# Patient Record
Sex: MALE | Race: WHITE | NOT HISPANIC OR LATINO | Employment: OTHER | URBAN - METROPOLITAN AREA
[De-identification: names, ages, dates, MRNs, and addresses within clinical notes are randomized per-mention and may not be internally consistent; named-entity substitution may affect disease eponyms.]

---

## 2017-04-12 ENCOUNTER — HOSPITAL ENCOUNTER (OUTPATIENT)
Dept: OTHER | Facility: HOSPITAL | Age: 77
Discharge: HOME | End: 2017-04-12
Attending: UROLOGY | Admitting: UROLOGY

## 2018-05-09 ENCOUNTER — ON CAMPUS - OUTPATIENT (AMBULATORY)
Dept: RURAL HOSPITAL 3 | Facility: HOSPITAL | Age: 78
End: 2018-05-09

## 2018-05-09 DIAGNOSIS — K22.4 DYSKINESIA OF ESOPHAGUS: ICD-10-CM

## 2018-05-09 DIAGNOSIS — R13.10 DYSPHAGIA, UNSPECIFIED: ICD-10-CM

## 2018-05-09 DIAGNOSIS — K22.5 DIVERTICULUM OF ESOPHAGUS, ACQUIRED: ICD-10-CM

## 2018-05-09 PROCEDURE — 43249 ESOPH EGD DILATION <30 MM: CPT | Performed by: INTERNAL MEDICINE

## 2020-01-01 ENCOUNTER — OFFICE (AMBULATORY)
Dept: URBAN - METROPOLITAN AREA CLINIC 64 | Facility: CLINIC | Age: 80
End: 2020-01-01

## 2020-01-01 VITALS — HEART RATE: 87 BPM | DIASTOLIC BLOOD PRESSURE: 54 MMHG | SYSTOLIC BLOOD PRESSURE: 96 MMHG

## 2020-01-01 DIAGNOSIS — R13.10 DYSPHAGIA, UNSPECIFIED: ICD-10-CM

## 2020-01-01 PROCEDURE — 99213 OFFICE O/P EST LOW 20 MIN: CPT | Performed by: NURSE PRACTITIONER

## 2021-01-01 ENCOUNTER — INPATIENT HOSPITAL (AMBULATORY)
Dept: URBAN - METROPOLITAN AREA HOSPITAL 84 | Facility: HOSPITAL | Age: 81
End: 2021-01-01

## 2021-01-01 DIAGNOSIS — K22.5 DIVERTICULUM OF ESOPHAGUS, ACQUIRED: ICD-10-CM

## 2021-01-01 DIAGNOSIS — R13.10 DYSPHAGIA, UNSPECIFIED: ICD-10-CM

## 2021-01-01 PROCEDURE — 43249 ESOPH EGD DILATION <30 MM: CPT | Performed by: INTERNAL MEDICINE

## 2021-03-16 ENCOUNTER — APPOINTMENT (OUTPATIENT)
Dept: WOUND CARE | Facility: HOSPITAL | Age: 81
End: 2021-03-16

## 2021-03-18 ENCOUNTER — OFFICE VISIT (OUTPATIENT)
Dept: WOUND CARE | Facility: HOSPITAL | Age: 81
End: 2021-03-18

## 2021-03-18 PROCEDURE — G0463 HOSPITAL OUTPT CLINIC VISIT: HCPCS

## 2021-03-25 ENCOUNTER — APPOINTMENT (OUTPATIENT)
Dept: WOUND CARE | Facility: HOSPITAL | Age: 81
End: 2021-03-25

## 2021-04-01 ENCOUNTER — HOSPITAL ENCOUNTER (INPATIENT)
Facility: HOSPITAL | Age: 81
LOS: 4 days | Discharge: HOME HEALTH | End: 2021-04-05
Attending: FAMILY MEDICINE | Admitting: FAMILY MEDICINE

## 2021-04-01 ENCOUNTER — ANESTHESIA EVENT (OUTPATIENT)
Dept: PERIOP | Facility: HOSPITAL | Age: 81
End: 2021-04-01

## 2021-04-01 ENCOUNTER — LAB REQUISITION (OUTPATIENT)
Dept: LAB | Facility: HOSPITAL | Age: 81
End: 2021-04-01

## 2021-04-01 ENCOUNTER — OFFICE VISIT (OUTPATIENT)
Dept: WOUND CARE | Facility: HOSPITAL | Age: 81
End: 2021-04-01

## 2021-04-01 ENCOUNTER — APPOINTMENT (OUTPATIENT)
Dept: GENERAL RADIOLOGY | Facility: HOSPITAL | Age: 81
End: 2021-04-01

## 2021-04-01 DIAGNOSIS — R13.10 DYSPHAGIA, UNSPECIFIED TYPE: ICD-10-CM

## 2021-04-01 DIAGNOSIS — L89.150 PRESSURE ULCER OF SACRAL REGION, UNSTAGEABLE (HCC): ICD-10-CM

## 2021-04-01 DIAGNOSIS — L89.150 PRESSURE INJURY OF SACRAL REGION, UNSTAGEABLE (HCC): Primary | ICD-10-CM

## 2021-04-01 PROBLEM — I10 BENIGN ESSENTIAL HYPERTENSION: Chronic | Status: ACTIVE | Noted: 2021-04-01

## 2021-04-01 PROBLEM — L89.159 SACRAL PRESSURE ULCER: Status: ACTIVE | Noted: 2021-04-01

## 2021-04-01 PROBLEM — K21.9 GERD WITHOUT ESOPHAGITIS: Chronic | Status: ACTIVE | Noted: 2021-04-01

## 2021-04-01 PROBLEM — R15.9 BOWEL INCONTINENCE: Chronic | Status: ACTIVE | Noted: 2021-04-01

## 2021-04-01 PROBLEM — G35 MULTIPLE SCLEROSIS (HCC): Chronic | Status: ACTIVE | Noted: 2021-04-01

## 2021-04-01 PROBLEM — G35 MULTIPLE SCLEROSIS (HCC): Status: ACTIVE | Noted: 2021-04-01

## 2021-04-01 PROBLEM — E78.5 HYPERLIPIDEMIA: Status: ACTIVE | Noted: 2021-04-01

## 2021-04-01 PROBLEM — I10 BENIGN ESSENTIAL HYPERTENSION: Status: ACTIVE | Noted: 2021-04-01

## 2021-04-01 PROBLEM — I63.9 CEREBROVASCULAR ACCIDENT (CVA) (HCC): Chronic | Status: ACTIVE | Noted: 2021-04-01

## 2021-04-01 PROBLEM — E11.9 TYPE 2 DIABETES MELLITUS (HCC): Status: ACTIVE | Noted: 2021-04-01

## 2021-04-01 PROBLEM — E78.5 HYPERLIPIDEMIA: Chronic | Status: ACTIVE | Noted: 2021-04-01

## 2021-04-01 PROBLEM — E44.1 MILD MALNUTRITION (HCC): Chronic | Status: ACTIVE | Noted: 2021-04-01

## 2021-04-01 PROBLEM — N31.9 NEUROGENIC BLADDER: Chronic | Status: ACTIVE | Noted: 2021-04-01

## 2021-04-01 PROBLEM — Z86.73 HISTORY OF CVA (CEREBROVASCULAR ACCIDENT): Chronic | Status: ACTIVE | Noted: 2021-04-01

## 2021-04-01 PROBLEM — E11.9 TYPE 2 DIABETES MELLITUS (HCC): Chronic | Status: ACTIVE | Noted: 2021-04-01

## 2021-04-01 PROBLEM — I63.9 CEREBROVASCULAR ACCIDENT (CVA) (HCC): Status: ACTIVE | Noted: 2021-04-01

## 2021-04-01 LAB
ALBUMIN SERPL-MCNC: 3.6 G/DL (ref 3.5–5.2)
ALBUMIN/GLOB SERPL: 0.7 G/DL
ALP SERPL-CCNC: 69 U/L (ref 39–117)
ALT SERPL W P-5'-P-CCNC: 30 U/L (ref 1–41)
ANION GAP SERPL CALCULATED.3IONS-SCNC: 12 MMOL/L (ref 5–15)
AST SERPL-CCNC: 21 U/L (ref 1–40)
BASOPHILS # BLD AUTO: 0.1 10*3/MM3 (ref 0–0.2)
BASOPHILS NFR BLD AUTO: 0.8 % (ref 0–1.5)
BILIRUB SERPL-MCNC: 0.3 MG/DL (ref 0–1.2)
BUN SERPL-MCNC: 26 MG/DL (ref 8–23)
BUN/CREAT SERPL: 27.1 (ref 7–25)
CALCIUM SPEC-SCNC: 9 MG/DL (ref 8.6–10.5)
CHLORIDE SERPL-SCNC: 104 MMOL/L (ref 98–107)
CO2 SERPL-SCNC: 26 MMOL/L (ref 22–29)
CREAT SERPL-MCNC: 0.96 MG/DL (ref 0.76–1.27)
CRP SERPL-MCNC: 19.39 MG/DL (ref 0–0.5)
D-LACTATE SERPL-SCNC: 1.6 MMOL/L (ref 0.5–2)
DEPRECATED RDW RBC AUTO: 43.3 FL (ref 37–54)
EOSINOPHIL # BLD AUTO: 0.1 10*3/MM3 (ref 0–0.4)
EOSINOPHIL NFR BLD AUTO: 0.4 % (ref 0.3–6.2)
ERYTHROCYTE [DISTWIDTH] IN BLOOD BY AUTOMATED COUNT: 16.2 % (ref 12.3–15.4)
ERYTHROCYTE [SEDIMENTATION RATE] IN BLOOD: >130 MM/HR (ref 0–20)
GFR SERPL CREATININE-BSD FRML MDRD: 76 ML/MIN/1.73
GLOBULIN UR ELPH-MCNC: 5.1 GM/DL
GLUCOSE SERPL-MCNC: 145 MG/DL (ref 65–99)
HCT VFR BLD AUTO: 43.9 % (ref 37.5–51)
HGB BLD-MCNC: 13.9 G/DL (ref 13–17.7)
LYMPHOCYTES # BLD AUTO: 1 10*3/MM3 (ref 0.7–3.1)
LYMPHOCYTES NFR BLD AUTO: 8.1 % (ref 19.6–45.3)
MCH RBC QN AUTO: 24.6 PG (ref 26.6–33)
MCHC RBC AUTO-ENTMCNC: 31.7 G/DL (ref 31.5–35.7)
MCV RBC AUTO: 77.7 FL (ref 79–97)
MONOCYTES # BLD AUTO: 1.1 10*3/MM3 (ref 0.1–0.9)
MONOCYTES NFR BLD AUTO: 9.1 % (ref 5–12)
NEUTROPHILS NFR BLD AUTO: 10.3 10*3/MM3 (ref 1.7–7)
NEUTROPHILS NFR BLD AUTO: 81.6 % (ref 42.7–76)
NRBC BLD AUTO-RTO: 0.1 /100 WBC (ref 0–0.2)
PLATELET # BLD AUTO: 508 10*3/MM3 (ref 140–450)
PMV BLD AUTO: 7.3 FL (ref 6–12)
POTASSIUM SERPL-SCNC: 4.4 MMOL/L (ref 3.5–5.2)
PROT SERPL-MCNC: 8.7 G/DL (ref 6–8.5)
RBC # BLD AUTO: 5.65 10*6/MM3 (ref 4.14–5.8)
SARS-COV-2 RNA PNL SPEC NAA+PROBE: NOT DETECTED
SODIUM SERPL-SCNC: 142 MMOL/L (ref 136–145)
WBC # BLD AUTO: 12.6 10*3/MM3 (ref 3.4–10.8)

## 2021-04-01 PROCEDURE — 71045 X-RAY EXAM CHEST 1 VIEW: CPT

## 2021-04-01 PROCEDURE — 87070 CULTURE OTHR SPECIMN AEROBIC: CPT | Performed by: SURGERY

## 2021-04-01 PROCEDURE — 25010000002 CEFEPIME PER 500 MG: Performed by: NURSE PRACTITIONER

## 2021-04-01 PROCEDURE — 87150 DNA/RNA AMPLIFIED PROBE: CPT | Performed by: NURSE PRACTITIONER

## 2021-04-01 PROCEDURE — 85652 RBC SED RATE AUTOMATED: CPT | Performed by: NURSE PRACTITIONER

## 2021-04-01 PROCEDURE — G0463 HOSPITAL OUTPT CLINIC VISIT: HCPCS

## 2021-04-01 PROCEDURE — U0003 INFECTIOUS AGENT DETECTION BY NUCLEIC ACID (DNA OR RNA); SEVERE ACUTE RESPIRATORY SYNDROME CORONAVIRUS 2 (SARS-COV-2) (CORONAVIRUS DISEASE [COVID-19]), AMPLIFIED PROBE TECHNIQUE, MAKING USE OF HIGH THROUGHPUT TECHNOLOGIES AS DESCRIBED BY CMS-2020-01-R: HCPCS | Performed by: SURGERY

## 2021-04-01 PROCEDURE — 80053 COMPREHEN METABOLIC PANEL: CPT | Performed by: NURSE PRACTITIONER

## 2021-04-01 PROCEDURE — 87147 CULTURE TYPE IMMUNOLOGIC: CPT | Performed by: NURSE PRACTITIONER

## 2021-04-01 PROCEDURE — 87147 CULTURE TYPE IMMUNOLOGIC: CPT | Performed by: SURGERY

## 2021-04-01 PROCEDURE — 83605 ASSAY OF LACTIC ACID: CPT | Performed by: NURSE PRACTITIONER

## 2021-04-01 PROCEDURE — 87641 MR-STAPH DNA AMP PROBE: CPT | Performed by: SURGERY

## 2021-04-01 PROCEDURE — 25010000002 VANCOMYCIN 10 G RECONSTITUTED SOLUTION: Performed by: NURSE PRACTITIONER

## 2021-04-01 PROCEDURE — 87040 BLOOD CULTURE FOR BACTERIA: CPT | Performed by: NURSE PRACTITIONER

## 2021-04-01 PROCEDURE — 86140 C-REACTIVE PROTEIN: CPT | Performed by: NURSE PRACTITIONER

## 2021-04-01 PROCEDURE — 87186 SC STD MICRODIL/AGAR DIL: CPT | Performed by: SURGERY

## 2021-04-01 PROCEDURE — 87077 CULTURE AEROBIC IDENTIFY: CPT | Performed by: SURGERY

## 2021-04-01 PROCEDURE — 25010000002 ENOXAPARIN PER 10 MG: Performed by: NURSE PRACTITIONER

## 2021-04-01 PROCEDURE — U0005 INFEC AGEN DETEC AMPLI PROBE: HCPCS | Performed by: SURGERY

## 2021-04-01 PROCEDURE — 85025 COMPLETE CBC W/AUTO DIFF WBC: CPT | Performed by: NURSE PRACTITIONER

## 2021-04-01 PROCEDURE — 99222 1ST HOSP IP/OBS MODERATE 55: CPT | Performed by: SURGERY

## 2021-04-01 PROCEDURE — 87205 SMEAR GRAM STAIN: CPT | Performed by: SURGERY

## 2021-04-01 RX ORDER — ONDANSETRON 2 MG/ML
4 INJECTION INTRAMUSCULAR; INTRAVENOUS EVERY 6 HOURS PRN
Status: DISCONTINUED | OUTPATIENT
Start: 2021-04-01 | End: 2021-04-05 | Stop reason: HOSPADM

## 2021-04-01 RX ORDER — ACETAMINOPHEN 650 MG/1
650 SUPPOSITORY RECTAL EVERY 4 HOURS PRN
Status: DISCONTINUED | OUTPATIENT
Start: 2021-04-01 | End: 2021-04-05 | Stop reason: HOSPADM

## 2021-04-01 RX ORDER — AMLODIPINE BESYLATE 10 MG/1
1 TABLET ORAL EVERY EVENING
COMMUNITY
Start: 2021-02-27

## 2021-04-01 RX ORDER — ONDANSETRON 4 MG/1
4 TABLET, FILM COATED ORAL EVERY 6 HOURS PRN
Status: DISCONTINUED | OUTPATIENT
Start: 2021-04-01 | End: 2021-04-05 | Stop reason: HOSPADM

## 2021-04-01 RX ORDER — AMLODIPINE BESYLATE 5 MG/1
10 TABLET ORAL DAILY
Status: DISCONTINUED | OUTPATIENT
Start: 2021-04-01 | End: 2021-04-05 | Stop reason: HOSPADM

## 2021-04-01 RX ORDER — HYDRALAZINE HYDROCHLORIDE 20 MG/ML
10 INJECTION INTRAMUSCULAR; INTRAVENOUS EVERY 6 HOURS PRN
Status: DISCONTINUED | OUTPATIENT
Start: 2021-04-01 | End: 2021-04-05 | Stop reason: HOSPADM

## 2021-04-01 RX ORDER — SODIUM CHLORIDE 0.9 % (FLUSH) 0.9 %
10 SYRINGE (ML) INJECTION EVERY 12 HOURS SCHEDULED
Status: DISCONTINUED | OUTPATIENT
Start: 2021-04-01 | End: 2021-04-05 | Stop reason: HOSPADM

## 2021-04-01 RX ORDER — ACETAMINOPHEN 325 MG/1
650 TABLET ORAL EVERY 4 HOURS PRN
Status: DISCONTINUED | OUTPATIENT
Start: 2021-04-01 | End: 2021-04-05 | Stop reason: HOSPADM

## 2021-04-01 RX ORDER — MAGNESIUM SULFATE HEPTAHYDRATE 40 MG/ML
2 INJECTION, SOLUTION INTRAVENOUS AS NEEDED
Status: DISCONTINUED | OUTPATIENT
Start: 2021-04-01 | End: 2021-04-05 | Stop reason: HOSPADM

## 2021-04-01 RX ORDER — PANTOPRAZOLE SODIUM 40 MG/1
40 TABLET, DELAYED RELEASE ORAL
Status: DISCONTINUED | OUTPATIENT
Start: 2021-04-02 | End: 2021-04-05 | Stop reason: HOSPADM

## 2021-04-01 RX ORDER — POTASSIUM CHLORIDE 20 MEQ/1
40 TABLET, EXTENDED RELEASE ORAL AS NEEDED
Status: DISCONTINUED | OUTPATIENT
Start: 2021-04-01 | End: 2021-04-05 | Stop reason: HOSPADM

## 2021-04-01 RX ORDER — OMEPRAZOLE 20 MG/1
1 CAPSULE, DELAYED RELEASE ORAL DAILY
COMMUNITY
Start: 2021-01-19

## 2021-04-01 RX ORDER — ACETAMINOPHEN 160 MG/5ML
650 SOLUTION ORAL EVERY 4 HOURS PRN
Status: DISCONTINUED | OUTPATIENT
Start: 2021-04-01 | End: 2021-04-05 | Stop reason: HOSPADM

## 2021-04-01 RX ORDER — ASPIRIN AND DIPYRIDAMOLE 25; 200 MG/1; MG/1
1 CAPSULE, EXTENDED RELEASE ORAL EVERY 12 HOURS
COMMUNITY
Start: 2021-01-20

## 2021-04-01 RX ORDER — GABAPENTIN 300 MG/1
600 CAPSULE ORAL EVERY 12 HOURS SCHEDULED
Status: DISCONTINUED | OUTPATIENT
Start: 2021-04-01 | End: 2021-04-05 | Stop reason: HOSPADM

## 2021-04-01 RX ORDER — SODIUM CHLORIDE 0.9 % (FLUSH) 0.9 %
10 SYRINGE (ML) INJECTION AS NEEDED
Status: DISCONTINUED | OUTPATIENT
Start: 2021-04-01 | End: 2021-04-05 | Stop reason: HOSPADM

## 2021-04-01 RX ORDER — MAGNESIUM SULFATE 1 G/100ML
1 INJECTION INTRAVENOUS AS NEEDED
Status: DISCONTINUED | OUTPATIENT
Start: 2021-04-01 | End: 2021-04-05 | Stop reason: HOSPADM

## 2021-04-01 RX ORDER — GABAPENTIN 600 MG/1
1 TABLET ORAL 2 TIMES DAILY
COMMUNITY
Start: 2021-01-23

## 2021-04-01 RX ADMIN — VANCOMYCIN HYDROCHLORIDE 1500 MG: 10 INJECTION, POWDER, LYOPHILIZED, FOR SOLUTION INTRAVENOUS at 20:21

## 2021-04-01 RX ADMIN — CEFEPIME HYDROCHLORIDE 2 G: 2 INJECTION, POWDER, FOR SOLUTION INTRAVENOUS at 21:21

## 2021-04-01 RX ADMIN — ENOXAPARIN SODIUM 40 MG: 40 INJECTION SUBCUTANEOUS at 21:21

## 2021-04-01 RX ADMIN — AMLODIPINE BESYLATE 10 MG: 5 TABLET ORAL at 19:13

## 2021-04-01 RX ADMIN — Medication 10 ML: at 21:22

## 2021-04-01 RX ADMIN — SERTRALINE 50 MG: 50 TABLET, FILM COATED ORAL at 21:21

## 2021-04-01 RX ADMIN — GABAPENTIN 600 MG: 300 CAPSULE ORAL at 21:22

## 2021-04-01 NOTE — PROGRESS NOTES
"Pharmacy Antimicrobial Dosing Service    Subjective:  Vicente Singh is a 80 y.o.male admitted with sacral pressure ulcer. Pharmacy has been consulted to dose Vancomycin for possible SSTI.    PMH: MS, DM      Assessment/Plan    1. Day #1 Vancomycin: Goal -600 mcg*h/mL.   Will give load dose of 1500 mg x1 (~25 mg/kg) followed by maintenance dosing of 1250 mg Q24h (~21 mg/kg). Obtain trough 4/4 18:00, at steady state prior to 4th total dose (or sooner if clinically warranted). Obtain random level 4/3 23:00, for pharmacist to calculate patient-specific pharmacokinetic parameters and AUC.    2. Day #1 Cefepime: 2gm IV q8h for borderline estCrCl ~60 ml/min.    Will continue to monitor drug levels, renal function, culture and sensitivities, and patient clinical status.       Objective:  Relevant clinical data and objective history reviewed:  180.3 cm (71\")   60.8 kg (134 lb)   Ideal body weight: 75.3 kg (166 lb 0.1 oz)  Body mass index is 18.69 kg/m².        Results from last 7 days   Lab Units 04/01/21  1725   CREATININE mg/dL 0.96     Estimated Creatinine Clearance: 54.5 mL/min (by C-G formula based on SCr of 0.96 mg/dL).  No intake/output data recorded.    Results from last 7 days   Lab Units 04/01/21  1725   WBC 10*3/mm3 12.60*     Temperature    04/01/21 1645   Temp: 97.3 °F (36.3 °C)     Baseline culture/source/susceptibility:  Microbiology Results (last 10 days)       ** No results found for the last 240 hours. **              Anti-Infectives (From admission, onward)      Ordered     Dose/Rate Route Frequency Start Stop    04/01/21 1847  vancomycin 1250 mg/250 mL 0.9% NS IVPB ([facility])     Ordering Provider: Nelly Tovar APRN    1,250 mg Intravenous Every 24 Hours 04/02/21 1900 04/15/21 1859 04/01/21 1811  cefepime 2 gm IVPB in 100 ml NS ([facility])     Ordering Provider: Nelly Tovar APRN    2 g  over 30 Minutes Intravenous Every 8 Hours 04/01/21 2100 04/15/21 2059    04/01/21 1824  vancomycin " 1500 mg/500 mL 0.9% NS IVPB ([facility])     Ordering Provider: Nelly Tovar APRN    1,500 mg Intravenous Once 04/01/21 1900      04/01/21 1811  Pharmacy to dose vancomycin     Ordering Provider: Nelly Tovar APRN     Does not apply Continuous PRN 04/01/21 1810 04/15/21 1809            Ashely Hazel PharmD, BCPS  04/01/21 18:48 EDT

## 2021-04-01 NOTE — H&P
[facility] Medicine Services      Patient Name: Vicente Singh  : 1940  MRN: [id number]  Primary Care Physician: Leland Santo MD  Date of admission: 2021    Patient Care Team:  Leland Santo MD as PCP - Jessenia Duque RN as Registered Nurse          Subjective   History Present Illness     Chief Complaint: necrotic sacral wound    Mr. Singh is a 80 y.o. male with a past medical history of hypertension, CVA, hyperlipidemia, MS, and type 2 diabetes mellitus who was directly admitted to [facility] ED by Dr. Graham from [facility] wound care center on 2021 due to an necrotic sacral wound.  Hospital accepted the patient for further care management.  Per patient's wife this all started in 2020 when she noticed a pressure ulcer on his sacrum.  She had home health coming out and eventually they decided he would need to go to the wound care center because it started to drain yellow discharge and have a foul odor.  Patient started to go to the wound care center 1 month ago and was supposed to follow-up last Thursday, however had a fever so he did not follow-up at that time.  Patient's wife was instructed to bring patient to the ED if his fever got above 101.0.  She ended up bringing him to the wound care center today for follow-up.  She reports he has had multiple pressure ulcers in the past that were just red spots, but would eventually heal.  She explains he has a neurogenic bladder and has a chronic Matos catheter.  She also explains that he has bowel incontinence and she feeds him his meals due to current contracted arms.  Patient was able to deny he is in pain.  Patient is currently alert and oriented x3. ID and general surgery were consulted upon admission. Patient was started on empiric antibiotics and wound culture was collected at the Wound care center.          Review of Systems   Constitutional: Negative.   HENT:  Negative.    Cardiovascular: Negative.    Respiratory: Negative.    Skin:        Wound on sacrum and left hip    Musculoskeletal:        Arm contracted    Gastrointestinal: Positive for bowel incontinence.   Genitourinary:        Neurogenic bladder, lau catheter    Neurological:        Neurogenic bladder   Psychiatric/Behavioral: Negative.            Personal History     Past Medical History:   Past Medical History:   Diagnosis Date   • Benign essential hypertension 4/1/2021   • Cerebrovascular accident (CVA) (CMS/[facility]) 4/1/2021   • Hyperlipidemia 4/1/2021   • Multiple sclerosis (CMS/[facility]) 4/1/2021   • Type 2 diabetes mellitus (CMS/[facility]) 4/1/2021       Surgical History:    History reviewed. No pertinent surgical history.        Family History: family history includes No Known Problems in his father and mother. Otherwise pertinent FHx was reviewed and unremarkable.     Social History:  reports that he has never smoked. He has never used smokeless tobacco. He reports previous alcohol use. He reports that he does not use drugs.      Medications:  Prior to Admission medications    Not on File       Allergies:  No Known Allergies    Objective   Objective     Vital Signs  Temp:  [97.3 °F (36.3 °C)] 97.3 °F (36.3 °C)  Heart Rate:  [106] 106  Resp:  [20] 20  BP: (177)/(102) 177/102  SpO2:  [96 %] 96 %  on   ;   Device (Oxygen Therapy): room air  Body mass index is 18.69 kg/m².    Physical Exam  Vitals reviewed.   Constitutional:       Comments: Frail    HENT:      Head: Normocephalic and atraumatic.      Nose: Nose normal.      Mouth/Throat:      Mouth: Mucous membranes are moist.      Pharynx: Oropharynx is clear.   Eyes:      Extraocular Movements: Extraocular movements intact.      Conjunctiva/sclera: Conjunctivae normal.      Pupils: Pupils are equal, round, and reactive to light.   Cardiovascular:      Rate and Rhythm: Normal rate and regular rhythm.      Pulses: Normal pulses.      Heart sounds: Normal heart sounds.       Comments: S1, S2 audible  Pulmonary:      Effort: Pulmonary effort is normal.      Breath sounds: Normal breath sounds.      Comments: On room air   Abdominal:      General: Abdomen is flat. Bowel sounds are normal.      Palpations: Abdomen is soft.   Musculoskeletal:      Comments: Contracted arms, patient immobile, wheelchair bound   Skin:     Comments: Wound on sacrum, foul smelling, yellow drainage noted, open wound    Wound on left hip, not open bruised and erythematous    Neurological:      Mental Status: He is alert and oriented to person, place, and time.   Psychiatric:         Mood and Affect: Mood normal.           Results Review:  I have personally reviewed most recent cardiac tracings, lab results and radiology images and interpretations and agree with findings.    Results from last 7 days   Lab Units 04/01/21  1725   WBC 10*3/mm3 12.60*   HEMOGLOBIN g/dL 13.9   HEMATOCRIT % 43.9   PLATELETS 10*3/mm3 508*           Invalid input(s):  ALKPHOS  CrCl cannot be calculated (No successful lab value found.).  Brief Urine Lab Results     None          Microbiology Results (last 10 days)     ** No results found for the last 240 hours. **          ECG/EMG Results (most recent)     None                  No radiology results for the last 7 days      CrCl cannot be calculated (No successful lab value found.).    Assessment/Plan   Assessment/Plan       Active Hospital Problems    Diagnosis  POA   • **Sacral pressure ulcer [L89.159]  Yes     Priority: High   • Benign essential hypertension [I10]  Yes   • Hyperlipidemia [E78.5]  Yes   • Multiple sclerosis (CMS/HCC) [G35]  Yes   • Type 2 diabetes mellitus (CMS/HCC) [E11.9]  Yes   • GERD without esophagitis [K21.9]  Yes   • History of CVA (cerebrovascular accident) [Z86.73]  Not Applicable   • Neurogenic bladder [N31.9]  Yes   • Mild malnutrition (CMS/HCC) [E44.1]  Yes   • Bowel incontinence [R15.9]  Yes      Resolved Hospital Problems   No resolved problems to display.      Acute sacral pressure ulcer  - Noted to be necrotic on exam with foul smelling odor and yellow drainage- likely infection   - Turn patient every 2 hours  - Specialty bed ordered   - Check CBC, CMP, ESR, CRP, lactic acid, Wound culture, and blood cultures  - Zosyn ordered  -General surgery and ID consulted     Essential HTN  - Uncontrolled  - Monitor blood pressure  - Continue amlodipine   - IV hydralazine ordered PRN     HLD  - No reported home medication, in medical record  - Check lipid panel     Type II DM  - Start sliding scale, Accuchecks  - Check hbg A1C  - No reported home medication    GERD  - Continue PPI    Depression  - Stable  - Continue zoloft     History of CVA  - On aggrenox at home     MS   - Patient is wheelchair bound and arms are contracted   - Continue gabapentin     Neurogenic bladder, bowel incontinence   - Secondary to MS  - Chronic lau catheter     Malnutrition  - BMI 18.6  - Nutrition consulted     VTE Prophylaxis - Subcutaneous lovenox     CODE STATUS:    Code Status and Medical Interventions:   Ordered at: 04/01/21 5029     Limited Support to NOT Include:    Intubation     Level Of Support Discussed With:    Health Care Surrogate     Code Status:    No CPR     Medical Interventions (Level of Support Prior to Arrest):    Limited       This patient has been examined wearing appropriate Personal Protective Equipment. 04/01/21      I discussed the patient's findings and my recommendations with patient, family and nursing staff.      Signature: Electronically signed by JACINDA Curtis, 04/01/21, 5:38 PM EDT.    [facility] Hospitalist Team    Attending attestation:    I performed a history and physical examination of the patient. I reviewed the nurse practitioner's note and agree with the documented findings and plan of care.    Attending attestation:     I performed a history and physical examination of the patient. I reviewed the nurse practitioner's note and agree with the  documented findings and plan of care.     S:     Patient is a 80-year-old male with history of multiple sclerosis presenting as direct admission from wound care center for concerns over worsening sacral necrotic wound.  Patient's wife reports patient has had a week of fevers with T-max of 102.  Patient admitted for further work-up of infected wound.     O:     Vital signs reviewed     General: Frail elderly male sitting up in wheelchair breathing comfortably on room air no acute distress  HEENT: NC/AT, EOMI, mucosa moist  Heart: Regular, rate controlled  Chest: Normal work of breathing, moving air well no wheezing  Abdominal: Soft. NT/ND.   Musculoskeletal: Diminished range of motion, arm contractures noted bilaterally.  No edema. No calf tenderness.  Neurological: Awake and alert, spasticity noted  Skin: Skin is warm and dry.  Wound covered currently  Psychiatric: Normal mood and affect.     A/P     Infected pressure ulcer-patient appearing debilitated with significant sequelae from multiple sclerosis likely to have poor healing  -Wound care  -IV antibiotics with Zosyn  -ID consult  -Likely MRI  -General surgery consulted  -Inflammatory markers  -Check A1c  -Blood cultures  -Culture wound  -Daily CBC     Multiple sclerosis-with significant sequelae including multiple contractures chronic debility and signs of wasting  -Continue home medications  -PT/OT    Electronically signed by Fei Bruner MD, 04/02/21, 12:08 PM EDT.

## 2021-04-01 NOTE — PROGRESS NOTES
Attending attestation:    I performed a history and physical examination of the patient. I reviewed the nurse practitioner's note and agree with the documented findings and plan of care.    S:    Patient is a 80-year-old male with history of multiple sclerosis presenting as direct admission from wound care center for concerns over worsening sacral necrotic wound.  Patient's wife reports patient has had a week of fevers with T-max of 102.  Patient admitted for further work-up of infected wound.    O:    Vital signs reviewed    General: Frail elderly male sitting up in wheelchair breathing comfortably on room air no acute distress  HEENT: NC/AT, EOMI, mucosa moist  Heart: Regular, rate controlled  Chest: Normal work of breathing, moving air well no wheezing  Abdominal: Soft. NT/ND.   Musculoskeletal: Diminished range of motion, arm contractures noted bilaterally.  No edema. No calf tenderness.  Neurological: Awake and alert, spasticity noted  Skin: Skin is warm and dry.  Wound covered currently  Psychiatric: Normal mood and affect.    A/P    Infected pressure ulcer-patient appearing debilitated with significant sequelae from multiple sclerosis likely to have poor healing  -Wound care  -IV antibiotics with Zosyn  -ID consult  -Likely MRI  -Check A1c  -Blood cultures  -Culture wound  -Daily CBC    Multiple sclerosis-with significant sequelae including multiple contractures chronic debility and signs of wasting  -Continue home medications  -PT/OT    Electronically signed by Fei Bruner MD, 04/02/21, 12:08 PM EDT.

## 2021-04-02 ENCOUNTER — APPOINTMENT (OUTPATIENT)
Dept: MRI IMAGING | Facility: HOSPITAL | Age: 81
End: 2021-04-02

## 2021-04-02 ENCOUNTER — ANESTHESIA (OUTPATIENT)
Dept: GASTROENTEROLOGY | Facility: HOSPITAL | Age: 81
End: 2021-04-02

## 2021-04-02 ENCOUNTER — ANESTHESIA (OUTPATIENT)
Dept: PERIOP | Facility: HOSPITAL | Age: 81
End: 2021-04-02

## 2021-04-02 ENCOUNTER — ANESTHESIA EVENT (OUTPATIENT)
Dept: GASTROENTEROLOGY | Facility: HOSPITAL | Age: 81
End: 2021-04-02

## 2021-04-02 PROBLEM — R13.10 DYSPHAGIA: Status: ACTIVE | Noted: 2021-04-01

## 2021-04-02 LAB
ALBUMIN SERPL-MCNC: 2.9 G/DL (ref 3.5–5.2)
ALBUMIN/GLOB SERPL: 0.6 G/DL
ALP SERPL-CCNC: 59 U/L (ref 39–117)
ALT SERPL W P-5'-P-CCNC: 24 U/L (ref 1–41)
ANION GAP SERPL CALCULATED.3IONS-SCNC: 12 MMOL/L (ref 5–15)
AST SERPL-CCNC: 20 U/L (ref 1–40)
BACTERIA BLD CULT: ABNORMAL
BASOPHILS # BLD AUTO: 0.1 10*3/MM3 (ref 0–0.2)
BASOPHILS NFR BLD AUTO: 0.9 % (ref 0–1.5)
BILIRUB SERPL-MCNC: 0.2 MG/DL (ref 0–1.2)
BOTTLE TYPE: ABNORMAL
BUN SERPL-MCNC: 23 MG/DL (ref 8–23)
BUN/CREAT SERPL: 29.1 (ref 7–25)
CALCIUM SPEC-SCNC: 8.4 MG/DL (ref 8.6–10.5)
CHLORIDE SERPL-SCNC: 102 MMOL/L (ref 98–107)
CO2 SERPL-SCNC: 24 MMOL/L (ref 22–29)
CREAT SERPL-MCNC: 0.79 MG/DL (ref 0.76–1.27)
DEPRECATED RDW RBC AUTO: 43.8 FL (ref 37–54)
EOSINOPHIL # BLD AUTO: 0.1 10*3/MM3 (ref 0–0.4)
EOSINOPHIL NFR BLD AUTO: 0.8 % (ref 0.3–6.2)
ERYTHROCYTE [DISTWIDTH] IN BLOOD BY AUTOMATED COUNT: 16.2 % (ref 12.3–15.4)
GFR SERPL CREATININE-BSD FRML MDRD: 95 ML/MIN/1.73
GLOBULIN UR ELPH-MCNC: 4.5 GM/DL
GLUCOSE BLDC GLUCOMTR-MCNC: 121 MG/DL (ref 70–105)
GLUCOSE BLDC GLUCOMTR-MCNC: 124 MG/DL (ref 70–105)
GLUCOSE SERPL-MCNC: 99 MG/DL (ref 65–99)
HCT VFR BLD AUTO: 36.3 % (ref 37.5–51)
HGB BLD-MCNC: 11.6 G/DL (ref 13–17.7)
LYMPHOCYTES # BLD AUTO: 1.6 10*3/MM3 (ref 0.7–3.1)
LYMPHOCYTES NFR BLD AUTO: 13.3 % (ref 19.6–45.3)
MAGNESIUM SERPL-MCNC: 1.9 MG/DL (ref 1.6–2.4)
MCH RBC QN AUTO: 24.7 PG (ref 26.6–33)
MCHC RBC AUTO-ENTMCNC: 32.1 G/DL (ref 31.5–35.7)
MCV RBC AUTO: 76.9 FL (ref 79–97)
MONOCYTES # BLD AUTO: 1.3 10*3/MM3 (ref 0.1–0.9)
MONOCYTES NFR BLD AUTO: 10.9 % (ref 5–12)
MRSA DNA SPEC QL NAA+PROBE: NORMAL
NEUTROPHILS NFR BLD AUTO: 74.1 % (ref 42.7–76)
NEUTROPHILS NFR BLD AUTO: 8.9 10*3/MM3 (ref 1.7–7)
NRBC BLD AUTO-RTO: 0.1 /100 WBC (ref 0–0.2)
PLATELET # BLD AUTO: 497 10*3/MM3 (ref 140–450)
PMV BLD AUTO: 7.6 FL (ref 6–12)
POTASSIUM SERPL-SCNC: 4 MMOL/L (ref 3.5–5.2)
PROT SERPL-MCNC: 7.4 G/DL (ref 6–8.5)
RBC # BLD AUTO: 4.72 10*6/MM3 (ref 4.14–5.8)
SODIUM SERPL-SCNC: 138 MMOL/L (ref 136–145)
WBC # BLD AUTO: 12 10*3/MM3 (ref 3.4–10.8)

## 2021-04-02 PROCEDURE — 99233 SBSQ HOSP IP/OBS HIGH 50: CPT | Performed by: FAMILY MEDICINE

## 2021-04-02 PROCEDURE — 25010000002 VANCOMYCIN 10 G RECONSTITUTED SOLUTION: Performed by: SURGERY

## 2021-04-02 PROCEDURE — 25010000002 PROPOFOL 10 MG/ML EMULSION: Performed by: ANESTHESIOLOGIST ASSISTANT

## 2021-04-02 PROCEDURE — 25010000002 CEFEPIME PER 500 MG: Performed by: SURGERY

## 2021-04-02 PROCEDURE — 72197 MRI PELVIS W/O & W/DYE: CPT

## 2021-04-02 PROCEDURE — 0JB70ZZ EXCISION OF BACK SUBCUTANEOUS TISSUE AND FASCIA, OPEN APPROACH: ICD-10-PCS | Performed by: SURGERY

## 2021-04-02 PROCEDURE — 11042 DBRDMT SUBQ TIS 1ST 20SQCM/<: CPT | Performed by: SURGERY

## 2021-04-02 PROCEDURE — 25010000002 CEFEPIME PER 500 MG: Performed by: NURSE PRACTITIONER

## 2021-04-02 PROCEDURE — A9579 GAD-BASE MR CONTRAST NOS,1ML: HCPCS | Performed by: FAMILY MEDICINE

## 2021-04-02 PROCEDURE — 11045 DBRDMT SUBQ TISS EACH ADDL: CPT | Performed by: SURGERY

## 2021-04-02 PROCEDURE — 25010000002 GADOTERIDOL PER 1 ML: Performed by: FAMILY MEDICINE

## 2021-04-02 PROCEDURE — 80053 COMPREHEN METABOLIC PANEL: CPT | Performed by: NURSE PRACTITIONER

## 2021-04-02 PROCEDURE — 83735 ASSAY OF MAGNESIUM: CPT | Performed by: NURSE PRACTITIONER

## 2021-04-02 PROCEDURE — C1726 CATH, BAL DIL, NON-VASCULAR: HCPCS | Performed by: INTERNAL MEDICINE

## 2021-04-02 PROCEDURE — 25010000002 FENTANYL CITRATE (PF) 100 MCG/2ML SOLUTION: Performed by: ANESTHESIOLOGIST ASSISTANT

## 2021-04-02 PROCEDURE — 25010000002 ONDANSETRON PER 1 MG: Performed by: ANESTHESIOLOGIST ASSISTANT

## 2021-04-02 PROCEDURE — 82962 GLUCOSE BLOOD TEST: CPT

## 2021-04-02 PROCEDURE — 25010000002 DEXAMETHASONE PER 1 MG: Performed by: ANESTHESIOLOGIST ASSISTANT

## 2021-04-02 PROCEDURE — 85025 COMPLETE CBC W/AUTO DIFF WBC: CPT | Performed by: NURSE PRACTITIONER

## 2021-04-02 PROCEDURE — 0D748ZZ DILATION OF ESOPHAGOGASTRIC JUNCTION, VIA NATURAL OR ARTIFICIAL OPENING ENDOSCOPIC: ICD-10-PCS | Performed by: INTERNAL MEDICINE

## 2021-04-02 RX ORDER — PHENYLEPHRINE HCL IN 0.9% NACL 1 MG/10 ML
SYRINGE (ML) INTRAVENOUS AS NEEDED
Status: DISCONTINUED | OUTPATIENT
Start: 2021-04-02 | End: 2021-04-02 | Stop reason: SURG

## 2021-04-02 RX ORDER — EPHEDRINE SULFATE 50 MG/ML
5 INJECTION, SOLUTION INTRAVENOUS ONCE AS NEEDED
Status: DISCONTINUED | OUTPATIENT
Start: 2021-04-02 | End: 2021-04-02 | Stop reason: HOSPADM

## 2021-04-02 RX ORDER — LABETALOL HYDROCHLORIDE 5 MG/ML
5 INJECTION, SOLUTION INTRAVENOUS
Status: DISCONTINUED | OUTPATIENT
Start: 2021-04-02 | End: 2021-04-02 | Stop reason: HOSPADM

## 2021-04-02 RX ORDER — NALOXONE HCL 0.4 MG/ML
0.4 VIAL (ML) INJECTION AS NEEDED
Status: DISCONTINUED | OUTPATIENT
Start: 2021-04-02 | End: 2021-04-02 | Stop reason: HOSPADM

## 2021-04-02 RX ORDER — ACETAMINOPHEN 650 MG
TABLET, EXTENDED RELEASE ORAL AS NEEDED
Status: DISCONTINUED | OUTPATIENT
Start: 2021-04-02 | End: 2021-04-02 | Stop reason: HOSPADM

## 2021-04-02 RX ORDER — LIDOCAINE HYDROCHLORIDE 10 MG/ML
INJECTION, SOLUTION EPIDURAL; INFILTRATION; INTRACAUDAL; PERINEURAL AS NEEDED
Status: DISCONTINUED | OUTPATIENT
Start: 2021-04-02 | End: 2021-04-02 | Stop reason: SURG

## 2021-04-02 RX ORDER — SODIUM CHLORIDE 0.9 % (FLUSH) 0.9 %
3 SYRINGE (ML) INJECTION EVERY 12 HOURS SCHEDULED
Status: DISCONTINUED | OUTPATIENT
Start: 2021-04-02 | End: 2021-04-05 | Stop reason: HOSPADM

## 2021-04-02 RX ORDER — ROCURONIUM BROMIDE 10 MG/ML
INJECTION, SOLUTION INTRAVENOUS AS NEEDED
Status: DISCONTINUED | OUTPATIENT
Start: 2021-04-02 | End: 2021-04-02 | Stop reason: SURG

## 2021-04-02 RX ORDER — SODIUM CHLORIDE 9 MG/ML
INJECTION, SOLUTION INTRAVENOUS CONTINUOUS PRN
Status: DISCONTINUED | OUTPATIENT
Start: 2021-04-02 | End: 2021-04-02 | Stop reason: SURG

## 2021-04-02 RX ORDER — IBUPROFEN 400 MG/1
600 TABLET ORAL ONCE AS NEEDED
Status: DISCONTINUED | OUTPATIENT
Start: 2021-04-02 | End: 2021-04-02 | Stop reason: HOSPADM

## 2021-04-02 RX ORDER — FENTANYL CITRATE 50 UG/ML
50 INJECTION, SOLUTION INTRAMUSCULAR; INTRAVENOUS
Status: DISCONTINUED | OUTPATIENT
Start: 2021-04-02 | End: 2021-04-02 | Stop reason: HOSPADM

## 2021-04-02 RX ORDER — ACETAMINOPHEN 650 MG/1
650 SUPPOSITORY RECTAL ONCE AS NEEDED
Status: DISCONTINUED | OUTPATIENT
Start: 2021-04-02 | End: 2021-04-02 | Stop reason: HOSPADM

## 2021-04-02 RX ORDER — ONDANSETRON 2 MG/ML
4 INJECTION INTRAMUSCULAR; INTRAVENOUS ONCE AS NEEDED
Status: DISCONTINUED | OUTPATIENT
Start: 2021-04-02 | End: 2021-04-02 | Stop reason: HOSPADM

## 2021-04-02 RX ORDER — LIDOCAINE HYDROCHLORIDE 20 MG/ML
INJECTION, SOLUTION EPIDURAL; INFILTRATION; INTRACAUDAL; PERINEURAL AS NEEDED
Status: DISCONTINUED | OUTPATIENT
Start: 2021-04-02 | End: 2021-04-02 | Stop reason: SURG

## 2021-04-02 RX ORDER — PROPOFOL 10 MG/ML
VIAL (ML) INTRAVENOUS AS NEEDED
Status: DISCONTINUED | OUTPATIENT
Start: 2021-04-02 | End: 2021-04-02 | Stop reason: SURG

## 2021-04-02 RX ORDER — ONDANSETRON 2 MG/ML
INJECTION INTRAMUSCULAR; INTRAVENOUS AS NEEDED
Status: DISCONTINUED | OUTPATIENT
Start: 2021-04-02 | End: 2021-04-02 | Stop reason: SURG

## 2021-04-02 RX ORDER — HYDROCODONE BITARTRATE AND ACETAMINOPHEN 5; 325 MG/1; MG/1
1 TABLET ORAL EVERY 4 HOURS PRN
Status: DISCONTINUED | OUTPATIENT
Start: 2021-04-02 | End: 2021-04-05 | Stop reason: HOSPADM

## 2021-04-02 RX ORDER — FENTANYL CITRATE 50 UG/ML
INJECTION, SOLUTION INTRAMUSCULAR; INTRAVENOUS AS NEEDED
Status: DISCONTINUED | OUTPATIENT
Start: 2021-04-02 | End: 2021-04-02 | Stop reason: SURG

## 2021-04-02 RX ORDER — FLUMAZENIL 0.1 MG/ML
0.2 INJECTION INTRAVENOUS AS NEEDED
Status: DISCONTINUED | OUTPATIENT
Start: 2021-04-02 | End: 2021-04-02 | Stop reason: HOSPADM

## 2021-04-02 RX ORDER — SODIUM CHLORIDE, SODIUM LACTATE, POTASSIUM CHLORIDE, CALCIUM CHLORIDE 600; 310; 30; 20 MG/100ML; MG/100ML; MG/100ML; MG/100ML
50 INJECTION, SOLUTION INTRAVENOUS CONTINUOUS
Status: DISCONTINUED | OUTPATIENT
Start: 2021-04-02 | End: 2021-04-04

## 2021-04-02 RX ORDER — GUAIFENESIN 600 MG/1
1200 TABLET, EXTENDED RELEASE ORAL EVERY 12 HOURS SCHEDULED
Status: DISCONTINUED | OUTPATIENT
Start: 2021-04-02 | End: 2021-04-05 | Stop reason: HOSPADM

## 2021-04-02 RX ORDER — HYDROCODONE BITARTRATE AND ACETAMINOPHEN 5; 325 MG/1; MG/1
2 TABLET ORAL EVERY 4 HOURS PRN
Status: DISCONTINUED | OUTPATIENT
Start: 2021-04-02 | End: 2021-04-05 | Stop reason: HOSPADM

## 2021-04-02 RX ORDER — DEXAMETHASONE SODIUM PHOSPHATE 4 MG/ML
INJECTION, SOLUTION INTRA-ARTICULAR; INTRALESIONAL; INTRAMUSCULAR; INTRAVENOUS; SOFT TISSUE AS NEEDED
Status: DISCONTINUED | OUTPATIENT
Start: 2021-04-02 | End: 2021-04-02 | Stop reason: SURG

## 2021-04-02 RX ORDER — SODIUM CHLORIDE 0.9 % (FLUSH) 0.9 %
3-10 SYRINGE (ML) INJECTION AS NEEDED
Status: DISCONTINUED | OUTPATIENT
Start: 2021-04-02 | End: 2021-04-05 | Stop reason: HOSPADM

## 2021-04-02 RX ORDER — ACETAMINOPHEN 325 MG/1
650 TABLET ORAL ONCE AS NEEDED
Status: DISCONTINUED | OUTPATIENT
Start: 2021-04-02 | End: 2021-04-02 | Stop reason: HOSPADM

## 2021-04-02 RX ORDER — SODIUM CHLORIDE, SODIUM LACTATE, POTASSIUM CHLORIDE, CALCIUM CHLORIDE 600; 310; 30; 20 MG/100ML; MG/100ML; MG/100ML; MG/100ML
INJECTION, SOLUTION INTRAVENOUS CONTINUOUS PRN
Status: DISCONTINUED | OUTPATIENT
Start: 2021-04-02 | End: 2021-04-02 | Stop reason: SURG

## 2021-04-02 RX ADMIN — SODIUM CHLORIDE, POTASSIUM CHLORIDE, SODIUM LACTATE AND CALCIUM CHLORIDE 75 ML/HR: 600; 310; 30; 20 INJECTION, SOLUTION INTRAVENOUS at 13:15

## 2021-04-02 RX ADMIN — LIDOCAINE HYDROCHLORIDE 60 MG: 10 INJECTION, SOLUTION EPIDURAL; INFILTRATION; INTRACAUDAL; PERINEURAL at 14:41

## 2021-04-02 RX ADMIN — SODIUM CHLORIDE, SODIUM LACTATE, POTASSIUM CHLORIDE, AND CALCIUM CHLORIDE: .6; .31; .03; .02 INJECTION, SOLUTION INTRAVENOUS at 07:12

## 2021-04-02 RX ADMIN — Medication 10 ML: at 06:55

## 2021-04-02 RX ADMIN — GUAIFENESIN 1200 MG: 600 TABLET, EXTENDED RELEASE ORAL at 20:40

## 2021-04-02 RX ADMIN — SERTRALINE 50 MG: 50 TABLET, FILM COATED ORAL at 20:40

## 2021-04-02 RX ADMIN — FENTANYL CITRATE 50 MCG: 50 INJECTION, SOLUTION INTRAMUSCULAR; INTRAVENOUS at 07:17

## 2021-04-02 RX ADMIN — Medication 10 ML: at 20:40

## 2021-04-02 RX ADMIN — LIDOCAINE HYDROCHLORIDE 100 MG: 20 INJECTION, SOLUTION EPIDURAL; INFILTRATION; INTRACAUDAL; PERINEURAL at 07:18

## 2021-04-02 RX ADMIN — Medication 3 ML: at 20:40

## 2021-04-02 RX ADMIN — FENTANYL CITRATE 25 MCG: 50 INJECTION, SOLUTION INTRAMUSCULAR; INTRAVENOUS at 07:50

## 2021-04-02 RX ADMIN — SUGAMMADEX 200 MG: 100 INJECTION, SOLUTION INTRAVENOUS at 08:03

## 2021-04-02 RX ADMIN — PROPOFOL 50 MG: 10 INJECTION, EMULSION INTRAVENOUS at 14:41

## 2021-04-02 RX ADMIN — VANCOMYCIN HYDROCHLORIDE 1250 MG: 10 INJECTION, POWDER, LYOPHILIZED, FOR SOLUTION INTRAVENOUS at 21:20

## 2021-04-02 RX ADMIN — Medication 200 MCG: at 07:23

## 2021-04-02 RX ADMIN — PROPOFOL 40 MG: 10 INJECTION, EMULSION INTRAVENOUS at 14:44

## 2021-04-02 RX ADMIN — CEFEPIME HYDROCHLORIDE 2 G: 2 INJECTION, POWDER, FOR SOLUTION INTRAVENOUS at 05:15

## 2021-04-02 RX ADMIN — ROCURONIUM BROMIDE 30 MG: 10 INJECTION INTRAVENOUS at 07:19

## 2021-04-02 RX ADMIN — SODIUM CHLORIDE: 9 INJECTION, SOLUTION INTRAVENOUS at 14:36

## 2021-04-02 RX ADMIN — PROPOFOL 120 MG: 10 INJECTION, EMULSION INTRAVENOUS at 07:18

## 2021-04-02 RX ADMIN — CEFEPIME HYDROCHLORIDE 2 G: 2 INJECTION, POWDER, FOR SOLUTION INTRAVENOUS at 20:40

## 2021-04-02 RX ADMIN — GADOTERIDOL 12 ML: 279.3 INJECTION, SOLUTION INTRAVENOUS at 17:03

## 2021-04-02 RX ADMIN — GABAPENTIN 600 MG: 300 CAPSULE ORAL at 20:40

## 2021-04-02 RX ADMIN — ONDANSETRON 4 MG: 2 INJECTION INTRAMUSCULAR; INTRAVENOUS at 07:59

## 2021-04-02 RX ADMIN — Medication 200 MCG: at 07:59

## 2021-04-02 RX ADMIN — Medication 100 MCG: at 07:32

## 2021-04-02 RX ADMIN — DEXAMETHASONE SODIUM PHOSPHATE 4 MG: 4 INJECTION, SOLUTION INTRAMUSCULAR; INTRAVENOUS at 07:28

## 2021-04-02 NOTE — ANESTHESIA PREPROCEDURE EVALUATION
Anesthesia Evaluation     Patient summary reviewed and Nursing notes reviewed   NPO Solid Status: > 8 hours  NPO Liquid Status: > 8 hours           Airway   Mallampati: II  TM distance: >3 FB  Neck ROM: full  No difficulty expected  Dental - normal exam     Pulmonary - negative pulmonary ROS and normal exam   Cardiovascular - normal exam    (+) hypertension, hyperlipidemia,       Neuro/Psych  (+) CVA,     GI/Hepatic/Renal/Endo    (+)   diabetes mellitus,     Musculoskeletal (-) negative ROS    Abdominal  - normal exam    Bowel sounds: normal.   Substance History - negative use     OB/GYN negative ob/gyn ROS         Other   autoimmune disease ,      ROS/Med Hx Other: MS                  Anesthesia Plan    ASA 4     MAC   total IV anesthesia  intravenous induction     Anesthetic plan, all risks, benefits, and alternatives have been provided, discussed and informed consent has been obtained with: patient.    Plan discussed with CRNA.

## 2021-04-02 NOTE — ANESTHESIA POSTPROCEDURE EVALUATION
Patient: Vicente Singh    Procedure Summary     Date: 04/02/21 Room / Location: [facility] OR 09 / [facility] MAIN OR    Anesthesia Start: 0712 Anesthesia Stop: 0823    Procedure: DEBRIDEMENT OF ISCHEAL ULCER/BUTTOCKS WOUND (N/A Buttocks) Diagnosis:       Pressure injury of sacral region, unstageable (CMS/HCC)      (Pressure injury of sacral region, unstageable (CMS/HCC) [L89.150])    Surgeons: Abiodun Bonner MD Provider: Chaparro Patel MD    Anesthesia Type: general ASA Status: 4          Anesthesia Type: general    Vitals  Vitals Value Taken Time   /66 04/02/21 0906   Temp 98.1 °F (36.7 °C) 04/02/21 0904   Pulse 81 04/02/21 0909   Resp 13 04/02/21 0904   SpO2 90 % 04/02/21 0909   Vitals shown include unvalidated device data.        Post Anesthesia Care and Evaluation    Patient location during evaluation: PACU  Patient participation: complete - patient participated  Level of consciousness: awake  Pain scale: See nurse's notes for pain score.  Pain management: adequate  Airway patency: patent  Anesthetic complications: No anesthetic complications  PONV Status: none  Cardiovascular status: acceptable  Respiratory status: acceptable  Hydration status: acceptable    Comments: Patient seen and examined postoperatively; vital signs stable; SpO2 greater than or equal to 90%; cardiopulmonary status stable; nausea/vomiting adequately controlled; pain adequately controlled; no apparent anesthesia complications; patient discharged from anesthesia care when discharge criteria were met

## 2021-04-02 NOTE — ANESTHESIA PREPROCEDURE EVALUATION
Anesthesia Evaluation     Patient summary reviewed and Nursing notes reviewed   NPO Solid Status: > 8 hours  NPO Liquid Status: > 8 hours           Airway   Mallampati: II  TM distance: >3 FB  Neck ROM: full  No difficulty expected  Dental    (+) poor dentition    Pulmonary    Cardiovascular     (+) hypertension, hyperlipidemia,       Neuro/Psych  (+) CVA,     GI/Hepatic/Renal/Endo    (+)  GERD,  diabetes mellitus,     Musculoskeletal     Abdominal    Substance History      OB/GYN          Other        ROS/Med Hx Other: Hx of MS - bed bound, arms contracted                Anesthesia Plan    ASA 4     general     intravenous induction     Anesthetic plan, all risks, benefits, and alternatives have been provided, discussed and informed consent has been obtained with: patient.    Plan discussed with CRNA and CAA.

## 2021-04-02 NOTE — DISCHARGE PLACEMENT REQUEST
"Floyd Singh (80 y.o. Male)     Date of Birth Social Security Number Address Home Phone MRN    1940  [address] [place] [phone] [id number]    [employer] Marital Status          [employer]        Admission Date Admission Type Admitting Provider Attending Provider Department, Room/Bed    4/1/21 Urgent Fei Bruner MD Farley, Timothy Michael, MD [facility] 3C MEDICAL INPATIENT, 367/1    Discharge Date Discharge Disposition Discharge Destination                       Attending Provider: Fei Bruner MD    Allergies: No Known Allergies    Isolation: None   Infection: None   Code Status: No CPR    Ht: 180.3 cm (71\")   Wt: 62.6 kg (138 lb 0.1 oz)    Admission Cmt: None   Principal Problem: Sacral pressure ulcer [L89.159]                 Active Insurance as of 4/1/2021     Primary Coverage     Payor Plan Insurance Group Employer/Plan Group    MEDICARE MEDICARE A & B      Payor Plan Address Payor Plan Phone Number Payor Plan Fax Number Effective Dates    [address] [phone]  12/1/1996 - None Entered    [place] [facility] [id number]       Subscriber Name Subscriber Birth Date Member ID       FLOYD SINGH 1940 [id number]           Secondary Coverage     Payor Plan Insurance Group Employer/Plan Group    [place] [id number]     Payor Plan Address Payor Plan Phone Number Payor Plan Fax Number Effective Dates    [address]   1/1/2016 - None Entered    [facility] [id number]       Subscriber Name Subscriber Birth Date Member ID       FLOYD SINGH 1940 [id number]                 Emergency Contacts      (Rel.) Home Phone Work Phone Mobile Phone    SINGHSETH (Spouse) [phone] -- --              "

## 2021-04-02 NOTE — PROGRESS NOTES
Continued Stay Note  [facility]     Patient Name: Vicente Singh  MRN: [id number]  Today's Date: 4/2/2021    Admit Date: 4/1/2021    Discharge Plan     Row Name 04/02/21 1315       Plan    Plan  Needs case managed. Out of room. Is current with care tenders. Resume order entered. Referral made per epic   Phone communication or documentation only - no physical contact with patient or family.  Razia Link RN  Complex Case Manager  [facility] Care Coordination  [phone]-cell  [phone]-office  [phone]-fax  [email or web address]    Zoraida Link RN

## 2021-04-02 NOTE — OP NOTE
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  Risks of perforation, hemorrhage, adverse drug reaction and aspiration were discussed.  The patient was brought into the endoscopy suite. Continuous cardiopulmonary monitoring was performed. The patient was placed in the left lateral decubitus position.  The bite block was inserted into the patient's mouth. After adequate sedation was attained, the Olympus gastroscope was inserted into the patient's mouth and advanced to the second portion of the duodenum without difficulty.  Circumferential examination was performed. A retroflex exam was performed in the patient's  stomach.  On completion of the exam, the bowel was decompressed, the scope was removed from the patient, the patient tolerated the procedure well, there were no immediate post-operative complications.        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

## 2021-04-02 NOTE — CONSULTS
GENERAL SURGERY CONSULTATION NOTE    Consult requested by: Dr. Bruner    Patient Care Team:  Leland Santo MD as PCP - General  Jessenia Pederson RN as Registered Nurse    Reason for consult: Infected decubitus ulcer    Subjective     Patient is a 80 y.o. male presents with an infected sacral decubitus ulcer which has been followed at the wound care center by Dr Pierce.  The patient has a history of MS which has been present for almost 40 years, and has difficulty with mobility.  He also has difficulty with dysphagia.  He is chronically contractured and tends to spend much of his time in bed.  A few months ago, the patient was noted to have a sacral decubitus ulcer which prompted him to be seen at the wound care center.  This has been treated with Dakin's solution for the past 2 weeks or so, however he began to have a foul smell and drain yellow fluid at which point, the patient was directly admitted for surgical consideration of debridement.  Laboratory values reviewed.  Slight elevation of white blood cell count.  Patient with malnutrition.    Review of Systems   Constitutional: Negative for appetite change, chills, fatigue and fever.   HENT: Positive for trouble swallowing. Negative for congestion and sore throat.    Respiratory: Negative for cough and shortness of breath.    Cardiovascular: Negative for chest pain and palpitations.   Gastrointestinal: Negative for abdominal pain, constipation, diarrhea, nausea, vomiting and GERD.   Genitourinary: Negative for difficulty urinating, dysuria and frequency.   Musculoskeletal: Negative for arthralgias and back pain.   Skin: Positive for color change, rash, skin lesions and bruise.   Neurological: Negative for dizziness, seizures and memory problem.   Hematological: Negative for adenopathy. Does not bruise/bleed easily.   Psychiatric/Behavioral: Negative for sleep disturbance and depressed mood.        History  Past Medical History:   Diagnosis Date   •  Benign essential hypertension 4/1/2021   • Cerebrovascular accident (CVA) (CMS/HCC) 4/1/2021   • Hyperlipidemia 4/1/2021   • Multiple sclerosis (CMS/HCC) 4/1/2021   • Type 2 diabetes mellitus (CMS/[facility]) 4/1/2021     History reviewed. No pertinent surgical history.  Family History   Problem Relation Age of Onset   • No Known Problems Mother    • No Known Problems Father      Social History     Tobacco Use   • Smoking status: Never Smoker   • Smokeless tobacco: Never Used   Vaping Use   • Vaping Use: Never used   Substance Use Topics   • Alcohol use: Not Currently   • Drug use: Never     Medications Prior to Admission   Medication Sig Dispense Refill Last Dose   • amLODIPine (NORVASC) 10 MG tablet Take 1 tablet by mouth Daily.   4/1/2021 at 0900   • aspirin-dipyridamole (AGGRENOX)  MG per 12 hr capsule Take 1 capsule by mouth Every 12 (Twelve) Hours.   4/1/2021 at 2100   • gabapentin (NEURONTIN) 600 MG tablet Take 1 tablet by mouth 2 (two) times a day.   4/1/2021 at 0900   • omeprazole (priLOSEC) 20 MG capsule Take 1 capsule by mouth Daily.   4/1/2021 at 0900   • sertraline (ZOLOFT) 50 MG tablet Take 50 mg by mouth every night at bedtime.   3/31/2021 at 2100     Allergies:  Patient has no known allergies.    Objective     Vital Signs  Temp:  [97.3 °F (36.3 °C)-98.1 °F (36.7 °C)] 98.1 °F (36.7 °C)  Heart Rate:  [106-115] 115  Resp:  [16-20] 16  BP: (118-177)/() 118/69    Physical Exam  Constitutional:       General: He is not in acute distress.     Appearance: He is not ill-appearing.   Cardiovascular:      Rate and Rhythm: Normal rate and regular rhythm.   Pulmonary:      Effort: Pulmonary effort is normal. No respiratory distress.   Abdominal:      General: There is no distension.      Palpations: Abdomen is soft.      Tenderness: There is no abdominal tenderness.   Genitourinary:     Comments: Matos catheter in place  Musculoskeletal:         General: Deformity present. No swelling.      Comments: Chronic  contractures   Skin:     General: Skin is warm and dry.      Comments: The patient has an approximately 4 cm circular area of black/gray soupy eschar overlying his sacrum which is mushy and is consistent with infected, necrotic sacral decubitus ulcer.   Neurological:      Mental Status: He is alert and oriented to person, place, and time. Mental status is at baseline.   Psychiatric:         Mood and Affect: Mood normal.         Behavior: Behavior normal.         Results Review:   Lab Results (last 24 hours)     Procedure Component Value Units Date/Time    COVID PRE-OP / PRE-PROCEDURE SCREENING ORDER (NO ISOLATION) - Swab, Nasopharynx [[id number]] Collected: 04/01/21 2022    Specimen: Swab from Nasopharynx Updated: 04/01/21 2033    Narrative:      The following orders were created for panel order COVID PRE-OP / PRE-PROCEDURE SCREENING ORDER (NO ISOLATION) - Swab, Nasopharynx.  Procedure                               Abnormality         Status                     ---------                               -----------         ------                     COVID-19,CEPHEID,COR/ALFIE...[[id number]]                      In process                   Please view results for these tests on the individual orders.    COVID-19,CEPHEID,COR/ALFIE/PAD IN-HOUSE(OR EMERGENT/ADD-ON),NP SWAB IN TRANSPORT MEDIA 3-4 HR TAT, RT-PCR - Swab, Nasopharynx [[id number]] Collected: 04/01/21 2022    Specimen: Swab from Nasopharynx Updated: 04/01/21 2033    Comprehensive Metabolic Panel [[id number]]  (Abnormal) Collected: 04/01/21 1725    Specimen: Blood Updated: 04/01/21 1756     Glucose 145 mg/dL      BUN 26 mg/dL      Creatinine 0.96 mg/dL      Sodium 142 mmol/L      Potassium 4.4 mmol/L      Chloride 104 mmol/L      CO2 26.0 mmol/L      Calcium 9.0 mg/dL      Total Protein 8.7 g/dL      Albumin 3.60 g/dL      ALT (SGPT) 30 U/L      AST (SGOT) 21 U/L      Alkaline Phosphatase 69 U/L      Total Bilirubin 0.3 mg/dL      eGFR Non African Amer 76 mL/min/1.73       Globulin 5.1 gm/dL      A/G Ratio 0.7 g/dL      BUN/Creatinine Ratio 27.1     Anion Gap 12.0 mmol/L     Narrative:      GFR Normal >60  Chronic Kidney Disease <60  Kidney Failure <15      Lactic Acid, Plasma [[id number]]  (Normal) Collected: 04/01/21 1725    Specimen: Blood Updated: 04/01/21 1751     Lactate 1.6 mmol/L     Sedimentation Rate [[id number]]  (Abnormal) Collected: 04/01/21 1725    Specimen: Blood Updated: 04/01/21 1749     Sed Rate >130 mm/hr     CBC & Differential [[id number]]  (Abnormal) Collected: 04/01/21 1725    Specimen: Blood Updated: 04/01/21 1737    Narrative:      The following orders were created for panel order CBC & Differential.  Procedure                               Abnormality         Status                     ---------                               -----------         ------                     CBC Auto Differential[[id number]]        Abnormal            Final result                 Please view results for these tests on the individual orders.    CBC Auto Differential [[id number]]  (Abnormal) Collected: 04/01/21 1725    Specimen: Blood Updated: 04/01/21 1737     WBC 12.60 10*3/mm3      RBC 5.65 10*6/mm3      Hemoglobin 13.9 g/dL      Hematocrit 43.9 %      MCV 77.7 fL      MCH 24.6 pg      MCHC 31.7 g/dL      RDW 16.2 %      RDW-SD 43.3 fl      MPV 7.3 fL      Platelets 508 10*3/mm3      Neutrophil % 81.6 %      Lymphocyte % 8.1 %      Monocyte % 9.1 %      Eosinophil % 0.4 %      Basophil % 0.8 %      Neutrophils, Absolute 10.30 10*3/mm3      Lymphocytes, Absolute 1.00 10*3/mm3      Monocytes, Absolute 1.10 10*3/mm3      Eosinophils, Absolute 0.10 10*3/mm3      Basophils, Absolute 0.10 10*3/mm3      nRBC 0.1 /100 WBC     Blood Culture - Blood, Arm, Right [[id number]] Collected: 04/01/21 1715    Specimen: Blood from Arm, Right Updated: 04/01/21 1734    Blood Culture - Blood, Arm, Left [[id number]] Collected: 04/01/21 1725    Specimen: Blood from Arm, Left Updated: 04/01/21 1733         XR Chest 1 View    Result Date: 4/1/2021  1. There is a poorly defined increased density in the left lower chest, which may be due to a large area of airspace consolidation from pneumonia or atelectasis. A lung mass is not excluded on this exam. There are no priors available. Recommend follow-up with PA and lateral views of the chest or CT chest.  Electronically Signed By-Jane Dorado MD On:4/1/2021 5:59 PM This report was finalized on [id number] by  Jane Dorado MD.        I reviewed the patient's new imaging results and agree with the interpretation.  I reviewed the patient's other test results and agree with the interpretation    Assessment/Plan     Principal Problem:    Sacral pressure ulcer  Active Problems:    Benign essential hypertension    Hyperlipidemia    Multiple sclerosis (CMS/HCC)    Type 2 diabetes mellitus (CMS/HCC)    GERD without esophagitis    History of CVA (cerebrovascular accident)    Neurogenic bladder    Mild malnutrition (CMS/HCC)    Bowel incontinence    Pressure injury of sacral region, unstageable (CMS/HCC)    Patient with an infected, necrotic sacral decubitus ulcer.  Recommend debridement in the operating room tomorrow.  Discussed with the patient and wife at bedside.  Both of whom agree.  Risk, benefits, and alternatives discussed with the patient and his wife.  These include bleeding, pain, need for further debridements.  Continue empiric antibiotics.  Will likely change wound care from Dakin's solution tomorrow to Betadine soaked gauze packing.    I discussed the patients findings and my recommendations with the patient and his wife.     Abiodun Bonner MD  04/01/21  20:34 EDT

## 2021-04-02 NOTE — PROGRESS NOTES
[facility] Medicine Services Daily Progress Note      Hospitalist Team  LOS 1 days      Patient Care Team:  Leland Santo MD as PCP - General  Jessenia Pederson RN as Registered Nurse    Patient Location: 367/1      Subjective   Subjective     Chief Complaint / Subjective  No chief complaint on file.    Patient seen postoperatively this morning after wound debridement.  Patient somnolent but arousable.  Patient's wife at bedside, patient with no obvious concerns at this time.  Patient with significant weight loss and debility, wife is reporting that he has had long history of dysphagia associated with his MS and has multiple dilations reported more issues with food as of late wanted to see gastroenterology.  GI to be consulted.    Brief Synopsis of Hospital Course/HPI  Mr. Singh is a 80 y.o. male with a past medical history of hypertension, CVA, hyperlipidemia, MS, and type 2 diabetes mellitus who was directly admitted to Psychiatric ED by Dr. Graham from [facility] wound care center on 4/1/2021 due to an necrotic sacral wound.  Hospital accepted the patient for further care management.  Per patient's wife this all started in December 2020 when she noticed a pressure ulcer on his sacrum.  She had home health coming out and eventually they decided he would need to go to the wound care center because it started to drain yellow discharge and have a foul odor.  Patient started to go to the wound care center 1 month ago and was supposed to follow-up last Thursday, however had a fever so he did not follow-up at that time.  Patient's wife was instructed to bring patient to the ED if his fever got above 101.0.  She ended up bringing him to the wound care center today for follow-up.  She reports he has had multiple pressure ulcers in the past that were just red spots, but would eventually heal.  She explains he has a neurogenic bladder and has a chronic Matos catheter.  She  "also explains that he has bowel incontinence and she feeds him his meals due to current contracted arms.  Patient was able to deny he is in pain.  Patient is currently alert and oriented x3. ID and general surgery were consulted upon admission. Patient was started on empiric antibiotics and wound culture was collected at the Wound care center.          Date::          Review of Systems   Unable to perform ROS: patient nonverbal         Objective   Objective      Vital Signs  Temp:  [97.3 °F (36.3 °C)-99.6 °F (37.6 °C)] 98.1 °F (36.7 °C)  Heart Rate:  [] 82  Resp:  [13-20] 13  BP: (112-177)/() 112/68  Oxygen Therapy  SpO2: 90 %  Pulse Oximetry Type: Continuous  Device (Oxygen Therapy): room air  Flow (L/min): 2  Flowsheet Rows      First Filed Value   Admission Height  180.3 cm (71\") Documented at 04/01/2021 1645   Admission Weight  60.8 kg (134 lb) Documented at 04/01/2021 1645        Intake & Output (last 3 days)       03/30 0701 - 03/31 0700 03/31 0701 - 04/01 0700 04/01 0701 - 04/02 0700 04/02 0701 - 04/03 0700    P.O.   200     I.V. (mL/kg)    850 (13.6)    Total Intake(mL/kg)   200 (3.2) 850 (13.6)    Urine (mL/kg/hr)   825     Blood    15    Total Output   825 15    Net   -625 +835                Lines, Drains & Airways    Active LDAs     Name:   Placement date:   Placement time:   Site:   Days:    Peripheral IV 04/01/21 2021 Anterior;Right Forearm   04/01/21 2021    Forearm   less than 1    Urethral Catheter   04/01/21    1500 unknown placement date and time     less than 1                  Physical Exam:    Physical Exam    General: Frail elderly male sitting up in wheelchair breathing comfortably on room air no acute distress  HEENT: NC/AT, EOMI, mucosa dry  Heart: Regular, rate controlled  Chest: Normal work of breathing, moving air well no wheezing  Abdominal: Soft. NT/ND.   Musculoskeletal: Diminished range of motion, arm contractures noted bilaterally.  No edema. No calf " tenderness.  Neurological: Somnolent but arousable, spasticity noted  Skin: Skin is warm and dry.  Unstageable ulcer present, wound dressing clean dry and intact  Psychiatric: Normal mood and affect.          Wounds (last 24 hours)      LDA Wound     Row Name 04/02/21 0904 04/02/21 0849 04/02/21 0834       Wound 04/02/21 0643 sacral spine    Wound - Properties Group Placement Date: 04/02/21  -KK Placement Time: 0643 -KK Location: sacral spine  -KK Stage, Pressure Injury : unstageable  -KK    Retired Wound - Properties Group Date first assessed: 04/02/21 -KK Time first assessed: 0643 -KK Location: sacral spine  -KK       Wound 04/01/21 1700 Right medial gluteal Degloving injury    Wound - Properties Group Placement Date: 04/01/21  -BJ Placement Time: 1700 -BJ Present on Hospital Admission: Y  -BJ Side: Right  -BJ Orientation: medial  -BJ Location: gluteal  -BJ Primary Wound Type: Degloving in  -BJ Stage, Pressure Injury : deep tissue injury  -BJ    Retired Wound - Properties Group Date first assessed: 04/01/21 -BJ Time first assessed: 1700  -BJ Present on Hospital Admission: Y  -BJ Side: Right  -BJ Location: gluteal  -BJ Primary Wound Type: Degloving in  -BJ       Wound 04/02/21 0739 sacral spine Incision    Wound - Properties Group Placement Date: 04/02/21  -AM Placement Time: 0739  -AM Location: sacral spine  -AM Primary Wound Type: Incision  -AM    Dressing Appearance  dry;intact;no drainage  -NS  intact;dry;no drainage  -NS  dry;intact;no drainage  -NS    Closure  KERRIE Betadine soaked kerlix, fluffs, abd, paper tape  -NS  KERRIE Betadine soaked kerlix, fluffs, abd, paper tape  -NS  KERRIE Betadine soaked kerlix, fluffs, abd, paper tape  -NS    Base  dressing in place, unable to visualize  -NS  dressing in place, unable to visualize  -NS  dressing in place, unable to visualize  -NS    Retired Wound - Properties Group Date first assessed: 04/02/21  -AM Time first assessed: 0739  -AM Location: sacral spine  -AM  Primary Wound Type: Incision  -AM    Row Name 04/02/21 0819 04/02/21 0748 04/02/21 0643       Wound 04/02/21 0643 sacral spine    Wound - Properties Group Placement Date: 04/02/21  -KK Placement Time: 0643 -KK Location: sacral spine  -KK Stage, Pressure Injury : unstageable  -KK    Wound Image  --  --  Images linked: 2  -KK    Dressing Appearance  --  --  open to air  -KK    Closure  --  --  Open to air  -KK    Base  --  --  moist;necrotic  -KK    Periwound  --  --  non-blanchable;redness  -KK    Periwound Skin Turgor  --  --  soft  -KK    Drainage Amount  --  --  scant  -KK    Care, Wound  --  --  dressing removed  -KK    Dressing Care  --  --  dressing removed  -KK    Retired Wound - Properties Group Date first assessed: 04/02/21  -KK Time first assessed: 0643  -KK Location: sacral spine  -KK       Wound 04/01/21 1700 Right medial gluteal Degloving injury    Wound - Properties Group Placement Date: 04/01/21  -BJ Placement Time: 1700  -BJ Present on Hospital Admission: Y  -BJ Side: Right  -BJ Orientation: medial  -BJ Location: gluteal  -BJ Primary Wound Type: Degloving in  -BJ Stage, Pressure Injury : deep tissue injury  -BJ    Retired Wound - Properties Group Date first assessed: 04/01/21 -BJ Time first assessed: 1700  -BJ Present on Hospital Admission: Y  -BJ Side: Right  -BJ Location: gluteal  -BJ Primary Wound Type: Degloving in  -BJ       Wound 04/02/21 0739 sacral spine Incision    Wound - Properties Group Placement Date: 04/02/21  -AM Placement Time: 0739  -AM Location: sacral spine  -AM Primary Wound Type: Incision  -AM    Dressing Appearance  dry;intact;no drainage  -NS  --  --    Closure  KERRIE Betadine soaked kerlix, fluffs, abd, paper tape  -NS  --  --    Base  dressing in place, unable to visualize  -NS  --  --    Dressing Care  --  dressing applied betadine soaked kerlix, fluffs, abd, paper tape  -AM  --    Retired Wound - Properties Group Date first assessed: 04/02/21  -AM Time first assessed: 0739   -AM Location: sacral spine  -AM Primary Wound Type: Incision  -AM      User Key  (r) = Recorded By, (t) = Taken By, (c) = Cosigned By    Initials Name Provider Type    AM Frances Sanchez, RN Registered Nurse    Haily Grace RN Registered Nurse    NS Sprigler, Naomi, RN Registered Nurse    Jessenia Mathias RN Registered Nurse          Procedures:    Procedure(s):  DEBRIDEMENT OF ISCHEAL ULCER/BUTTOCKS WOUND          Results Review:     I reviewed the patient's new clinical results.      Lab Results (last 24 hours)     Procedure Component Value Units Date/Time    POC Glucose Once [[id number]]  (Abnormal) Collected: 04/02/21 0822    Specimen: Blood Updated: 04/02/21 0824     Glucose 121 mg/dL      Comment: Serial Number: [id number]:  [id number]       POC Glucose Once [[id number]]  (Abnormal) Collected: 04/02/21 0708    Specimen: Blood Updated: 04/02/21 0713     Glucose 124 mg/dL      Comment: Serial Number: [id number]:  [id number]       Comprehensive Metabolic Panel [[id number]]  (Abnormal) Collected: 04/02/21 0338    Specimen: Blood Updated: 04/02/21 0542     Glucose 99 mg/dL      BUN 23 mg/dL      Creatinine 0.79 mg/dL      Sodium 138 mmol/L      Potassium 4.0 mmol/L      Comment: Slight hemolysis detected by analyzer. Results may be affected.        Chloride 102 mmol/L      CO2 24.0 mmol/L      Calcium 8.4 mg/dL      Total Protein 7.4 g/dL      Albumin 2.90 g/dL      ALT (SGPT) 24 U/L      AST (SGOT) 20 U/L      Alkaline Phosphatase 59 U/L      Total Bilirubin 0.2 mg/dL      eGFR Non African Amer 95 mL/min/1.73      Globulin 4.5 gm/dL      A/G Ratio 0.6 g/dL      BUN/Creatinine Ratio 29.1     Anion Gap 12.0 mmol/L     Narrative:      GFR Normal >60  Chronic Kidney Disease <60  Kidney Failure <15      Magnesium [[id number]]  (Normal) Collected: 04/02/21 0338    Specimen: Blood Updated: 04/02/21 0542     Magnesium 1.9 mg/dL     CBC & Differential [[id number]]  (Abnormal) Collected: 04/02/21 0338     Specimen: Blood Updated: 04/02/21 0515    Narrative:      The following orders were created for panel order CBC & Differential.  Procedure                               Abnormality         Status                     ---------                               -----------         ------                     CBC Auto Differential[[id number]]        Abnormal            Final result                 Please view results for these tests on the individual orders.    CBC Auto Differential [[id number]]  (Abnormal) Collected: 04/02/21 0338    Specimen: Blood Updated: 04/02/21 0515     WBC 12.00 10*3/mm3      RBC 4.72 10*6/mm3      Hemoglobin 11.6 g/dL      Comment: Result checked         Hematocrit 36.3 %      MCV 76.9 fL      MCH 24.7 pg      MCHC 32.1 g/dL      RDW 16.2 %      RDW-SD 43.8 fl      MPV 7.6 fL      Platelets 497 10*3/mm3      Neutrophil % 74.1 %      Lymphocyte % 13.3 %      Monocyte % 10.9 %      Eosinophil % 0.8 %      Basophil % 0.9 %      Neutrophils, Absolute 8.90 10*3/mm3      Lymphocytes, Absolute 1.60 10*3/mm3      Monocytes, Absolute 1.30 10*3/mm3      Eosinophils, Absolute 0.10 10*3/mm3      Basophils, Absolute 0.10 10*3/mm3      nRBC 0.1 /100 WBC     MRSA Screen, PCR (Inpatient) - Swab, Nares [[id number]]  (Normal) Collected: 04/01/21 2200    Specimen: Swab from Nares Updated: 04/02/21 0056     MRSA PCR No MRSA Detected    COVID PRE-OP / PRE-PROCEDURE SCREENING ORDER (NO ISOLATION) - Swab, Nasopharynx [[id number]]  (Normal) Collected: 04/01/21 2022    Specimen: Swab from Nasopharynx Updated: 04/01/21 2217    Narrative:      The following orders were created for panel order COVID PRE-OP / PRE-PROCEDURE SCREENING ORDER (NO ISOLATION) - Swab, Nasopharynx.  Procedure                               Abnormality         Status                     ---------                               -----------         ------                     COVID-19,CEPHEID,COR/ALFIE...[[id number]]  Normal              Final result                  Please view results for these tests on the individual orders.    COVID-19,CEPHEID,COR/ALFIE/PAD IN-HOUSE(OR EMERGENT/ADD-ON),NP SWAB IN TRANSPORT MEDIA 3-4 HR TAT, RT-PCR - Swab, Nasopharynx [[id number]]  (Normal) Collected: 04/01/21 2022    Specimen: Swab from Nasopharynx Updated: 04/01/21 2217     COVID19 Not Detected    Narrative:      Fact sheet for providers: https://www.fda.gov/media/645997/download     Fact sheet for patients: https://www.fda.gov/media/658127/download    C-reactive Protein [[id number]]  (Abnormal) Collected: 04/01/21 1725    Specimen: Blood Updated: 04/01/21 2134     C-Reactive Protein 19.39 mg/dL     Comprehensive Metabolic Panel [[id number]]  (Abnormal) Collected: 04/01/21 1725    Specimen: Blood Updated: 04/01/21 1756     Glucose 145 mg/dL      BUN 26 mg/dL      Creatinine 0.96 mg/dL      Sodium 142 mmol/L      Potassium 4.4 mmol/L      Chloride 104 mmol/L      CO2 26.0 mmol/L      Calcium 9.0 mg/dL      Total Protein 8.7 g/dL      Albumin 3.60 g/dL      ALT (SGPT) 30 U/L      AST (SGOT) 21 U/L      Alkaline Phosphatase 69 U/L      Total Bilirubin 0.3 mg/dL      eGFR Non African Amer 76 mL/min/1.73      Globulin 5.1 gm/dL      A/G Ratio 0.7 g/dL      BUN/Creatinine Ratio 27.1     Anion Gap 12.0 mmol/L     Narrative:      GFR Normal >60  Chronic Kidney Disease <60  Kidney Failure <15      Lactic Acid, Plasma [[id number]]  (Normal) Collected: 04/01/21 1725    Specimen: Blood Updated: 04/01/21 1751     Lactate 1.6 mmol/L     Sedimentation Rate [[id number]]  (Abnormal) Collected: 04/01/21 1725    Specimen: Blood Updated: 04/01/21 1749     Sed Rate >130 mm/hr     CBC & Differential [[id number]]  (Abnormal) Collected: 04/01/21 1725    Specimen: Blood Updated: 04/01/21 1737    Narrative:      The following orders were created for panel order CBC & Differential.  Procedure                               Abnormality         Status                     ---------                               -----------          ------                     CBC Auto Differential[[id number]]        Abnormal            Final result                 Please view results for these tests on the individual orders.    CBC Auto Differential [[id number]]  (Abnormal) Collected: 04/01/21 1725    Specimen: Blood Updated: 04/01/21 1737     WBC 12.60 10*3/mm3      RBC 5.65 10*6/mm3      Hemoglobin 13.9 g/dL      Hematocrit 43.9 %      MCV 77.7 fL      MCH 24.6 pg      MCHC 31.7 g/dL      RDW 16.2 %      RDW-SD 43.3 fl      MPV 7.3 fL      Platelets 508 10*3/mm3      Neutrophil % 81.6 %      Lymphocyte % 8.1 %      Monocyte % 9.1 %      Eosinophil % 0.4 %      Basophil % 0.8 %      Neutrophils, Absolute 10.30 10*3/mm3      Lymphocytes, Absolute 1.00 10*3/mm3      Monocytes, Absolute 1.10 10*3/mm3      Eosinophils, Absolute 0.10 10*3/mm3      Basophils, Absolute 0.10 10*3/mm3      nRBC 0.1 /100 WBC     Blood Culture - Blood, Arm, Right [[id number]] Collected: 04/01/21 1715    Specimen: Blood from Arm, Right Updated: 04/01/21 1734    Blood Culture - Blood, Arm, Left [[id number]] Collected: 04/01/21 1725    Specimen: Blood from Arm, Left Updated: 04/01/21 1733        No results found for: HGBA1C            No results found for: LIPASE  No results found for: CHOL, CHLPL, TRIG, HDL, LDL, LDLDIRECT    No results found for: INTRAOP, PREDX, FINALDX, COMDX    Microbiology Results (last 10 days)     Procedure Component Value - Date/Time    MRSA Screen, PCR (Inpatient) - Swab, Nares [[id number]]  (Normal) Collected: 04/01/21 2200    Lab Status: Final result Specimen: Swab from Nares Updated: 04/02/21 0056     MRSA PCR No MRSA Detected    COVID PRE-OP / PRE-PROCEDURE SCREENING ORDER (NO ISOLATION) - Swab, Nasopharynx [[id number]]  (Normal) Collected: 04/01/21 2022    Lab Status: Final result Specimen: Swab from Nasopharynx Updated: 04/01/21 2217    Narrative:      The following orders were created for panel order COVID PRE-OP / PRE-PROCEDURE SCREENING ORDER (NO  ISOLATION) - Swab, Nasopharynx.  Procedure                               Abnormality         Status                     ---------                               -----------         ------                     COVID-19,CEPHEID,COR/ALFIE...[[id number]]  Normal              Final result                 Please view results for these tests on the individual orders.    COVID-19,CEPHEID,COR/ALFIE/PAD IN-HOUSE(OR EMERGENT/ADD-ON),NP SWAB IN TRANSPORT MEDIA 3-4 HR TAT, RT-PCR - Swab, Nasopharynx [[id number]]  (Normal) Collected: 04/01/21 2022    Lab Status: Final result Specimen: Swab from Nasopharynx Updated: 04/01/21 2217     COVID19 Not Detected    Narrative:      Fact sheet for providers: https://www.fda.gov/media/519695/download     Fact sheet for patients: https://www.fda.gov/media/455104/download    Wound Culture - Wound, Coccyx [[id number]]  (Abnormal) Collected: 04/01/21 1420    Lab Status: Preliminary result Specimen: Wound from Coccyx Updated: 04/02/21 1015     Wound Culture Light growth (2+) Enterococcus species      Scant growth (1+) Staphylococcus aureus     Gram Stain No organisms seen          ECG/EMG Results (most recent)     None                  XR Chest 1 View    Result Date: 4/1/2021  1. There is a poorly defined increased density in the left lower chest, which may be due to a large area of airspace consolidation from pneumonia or atelectasis. A lung mass is not excluded on this exam. There are no priors available. Recommend follow-up with PA and lateral views of the chest or CT chest.  Electronically Signed By-Jane Dorado MD On:4/1/2021 5:59 PM This report was finalized on [id number] by  Jane Dorado MD.          Xrays, labs reviewed personally by physician.    Medication Review:   I have reviewed the patient's current medication list      Scheduled Meds  amLODIPine, 10 mg, Oral, Daily  cefepime, 2 g, Intravenous, Q8H  [MAR Hold] enoxaparin, 40 mg, Subcutaneous, Q24H  gabapentin, 600 mg, Oral,  Q12H  guaiFENesin, 1,200 mg, Oral, Q12H  [MAR Hold] pantoprazole, 40 mg, Oral, QAM AC  [MAR Hold] sertraline, 50 mg, Oral, Nightly  [MAR Hold] sodium chloride, 10 mL, Intravenous, Q12H  vancomycin, 1,250 mg, Intravenous, Q24H        Meds Infusions  Pharmacy to Dose enoxaparin (LOVENOX),   Pharmacy to dose vancomycin,         Meds PRN  •  [MAR Hold] acetaminophen **OR** [MAR Hold] acetaminophen **OR** [MAR Hold] acetaminophen  •  hydrALAZINE  •  [MAR Hold] magnesium sulfate **OR** [MAR Hold] magnesium sulfate in D5W 1g/100mL (PREMIX)  •  [MAR Hold] ondansetron **OR** [MAR Hold] ondansetron  •  Pharmacy to Dose enoxaparin (LOVENOX)  •  Pharmacy to dose vancomycin  •  [MAR Hold] potassium chloride  •  [MAR Hold] sodium chloride        Assessment/Plan   Assessment/Plan     Active Hospital Problems    Diagnosis  POA   • **Sacral pressure ulcer [L89.159]  Yes   • Benign essential hypertension [I10]  Yes   • Hyperlipidemia [E78.5]  Yes   • Multiple sclerosis (CMS/HCC) [G35]  Yes   • Type 2 diabetes mellitus (CMS/HCC) [E11.9]  Yes   • GERD without esophagitis [K21.9]  Yes   • History of CVA (cerebrovascular accident) [Z86.73]  Not Applicable   • Neurogenic bladder [N31.9]  Yes   • Mild malnutrition (CMS/HCC) [E44.1]  Yes   • Bowel incontinence [R15.9]  Yes   • Pressure injury of sacral region, unstageable (CMS/HCC) [L89.150]  Unknown      Resolved Hospital Problems   No resolved problems to display.       MEDICAL DECISION MAKING COMPLEXITY BY PROBLEM:     Infected pressure ulcer-patient appearing debilitated with significant sequelae from multiple sclerosis likely to have poor healing, also with poor nutrition.  Patient with surrounding cellulitic changes  -Wound care  -Nutrition consult  -IV antibiotics with Zosyn initially but wound growing staph aureus switch to vancomycin cefepime  -ID consult  -Likely MRI  -General surgery consulted  -Inflammatory markers significantly elevated  -Check A1c  -Blood cultures  -Culture  wound, growing Enterococcus and staph aureus unspecified if MRSA at this time ID switched antibiotics to vancomycin and cefepime  -Daily CBC     Leukocytosis-due to underlying infected ulcer  -Continue antibiotics  -Check daily    Thrombocytosis-likely reactive due to underlying infection  -Monitor daily    Multiple sclerosis-with significant sequelae including multiple contractures chronic debility and signs of wasting  -Continue home medications  -PT/OT  -Patient's wife reports patient has issues with his meals, has copious thick secretions with eating and inhibits his ability to tolerate p.o.  -Added Mucinex  -GI consult    Hypertension/GERD/depression-chronic in nature  -Resume home medication as clinically appropriate    CVA-patient previously reported, on antiplatelets  -Continue    Neurogenic bladder-due to MS  -Continue Matos cath    VTE Prophylaxis -   Mechanical Order History:     None      Pharmalogical Order History:      Ordered     Dose Route Frequency Stop    04/01/21 1739  [MAR Hold]  enoxaparin (LOVENOX) syringe 40 mg     (MAR Hold since Fri 4/2/2021 at 0632.Hold Reason: Unreviewed Transfer Orders.)    40 mg SC Every 24 Hours Scheduled --    04/01/21 1734  Pharmacy to Dose enoxaparin (LOVENOX)      -- XX Continuous PRN --                  Code Status -   Code Status and Medical Interventions:   Ordered at: 04/01/21 1709     Limited Support to NOT Include:    Intubation     Level Of Support Discussed With:    Health Care Surrogate     Code Status:    No CPR     Medical Interventions (Level of Support Prior to Arrest):    Limited       This patient has been examined wearing appropriate Personal Protective Equipment and discussed with hospital infection control department. 04/02/21        Discharge Planning  pending        Electronically signed by Fei Bruner MD, 04/02/21, 12:09 EDT.  [facility] Hospitalist Team

## 2021-04-02 NOTE — ANESTHESIA PROCEDURE NOTES
Airway  Urgency: elective    Date/Time: 4/2/2021 7:21 AM  Airway not difficult    General Information and Staff    Patient location during procedure: OR  Anesthesiologist: Andrew Lawson MD  CRNA: Isabel Rodriguez AA    Indications and Patient Condition  Indications for airway management: airway protection    Preoxygenated: yes  MILS maintained throughout  Mask difficulty assessment: 2 - vent by mask + OA or adjuvant +/- NMBA    Final Airway Details  Final airway type: endotracheal airway      Successful airway: ETT  Cuffed: yes   Successful intubation technique: direct laryngoscopy  Facilitating devices/methods: intubating stylet and cricoid pressure  Endotracheal tube insertion site: oral  Blade: Leonel  Blade size: 4  ETT size (mm): 7.5  Cormack-Lehane Classification: grade I - full view of glottis  Placement verified by: chest auscultation and capnometry   Cuff volume (mL): 6  Measured from: gums  ETT/EBT to gums (cm): 22  Number of attempts at approach: 1  Assessment: lips, teeth, and gum same as pre-op and atraumatic intubation

## 2021-04-02 NOTE — CONSULTS
Infectious Diseases Consult Note    Referring Provider: Fei Bruner,*    Reason for Consultation: Sacral wound  Patient Care Team:  Leland Santo MD as PCP - General  Jessenia Pederson RN as Registered Nurse    Chief complaint the patient is nonverbal    Subjective     History of present illness:      This is 80-year-old white male who was hospitalized [facility] on April 1, 2021.  He was direct admit from the wound care clinic.  The patient had a wound on his sacrum for the last month and he is apparently being followed by the wound care clinic.  The patient wound had worsened and required to be admitted to the hospital.  The patient underwent debridement by general surgery service earlier today.  The patient is nonverbal and his wife was present at the bedside and she was very good historian and apparently she was taking care of him at home.  He was diagnosed with multiple sclerosis and he has been bedridden for the last 10 years.  He is currently on no treatment for multiple sclerosis.  Patient has a chronic indwelling Matos catheter and the wife been changing it weekly    Review of Systems   Review of Systems   Unable to perform ROS: Mental status change   Skin: Positive for wound.       Medications  Medications Prior to Admission   Medication Sig Dispense Refill Last Dose   • amLODIPine (NORVASC) 10 MG tablet Take 1 tablet by mouth Daily.   4/1/2021 at 1900   • aspirin-dipyridamole (AGGRENOX)  MG per 12 hr capsule Take 1 capsule by mouth Every 12 (Twelve) Hours.   4/1/2021 at 0900   • gabapentin (NEURONTIN) 600 MG tablet Take 1 tablet by mouth 2 (two) times a day.   4/1/2021 at 2120   • omeprazole (priLOSEC) 20 MG capsule Take 1 capsule by mouth Daily.   4/1/2021 at 0900   • sertraline (ZOLOFT) 50 MG tablet Take 50 mg by mouth every night at bedtime.   3/31/2021 at 2120       History  Past Medical History:   Diagnosis Date   • Benign essential hypertension 4/1/2021   •  Cerebrovascular accident (CVA) (CMS/[facility]) 2008   • Chronic indwelling Matos catheter    • Hyperlipidemia 4/1/2021   • Multiple sclerosis (CMS/[facility]) 4/1/2021   • Neurogenic bladder    • Sacral pressure sore    • Sleep apnea    • Stool incontinence    • Type 2 diabetes mellitus (CMS/[facility]) 4/1/2021     History reviewed. No pertinent surgical history.    Family History  Family History   Problem Relation Age of Onset   • No Known Problems Mother    • No Known Problems Father        Social History   reports that he has never smoked. He has never used smokeless tobacco. He reports previous alcohol use. He reports that he does not use drugs.    Allergies  Patient has no known allergies.    Objective     Vital Signs   Vital Signs (last 24 hours)       04/01 0700  -  04/02 0659 04/02 0700  -  04/02 1805   Most Recent    Temp (°F) 97.3 -  99.6    98.1 -  98.5     98.1 (36.7)    Heart Rate 95 -  115    77 -  87     82    Resp 16 -  20    13 -  17     15    /69 -  177/102    105/62 -  119/68     107/66    SpO2 (%) 91 -  96    90 -  94     91          Physical Exam:  Physical Exam  Vitals and nursing note reviewed.   Constitutional:       Appearance: He is well-developed.      Comments: Awake with no obvious acute distress   HENT:      Head: Normocephalic and atraumatic.   Eyes:      Pupils: Pupils are equal, round, and reactive to light.   Cardiovascular:      Rate and Rhythm: Normal rate and regular rhythm.      Heart sounds: Normal heart sounds.   Pulmonary:      Effort: Pulmonary effort is normal. No respiratory distress.      Breath sounds: Normal breath sounds. No wheezing or rales.   Abdominal:      General: Bowel sounds are normal. There is no distension.      Palpations: Abdomen is soft. There is no mass.      Tenderness: There is no abdominal tenderness. There is no guarding or rebound.   Musculoskeletal:      Cervical back: Normal range of motion and neck supple.      Comments: Contractions of the upper extremities    Skin:     General: Skin is warm.      Findings: No erythema or rash.      Comments: Recently debrided sacral wound.  Images were reviewed.  The wound is currently surgically packed         Microbiology  Microbiology Results (last 10 days)     Procedure Component Value - Date/Time    MRSA Screen, PCR (Inpatient) - Swab, Nares [[id number]]  (Normal) Collected: 04/01/21 2200    Lab Status: Final result Specimen: Swab from Nares Updated: 04/02/21 0056     MRSA PCR No MRSA Detected    COVID PRE-OP / PRE-PROCEDURE SCREENING ORDER (NO ISOLATION) - Swab, Nasopharynx [[id number]]  (Normal) Collected: 04/01/21 2022    Lab Status: Final result Specimen: Swab from Nasopharynx Updated: 04/01/21 2217    Narrative:      The following orders were created for panel order COVID PRE-OP / PRE-PROCEDURE SCREENING ORDER (NO ISOLATION) - Swab, Nasopharynx.  Procedure                               Abnormality         Status                     ---------                               -----------         ------                     COVID-19,CEPHEID,COR/ALFIE...[[id number]]  Normal              Final result                 Please view results for these tests on the individual orders.    COVID-19,CEPHEID,COR/ALFIE/PAD IN-HOUSE(OR EMERGENT/ADD-ON),NP SWAB IN TRANSPORT MEDIA 3-4 HR TAT, RT-PCR - Swab, Nasopharynx [[id number]]  (Normal) Collected: 04/01/21 2022    Lab Status: Final result Specimen: Swab from Nasopharynx Updated: 04/01/21 2217     COVID19 Not Detected    Narrative:      Fact sheet for providers: https://www.fda.gov/media/182088/download     Fact sheet for patients: https://www.fda.gov/media/298288/download    Blood Culture - Blood, Arm, Left [[id number]]  (Abnormal) Collected: 04/01/21 1725    Lab Status: Preliminary result Specimen: Blood from Arm, Left Updated: 04/02/21 1302     Blood Culture Abnormal Stain     Gram Stain Anaerobic Bottle Gram positive cocci in clusters      Aerobic Bottle Gram positive cocci in clusters    Blood  Culture ID, PCR - Blood, Arm, Left [[id number]]  (Abnormal) Collected: 04/01/21 1725    Lab Status: Final result Specimen: Blood from Arm, Left Updated: 04/02/21 1303     BCID, PCR Staphylococcus spp, not aureus. Identification by BCID PCR.     BOTTLE TYPE Anaerobic Bottle    Blood Culture - Blood, Arm, Right [[id number]] Collected: 04/01/21 1715    Lab Status: Preliminary result Specimen: Blood from Arm, Right Updated: 04/02/21 1745     Blood Culture No growth at 24 hours    Wound Culture - Wound, Coccyx [[id number]]  (Abnormal) Collected: 04/01/21 1420    Lab Status: Preliminary result Specimen: Wound from Coccyx Updated: 04/02/21 1015     Wound Culture Light growth (2+) Enterococcus species      Scant growth (1+) Staphylococcus aureus     Gram Stain No organisms seen          Laboratory  Results from last 7 days   Lab Units 04/02/21  0338   WBC 10*3/mm3 12.00*   HEMOGLOBIN g/dL 11.6*   HEMATOCRIT % 36.3*   PLATELETS 10*3/mm3 497*     Results from last 7 days   Lab Units 04/02/21  0338   SODIUM mmol/L 138   POTASSIUM mmol/L 4.0   CHLORIDE mmol/L 102   CO2 mmol/L 24.0   BUN mg/dL 23   CREATININE mg/dL 0.79   GLUCOSE mg/dL 99   CALCIUM mg/dL 8.4*     Results from last 7 days   Lab Units 04/02/21  0338   SODIUM mmol/L 138   POTASSIUM mmol/L 4.0   CHLORIDE mmol/L 102   CO2 mmol/L 24.0   BUN mg/dL 23   CREATININE mg/dL 0.79   GLUCOSE mg/dL 99   CALCIUM mg/dL 8.4*         Results from last 7 days   Lab Units 04/01/21  1725   SED RATE mm/hr >130*           Radiology  Imaging Results (Last 72 Hours)     Procedure Component Value Units Date/Time    MRI Pelvis With & Without Contrast [[id number]] Resulted: 04/02/21 1704     Updated: 04/02/21 1722    XR Chest 1 View [[id number]] Collected: 04/01/21 1756     Updated: 04/01/21 1801    Narrative:      Examination: XR CHEST 1 VW-     Date of Exam: 4/1/2021 5:45 PM     Indication: new admit.       Comparison: None available.     Technique: 1 view of the chest      FINDINGS:  The heart  overlaps the left lower chest but the density of the left  lower chest suggests that there is underlying airspace opacification.  The right lung is clear. No pleural effusion or pneumothorax. No  significant bone abnormality.       Impression:      1. There is a poorly defined increased density in the left lower chest,  which may be due to a large area of airspace consolidation from  pneumonia or atelectasis. A lung mass is not excluded on this exam.  There are no priors available. Recommend follow-up with PA and lateral  views of the chest or CT chest.     Electronically Signed By-Jane Dorado MD On:4/1/2021 5:59 PM  This report was finalized on [id number] by  Jane Dorado MD.          Cardiology      Results Review:  I have reviewed all clinical data, test, lab, and imaging results.       Schedule Meds  amLODIPine, 10 mg, Oral, Daily  cefepime, 2 g, Intravenous, Q8H  enoxaparin, 40 mg, Subcutaneous, Q24H  gabapentin, 600 mg, Oral, Q12H  guaiFENesin, 1,200 mg, Oral, Q12H  pantoprazole, 40 mg, Oral, QAM AC  sertraline, 50 mg, Oral, Nightly  sodium chloride, 10 mL, Intravenous, Q12H  sodium chloride, 3 mL, Intravenous, Q12H  vancomycin, 1,250 mg, Intravenous, Q24H        Infusion Meds  lactated ringers, 75 mL/hr  Pharmacy to Dose enoxaparin (LOVENOX),   Pharmacy to dose vancomycin,         PRN Meds  •  acetaminophen **OR** acetaminophen **OR** acetaminophen  •  hydrALAZINE  •  HYDROcodone-acetaminophen **OR** HYDROcodone-acetaminophen  •  magnesium sulfate **OR** magnesium sulfate in D5W 1g/100mL (PREMIX)  •  ondansetron **OR** ondansetron  •  Pharmacy to Dose enoxaparin (LOVENOX)  •  Pharmacy to dose vancomycin  •  potassium chloride  •  sodium chloride  •  sodium chloride      Assessment/Plan       Assessment    Sacral decubitus ulceration.  S/p surgical debridement.  Based on not images the wound appears to be at least stage III may be stage IV.  Current cultures are pending  The patient is s/p surgical  debridement on April 4, 2021  MRI of the pelvis just done and no report available    The patient is bedridden for 10 years secondary to multiple sclerosis    Chronic indwelling Matos catheter.  Catheter had been replaced a week ago    Plan    Continue IV vancomycin for now  Continue IV cefepime 2 g every 8 hours and monitor patient for any signs of encephalopathy  Waiting on culture results  If there is signs of bone involvement/osteomyelitis then consider 6 weeks of IV antibiotic  A.m. labs including sed rate  Case was discussed with the patient's wife at the bedside    Marck Hurley MD  04/02/21  18:05 EDT      Note is dictated utilizing voice recognition software/Dragon

## 2021-04-02 NOTE — PLAN OF CARE
Goal Outcome Evaluation:  Plan of Care Reviewed With: patient, spouse  Progress: no change   Pt has been resting comfortably with no complaints. Wife at bedside. NPO for I&D of sacral wound later today. Will continue to monitor...

## 2021-04-02 NOTE — NURSING NOTE
80-year-old male presents to the hospital with complaints of a sacral pressure injury.  Chart reviewed and photos on chart are noted.  Patient is current with outpatient wound care.  Patient undergoing surgery and is in surgery this morning for debridement of the sacral wound.  Will implement pressure injury prevention strategies and defer to surgery at this time and follow for any needs post op

## 2021-04-02 NOTE — OP NOTE
DEBRIDEMENT OF ISCHEAL ULCER/BUTTOCKS WOUND  Operative Note    Patient Name:  Vicente Singh  YOB: 1940    Date of Surgery:  4/2/2021     Indications: Patient is a 80-year-old gentleman with a unstageable sacral decubitus ulcer.  It appeared to have infection, and we discussed surgical debridement to aid with wound healing.  The risk, benefits, and alternatives of surgery were discussed with the patient prior to proceeding the operating room.    Pre-op Diagnosis:   Pressure injury of sacral region, unstageable (CMS/[facility]) [L89.150]    Post-op Diagnosis:  Post-Op Diagnosis Codes:     * Pressure injury of sacral region, unstageable (CMS/[facility]) [L89.150]    Procedure/CPT® Codes:      Procedure(s):  DEBRIDEMENT OF ISCHEAL ULCER/BUTTOCKS WOUND    Staff:  Surgeon(s):  Abiodun Bonnre MD    Anesthesia: General    Estimated Blood Loss: 15 mL    Implants:    Nothing was implanted during the procedure    Specimen:          None    Findings: Soupy black/gray eschar overlying the sacrum    Complications: None, immediately    Description of Procedure: After obtaining informed consent in the preop holding area the patient was brought the operating room placed in supine position.  SCDs were applied, and preoperative antibiotics were administered.  The patient then underwent uncomplicated induction of general endotracheal anesthesia.  He was then transferred to the left lateral decubitus position.  The patient's sacrum was then prepped and draped in the usual sterile fashion and after a brief timeout the procedure began.  I began by sharply excising the gray/black eschar which was overlying the wound using scissors and removing this.  This exposed some gray soupy appearing tissue which was overlying the sacrum.  Then using a rongeur I was able to sharply debride this gray tissue off of the sacrum.  This went all the way down to the periosteum.  I returned to the wound edges, and debrided the skin and  subcutaneous fat down to the level of the periosteum and muscle fascia of the gluteal muscles circumferentially.  What was left at the conclusion of my debridement using the rongeur was healthy, pink appearing tissue.  The dimensions of the wound at the conclusion of the procedure were 5.5 x 4 x 2.5 cm.  The wound was then packed using Betadine soaked Kerlix, fluffs, ABD, and tape.  The patient tolerated the procedure well, was awoken, and taken to PACU in satisfactory condition.    Abiodun Bonner MD     Date: 4/2/2021  Time: 08:50 EDT

## 2021-04-03 LAB
ALBUMIN SERPL-MCNC: 2.9 G/DL (ref 3.5–5.2)
ALBUMIN/GLOB SERPL: 0.6 G/DL
ALP SERPL-CCNC: 56 U/L (ref 39–117)
ALT SERPL W P-5'-P-CCNC: 23 U/L (ref 1–41)
ANION GAP SERPL CALCULATED.3IONS-SCNC: 9 MMOL/L (ref 5–15)
ANISOCYTOSIS BLD QL: NORMAL
AST SERPL-CCNC: 24 U/L (ref 1–40)
BACTERIA SPEC AEROBE CULT: ABNORMAL
BASOPHILS # BLD AUTO: 0.1 10*3/MM3 (ref 0–0.2)
BASOPHILS NFR BLD AUTO: 0.5 % (ref 0–1.5)
BILIRUB SERPL-MCNC: 0.2 MG/DL (ref 0–1.2)
BUN SERPL-MCNC: 25 MG/DL (ref 8–23)
BUN/CREAT SERPL: 31.6 (ref 7–25)
CALCIUM SPEC-SCNC: 8.3 MG/DL (ref 8.6–10.5)
CHLORIDE SERPL-SCNC: 104 MMOL/L (ref 98–107)
CO2 SERPL-SCNC: 24 MMOL/L (ref 22–29)
CREAT SERPL-MCNC: 0.79 MG/DL (ref 0.76–1.27)
DEPRECATED RDW RBC AUTO: 43.8 FL (ref 37–54)
EOSINOPHIL # BLD AUTO: 0 10*3/MM3 (ref 0–0.4)
EOSINOPHIL NFR BLD AUTO: 0.1 % (ref 0.3–6.2)
ERYTHROCYTE [DISTWIDTH] IN BLOOD BY AUTOMATED COUNT: 16.2 % (ref 12.3–15.4)
ERYTHROCYTE [SEDIMENTATION RATE] IN BLOOD: 120 MM/HR (ref 0–20)
GFR SERPL CREATININE-BSD FRML MDRD: 95 ML/MIN/1.73
GLOBULIN UR ELPH-MCNC: 4.6 GM/DL
GLUCOSE SERPL-MCNC: 123 MG/DL (ref 65–99)
GRAM STN SPEC: ABNORMAL
GRAM STN SPEC: ABNORMAL
HCT VFR BLD AUTO: 37.7 % (ref 37.5–51)
HGB BLD-MCNC: 12.1 G/DL (ref 13–17.7)
LARGE PLATELETS: NORMAL
LYMPHOCYTES # BLD AUTO: 1.1 10*3/MM3 (ref 0.7–3.1)
LYMPHOCYTES NFR BLD AUTO: 9.7 % (ref 19.6–45.3)
MAGNESIUM SERPL-MCNC: 2.1 MG/DL (ref 1.6–2.4)
MCH RBC QN AUTO: 24.7 PG (ref 26.6–33)
MCHC RBC AUTO-ENTMCNC: 32.1 G/DL (ref 31.5–35.7)
MCV RBC AUTO: 76.9 FL (ref 79–97)
MONOCYTES # BLD AUTO: 0.9 10*3/MM3 (ref 0.1–0.9)
MONOCYTES NFR BLD AUTO: 8.2 % (ref 5–12)
NEUTROPHILS NFR BLD AUTO: 81.5 % (ref 42.7–76)
NEUTROPHILS NFR BLD AUTO: 9.3 10*3/MM3 (ref 1.7–7)
NRBC BLD AUTO-RTO: 0.1 /100 WBC (ref 0–0.2)
PLATELET # BLD AUTO: 497 10*3/MM3 (ref 140–450)
PMV BLD AUTO: 7.6 FL (ref 6–12)
POTASSIUM SERPL-SCNC: 4.8 MMOL/L (ref 3.5–5.2)
PROT SERPL-MCNC: 7.5 G/DL (ref 6–8.5)
RBC # BLD AUTO: 4.9 10*6/MM3 (ref 4.14–5.8)
SODIUM SERPL-SCNC: 137 MMOL/L (ref 136–145)
VANCOMYCIN PEAK SERPL-MCNC: 46.1 MCG/ML (ref 20–40)
WBC # BLD AUTO: 11.5 10*3/MM3 (ref 3.4–10.8)
WBC MORPH BLD: NORMAL

## 2021-04-03 PROCEDURE — 99231 SBSQ HOSP IP/OBS SF/LOW 25: CPT | Performed by: SURGERY

## 2021-04-03 PROCEDURE — 85025 COMPLETE CBC W/AUTO DIFF WBC: CPT | Performed by: INTERNAL MEDICINE

## 2021-04-03 PROCEDURE — 85007 BL SMEAR W/DIFF WBC COUNT: CPT | Performed by: INTERNAL MEDICINE

## 2021-04-03 PROCEDURE — 99232 SBSQ HOSP IP/OBS MODERATE 35: CPT | Performed by: FAMILY MEDICINE

## 2021-04-03 PROCEDURE — 80053 COMPREHEN METABOLIC PANEL: CPT | Performed by: INTERNAL MEDICINE

## 2021-04-03 PROCEDURE — 25010000002 ENOXAPARIN PER 10 MG: Performed by: SURGERY

## 2021-04-03 PROCEDURE — 80202 ASSAY OF VANCOMYCIN: CPT | Performed by: SURGERY

## 2021-04-03 PROCEDURE — 83735 ASSAY OF MAGNESIUM: CPT | Performed by: SURGERY

## 2021-04-03 PROCEDURE — 85652 RBC SED RATE AUTOMATED: CPT | Performed by: INTERNAL MEDICINE

## 2021-04-03 PROCEDURE — 25010000002 VANCOMYCIN 10 G RECONSTITUTED SOLUTION: Performed by: SURGERY

## 2021-04-03 PROCEDURE — 25010000002 CEFEPIME PER 500 MG: Performed by: SURGERY

## 2021-04-03 RX ORDER — SODIUM HYPOCHLORITE 1.25 MG/ML
SOLUTION TOPICAL 2 TIMES DAILY
Status: DISCONTINUED | OUTPATIENT
Start: 2021-04-03 | End: 2021-04-05 | Stop reason: HOSPADM

## 2021-04-03 RX ORDER — ARGININE/GLUTAMINE/CALCIUM BMB 7G-7G-1.5G
1 POWDER IN PACKET (EA) ORAL 2 TIMES DAILY
Status: DISCONTINUED | OUTPATIENT
Start: 2021-04-03 | End: 2021-04-05 | Stop reason: HOSPADM

## 2021-04-03 RX ADMIN — SERTRALINE 50 MG: 50 TABLET, FILM COATED ORAL at 21:00

## 2021-04-03 RX ADMIN — VANCOMYCIN HYDROCHLORIDE 1250 MG: 10 INJECTION, POWDER, LYOPHILIZED, FOR SOLUTION INTRAVENOUS at 21:00

## 2021-04-03 RX ADMIN — CEFEPIME HYDROCHLORIDE 2 G: 2 INJECTION, POWDER, FOR SOLUTION INTRAVENOUS at 13:46

## 2021-04-03 RX ADMIN — CEFEPIME HYDROCHLORIDE 2 G: 2 INJECTION, POWDER, FOR SOLUTION INTRAVENOUS at 05:59

## 2021-04-03 RX ADMIN — GABAPENTIN 600 MG: 300 CAPSULE ORAL at 08:31

## 2021-04-03 RX ADMIN — ENOXAPARIN SODIUM 40 MG: 40 INJECTION SUBCUTANEOUS at 15:38

## 2021-04-03 RX ADMIN — CEFEPIME HYDROCHLORIDE 2 G: 2 INJECTION, POWDER, FOR SOLUTION INTRAVENOUS at 21:00

## 2021-04-03 RX ADMIN — GUAIFENESIN 1200 MG: 600 TABLET, EXTENDED RELEASE ORAL at 08:32

## 2021-04-03 RX ADMIN — Medication 3 ML: at 08:33

## 2021-04-03 RX ADMIN — Medication 10 ML: at 21:01

## 2021-04-03 RX ADMIN — Medication 10 ML: at 08:33

## 2021-04-03 RX ADMIN — GABAPENTIN 600 MG: 300 CAPSULE ORAL at 21:00

## 2021-04-03 RX ADMIN — GUAIFENESIN 1200 MG: 600 TABLET, EXTENDED RELEASE ORAL at 21:00

## 2021-04-03 RX ADMIN — AMLODIPINE BESYLATE 10 MG: 5 TABLET ORAL at 08:32

## 2021-04-03 RX ADMIN — Medication 1 PACKET: at 13:46

## 2021-04-03 RX ADMIN — DAKIN'S SOLUTION 0.125% (QUARTER STRENGTH): 0.12 SOLUTION at 20:30

## 2021-04-03 RX ADMIN — SODIUM CHLORIDE, POTASSIUM CHLORIDE, SODIUM LACTATE AND CALCIUM CHLORIDE 50 ML/HR: 600; 310; 30; 20 INJECTION, SOLUTION INTRAVENOUS at 15:42

## 2021-04-03 RX ADMIN — Medication 1 PACKET: at 21:01

## 2021-04-03 RX ADMIN — PANTOPRAZOLE SODIUM 40 MG: 40 TABLET, DELAYED RELEASE ORAL at 08:32

## 2021-04-03 NOTE — PROGRESS NOTES
General Surgery Progress Note    Name: Vicente Singh ADMIT: 2021   : 1940  PCP: Leland Santo MD    MRN: [id number] LOS: 2 days   AGE/SEX: 80 y.o. male  ROOM: [address] [place] [address]    No chief complaint on file.    Subjective     80 y.o. male status post debridement of decubitus ulcer on     No major events kind of sore at his debridement site tolerating some diet having bowel movements    Objective        Scheduled Medications:   amLODIPine, 10 mg, Oral, Daily  cefepime, 2 g, Intravenous, Q8H  enoxaparin, 40 mg, Subcutaneous, Q24H  gabapentin, 600 mg, Oral, Q12H  guaiFENesin, 1,200 mg, Oral, Q12H  Tunde, 1 packet, Oral, BID  pantoprazole, 40 mg, Oral, QAM AC  sertraline, 50 mg, Oral, Nightly  sodium chloride, 10 mL, Intravenous, Q12H  sodium chloride, 3 mL, Intravenous, Q12H  sodium hypochlorite, , Topical, BID  vancomycin, 1,250 mg, Intravenous, Q24H        Active Infusions:  lactated ringers, 50 mL/hr, Last Rate: 50 mL/hr (21 1542)  Pharmacy to Dose enoxaparin (LOVENOX),   Pharmacy to dose vancomycin,         As Needed Medications:  •  acetaminophen **OR** acetaminophen **OR** acetaminophen  •  hydrALAZINE  •  HYDROcodone-acetaminophen **OR** HYDROcodone-acetaminophen  •  magnesium sulfate **OR** magnesium sulfate in D5W 1g/100mL (PREMIX)  •  ondansetron **OR** ondansetron  •  Pharmacy to Dose enoxaparin (LOVENOX)  •  Pharmacy to dose vancomycin  •  potassium chloride  •  sodium chloride  •  sodium chloride    Vital Signs  Vital Signs (range)  Temp:  [97.4 °F (36.3 °C)-98.1 °F (36.7 °C)] 98 °F (36.7 °C)  Heart Rate:  [69-80] 73  Resp:  [16-20] 20  BP: (104-165)/() 115/65    Physical Exam:  Physical Exam  Constitutional:       General: He is not in acute distress.  Pulmonary:      Effort: Pulmonary effort is normal. No respiratory distress.   Genitourinary:     Comments: The sacral decubitus ulcers excision site seems appropriate given its postop day 1 status.  Some fibrinous  exudate remains at the superior aspect of the deep aspects of the debridement.  Minimal bleeding from the left lateral skin edge  Musculoskeletal:      Comments: Contractures noted   Skin:     General: Skin is warm and dry.   Neurological:      Mental Status: He is alert.         Results Review:     CBC    Results from last 7 days   Lab Units 04/03/21  0346 04/02/21  0338 04/01/21  1725   WBC 10*3/mm3 11.50* 12.00* 12.60*   HEMOGLOBIN g/dL 12.1* 11.6* 13.9   PLATELETS 10*3/mm3 497* 497* 508*     BMP   Results from last 7 days   Lab Units 04/03/21  0346 04/02/21  0338 04/01/21  1725   SODIUM mmol/L 137 138 142   POTASSIUM mmol/L 4.8 4.0 4.4   CHLORIDE mmol/L 104 102 104   CO2 mmol/L 24.0 24.0 26.0   BUN mg/dL 25* 23 26*   CREATININE mg/dL 0.79 0.79 0.96   GLUCOSE mg/dL 123* 99 145*   MAGNESIUM mg/dL 2.1 1.9  --        I reviewed the patient's new clinical results.    Assessment/Plan       Sacral pressure ulcer    Benign essential hypertension    Hyperlipidemia    Multiple sclerosis (CMS/HCC)    Type 2 diabetes mellitus (CMS/HCC)    GERD without esophagitis    History of CVA (cerebrovascular accident)    Neurogenic bladder    Mild malnutrition (CMS/HCC)    Bowel incontinence    Pressure injury of sacral region, unstageable (CMS/HCC)    Dysphagia      80 y.o. male status post debridement of sacral decubitus ulcer on April 2    We will start quarter strength Dakin's dressing changes twice daily      This note was created using Dragon Voice Recognition software.    Wicho Santo MD  04/03/21  17:55 EDT

## 2021-04-03 NOTE — PLAN OF CARE
Goal Outcome Evaluation:        Outcome Summary: Patient did well during the night.  Family still at bedside.  Will continue to monitor.

## 2021-04-03 NOTE — PLAN OF CARE
Goal Outcome Evaluation:        Outcome Summary: Patient has IV antibiotics ordered. Patient has chronic lau catheter. Will continue to observe.

## 2021-04-03 NOTE — PROGRESS NOTES
Infectious Diseases Progress Note      LOS: 2 days   Patient Care Team:  Leland Santo MD as PCP - General  Jessenia Pederson RN as Registered Nurse    Chief Complaint: Wound    Subjective       The patient has been afebrile for the last 24 hours.  The patient is on room air, hemodynamically stable, and is tolerating antimicrobial therapy.      Review of Systems:   Review of Systems   Unable to perform ROS: Acuity of condition   Skin: Positive for wound.        Objective     Vital Signs  Temp:  [97.4 °F (36.3 °C)-98.1 °F (36.7 °C)] 98 °F (36.7 °C)  Heart Rate:  [69-80] 73  Resp:  [16-20] 20  BP: (104-165)/() 115/65    Physical Exam:  Physical Exam  Vitals and nursing note reviewed.   Constitutional:       General: He is not in acute distress.     Appearance: He is well-developed and normal weight. He is not diaphoretic.   HENT:      Head: Normocephalic and atraumatic.   Eyes:      Extraocular Movements: Extraocular movements intact.      Conjunctiva/sclera: Conjunctivae normal.      Pupils: Pupils are equal, round, and reactive to light.   Cardiovascular:      Rate and Rhythm: Normal rate and regular rhythm.      Heart sounds: Normal heart sounds, S1 normal and S2 normal.   Pulmonary:      Effort: Pulmonary effort is normal. No respiratory distress.      Breath sounds: Normal breath sounds. No stridor. No wheezing or rales.   Abdominal:      General: Bowel sounds are normal. There is no distension.      Palpations: Abdomen is soft. There is no mass.      Tenderness: There is no abdominal tenderness. There is no guarding.   Genitourinary:     Comments: Matos catheter  Musculoskeletal:         General: No deformity.      Cervical back: Neck supple.      Comments: Contractures of upper extremities   Skin:     General: Skin is warm and dry.      Coloration: Skin is not pale.      Findings: No erythema or rash.      Comments: Sacral wound is currently packed   Neurological:      Mental Status: He is alert.       Cranial Nerves: No cranial nerve deficit.      Comments: Alert and awake          Results Review:    I have reviewed all clinical data, test, lab, and imaging results.     Radiology  MRI Pelvis With & Without Contrast    Result Date: 4/2/2021  DATE OF EXAM: 4/2/2021 16:27 EDT PROCEDURE: MRI PELVIS W WO CONTRAST INDICATIONS: Concern for osteomyelitis of the sacrum/coccyx COMPARISON: No Comparisons Available TECHNIQUE: Routine magnetic resonance imaging was obtained of the pelvis after the administration of 12 ml of ProHance contrast intravenously. FINDINGS: There is a large amount of formed stool in the distal colon and rectum with prominent distention of the rectum. The catheter with decompression of the bladder. There is a left inguinal hernia containing a loop of sigmoid colon. No dilated bowel loops are  visualized. There appears to be a large decubitus ulcer overlying the lower sacrum. Also appears to extend to the posterior S3 and S4 vertebral bodies. There appears to be mild marrow edema of the posterior S4 and S5 vertebral bodies with mild corresponding T1 hypointense signal. There is also mild postcontrast enhancement. Findings appear consistent with osteomyelitis. There also appears to be a soft tissue defect at the posterior left lateral pelvis overlying the posterior left greater trochanter. There is suspicion for a small tract with peripheral enhancement extending from the wound to the posterior cortex of the greater trochanter as seen on axial postcontrast images 32-35. There is minimal subcortical edema and enhancement at the posterior greater trochanter without significant T1 signal abnormality. Osteitis or early osteomyelitis. No other significant marrow signal abnormality is identified. No definite hip effusion. There is edema and enhancement in the soft tissues surrounding the ulcerations including in the bilateral gluteus tabatha muscles which may represent skin and soft tissue infection and  myositis. No significant focal collection is visualized at this time.     1.Decubitus ulcer with extension to the posterior cortex of the lower sacrum where there appears to be abnormal marrow signal consistent with osteomyelitis. 2.Soft tissue wound posterior to the left greater trochanter.  Sinus tract extends to the left greater trochanter where there is a small focus of subcortical edema which could represent osteitis or early osteomyelitis. 3.Soft tissue edema and enhancement surrounding the wounds including in the gluteus tabatha muscles which may indicate skin and soft tissue infection and myositis. No sizable abscess is identified at this time. 4.Large amount of formed stool in the distal colon and rectum. Disimpaction may be necessary. 5.Left inguinal hernia containing a loop of colon. Electronically Signed: Uziel Britton MD 4/2/2021 18:51 EDT      Cardiology    Laboratory    Results from last 7 days   Lab Units 04/03/21  0346 04/02/21  0338 04/01/21  1725   WBC 10*3/mm3 11.50* 12.00* 12.60*   HEMOGLOBIN g/dL 12.1* 11.6* 13.9   HEMATOCRIT % 37.7 36.3* 43.9   PLATELETS 10*3/mm3 497* 497* 508*     Results from last 7 days   Lab Units 04/03/21  0346 04/02/21  0338 04/01/21  1725   SODIUM mmol/L 137 138 142   POTASSIUM mmol/L 4.8 4.0 4.4   CHLORIDE mmol/L 104 102 104   CO2 mmol/L 24.0 24.0 26.0   BUN mg/dL 25* 23 26*   CREATININE mg/dL 0.79 0.79 0.96   GLUCOSE mg/dL 123* 99 145*   ALBUMIN g/dL 2.90* 2.90* 3.60   BILIRUBIN mg/dL 0.2 0.2 0.3   ALK PHOS U/L 56 59 69   AST (SGOT) U/L 24 20 21   ALT (SGPT) U/L 23 24 30   CALCIUM mg/dL 8.3* 8.4* 9.0         Results from last 7 days   Lab Units 04/03/21  0346   SED RATE mm/hr 120*         Microbiology   Microbiology Results (last 10 days)     Procedure Component Value - Date/Time    MRSA Screen, PCR (Inpatient) - Swab, Nares [[id number]]  (Normal) Collected: 04/01/21 2200    Lab Status: Final result Specimen: Swab from Nares Updated: 04/02/21 0056     MRSA PCR No MRSA  Detected    COVID PRE-OP / PRE-PROCEDURE SCREENING ORDER (NO ISOLATION) - Swab, Nasopharynx [[id number]]  (Normal) Collected: 04/01/21 2022    Lab Status: Final result Specimen: Swab from Nasopharynx Updated: 04/01/21 2217    Narrative:      The following orders were created for panel order COVID PRE-OP / PRE-PROCEDURE SCREENING ORDER (NO ISOLATION) - Swab, Nasopharynx.  Procedure                               Abnormality         Status                     ---------                               -----------         ------                     COVID-19,CEPHEID,COR/ALFIE...[[id number]]  Normal              Final result                 Please view results for these tests on the individual orders.    COVID-19,CEPHEID,COR/ALFIE/PAD IN-HOUSE(OR EMERGENT/ADD-ON),NP SWAB IN TRANSPORT MEDIA 3-4 HR TAT, RT-PCR - Swab, Nasopharynx [[id number]]  (Normal) Collected: 04/01/21 2022    Lab Status: Final result Specimen: Swab from Nasopharynx Updated: 04/01/21 2217     COVID19 Not Detected    Narrative:      Fact sheet for providers: https://www.fda.gov/media/235074/download     Fact sheet for patients: https://www.fda.gov/media/681799/download    Blood Culture - Blood, Arm, Left [[id number]]  (Abnormal) Collected: 04/01/21 1725    Lab Status: Final result Specimen: Blood from Arm, Left Updated: 04/03/21 0811     Blood Culture Staphylococcus, coagulase negative     Comment: Probable contaminant requires clinical correlation, susceptibility not performed unless requested by physician.          Gram Stain Anaerobic Bottle Gram positive cocci in clusters      Aerobic Bottle Gram positive cocci in clusters    Blood Culture ID, PCR - Blood, Arm, Left [[id number]]  (Abnormal) Collected: 04/01/21 1725    Lab Status: Final result Specimen: Blood from Arm, Left Updated: 04/02/21 1303     BCID, PCR Staphylococcus spp, not aureus. Identification by BCID PCR.     BOTTLE TYPE Anaerobic Bottle    Blood Culture - Blood, Arm, Right [[id number]] Collected:  04/01/21 1715    Lab Status: Preliminary result Specimen: Blood from Arm, Right Updated: 04/02/21 1745     Blood Culture No growth at 24 hours    Wound Culture - Wound, Coccyx [[id number]]  (Abnormal) Collected: 04/01/21 1420    Lab Status: Preliminary result Specimen: Wound from Coccyx Updated: 04/03/21 0955     Wound Culture Light growth (2+) Enterococcus species      Moderate growth (3+) Staphylococcus aureus      Moderate growth (3+) Normal Skin Leatha     Gram Stain No organisms seen          Medication Review:       Schedule Meds  amLODIPine, 10 mg, Oral, Daily  cefepime, 2 g, Intravenous, Q8H  enoxaparin, 40 mg, Subcutaneous, Q24H  gabapentin, 600 mg, Oral, Q12H  guaiFENesin, 1,200 mg, Oral, Q12H  Tunde, 1 packet, Oral, BID  pantoprazole, 40 mg, Oral, QAM AC  sertraline, 50 mg, Oral, Nightly  sodium chloride, 10 mL, Intravenous, Q12H  sodium chloride, 3 mL, Intravenous, Q12H  vancomycin, 1,250 mg, Intravenous, Q24H        Infusion Meds  lactated ringers, 50 mL/hr, Last Rate: 50 mL/hr (04/03/21 1542)  Pharmacy to Dose enoxaparin (LOVENOX),   Pharmacy to dose vancomycin,         PRN Meds  •  acetaminophen **OR** acetaminophen **OR** acetaminophen  •  hydrALAZINE  •  HYDROcodone-acetaminophen **OR** HYDROcodone-acetaminophen  •  magnesium sulfate **OR** magnesium sulfate in D5W 1g/100mL (PREMIX)  •  ondansetron **OR** ondansetron  •  Pharmacy to Dose enoxaparin (LOVENOX)  •  Pharmacy to dose vancomycin  •  potassium chloride  •  sodium chloride  •  sodium chloride        Assessment/Plan       Antimicrobial Therapy   1.  Vancomycin      day  2.  Cefepime      day  3.      Day  4.      Day  5.      Day      Assessment     Sacral decubitus ulceration.  S/p surgical debridement.  Based on not images the wound appears to be at least stage III may be stage IV.  Current cultures are pending  The patient is s/p surgical debridement on April 4, 2021  MRI of the pelvis show osteomyelitis  -Cultures are growing Enterococcus  and Staph aureus so far     The patient is bedridden for 10 years secondary to multiple sclerosis     Chronic indwelling Matos catheter.  Catheter had been replaced a week ago    1 out of 2 blood cultures is growing coagulase-negative Staphylococcus-this is likely contamination     Plan     Continue IV vancomycin for now  Continue IV cefepime 2 g every 8 hours and monitor patient for any signs of encephalopathy  Waiting on culture results  Patient will need 6 weeks of IV antibiotics  Continue supportive care  A.m. labs including sed rate  Case was discussed with the patient's wife at the bedside       JACINDA Bass  04/03/21  16:49 EDT

## 2021-04-03 NOTE — PROGRESS NOTES
[facility] Medicine Services Daily Progress Note      Hospitalist Team  LOS 2 days      Patient Care Team:  Leland Santo MD as PCP - General  Jessenia Pederson RN as Registered Nurse    Patient Location: 367/1      Subjective   Subjective     Chief Complaint / Subjective  No chief complaint on file.    Patient seen by gastroenterology yesterday underwent EGD with dilation.  Possible discussion of Botox in the future by GI.  Patient tolerated procedure well.  Patient also seen by infectious disease, continuing broad-spectrum antibiotics until cultures return.  Today patient much more awake and alert denies any pain or shortness of breath.  Patient meeting with nutrition in order to address malnutrition secondary to progressive MS.    Brief Synopsis of Hospital Course/HPI  Mr. Singh is a 80 y.o. male with a past medical history of hypertension, CVA, hyperlipidemia, MS, and type 2 diabetes mellitus who was directly admitted to [facility] ED by Dr. Graham from [facility] wound care center on 4/1/2021 due to an necrotic sacral wound.  Hospital accepted the patient for further care management.  Per patient's wife this all started in December 2020 when she noticed a pressure ulcer on his sacrum.  She had home health coming out and eventually they decided he would need to go to the wound care center because it started to drain yellow discharge and have a foul odor.  Patient started to go to the wound care center 1 month ago and was supposed to follow-up last Thursday, however had a fever so he did not follow-up at that time.  Patient's wife was instructed to bring patient to the ED if his fever got above 101.0.  She ended up bringing him to the wound care center today for follow-up.  She reports he has had multiple pressure ulcers in the past that were just red spots, but would eventually heal.  She explains he has a neurogenic bladder and has a chronic Matos catheter.  She  "also explains that he has bowel incontinence and she feeds him his meals due to current contracted arms.  Patient was able to deny he is in pain.  Patient is currently alert and oriented x3. ID and general surgery were consulted upon admission. Patient was started on empiric antibiotics and wound culture was collected at the Wound care center.      4/2/2021: Patient seen postoperatively this morning after wound debridement.  Patient somnolent but arousable.  Patient's wife at bedside, patient with no obvious concerns at this time.  Patient with significant weight loss and debility, wife is reporting that he has had long history of dysphagia associated with his MS and has multiple dilations reported more issues with food as of late wanted to see gastroenterology.  GI to be consulted.    Date::          Review of Systems   Unable to perform ROS: patient nonverbal         Objective   Objective      Vital Signs  Temp:  [97.4 °F (36.3 °C)-98.1 °F (36.7 °C)] 98 °F (36.7 °C)  Heart Rate:  [69-82] 75  Resp:  [15-20] 20  BP: (104-165)/() 165/107  Oxygen Therapy  SpO2: 92 %  Pulse Oximetry Type: Continuous  Device (Oxygen Therapy): room air  Flow (L/min): 3  ETCO2 (mmHg): 30 mmHg  Flowsheet Rows      First Filed Value   Admission Height  180.3 cm (71\") Documented at 04/01/2021 1645   Admission Weight  60.8 kg (134 lb) Documented at 04/01/2021 1645        Intake & Output (last 3 days)       03/31 0701 - 04/01 0700 04/01 0701 - 04/02 0700 04/02 0701 - 04/03 0700 04/03 0701 - 04/04 0700    P.O.  200 240 240    I.V. (mL/kg)   900 (14.2)     Total Intake(mL/kg)  200 (3.2) 1140 (18) 240 (3.8)    Urine (mL/kg/hr)  825 1100 (0.7)     Stool   0     Blood   15     Total Output  825 1115     Net  -625 +25 +240            Stool Unmeasured Occurrence   2 x         Lines, Drains & Airways    Active LDAs     Name:   Placement date:   Placement time:   Site:   Days:    Peripheral IV 04/01/21 2021 Anterior;Right Forearm   04/01/21    " 2021    Forearm   less than 1    Urethral Catheter   04/01/21    1500 unknown placement date and time     less than 1                  Physical Exam:    Physical Exam    General: Frail elderly male lying in bed breathing comfortably on room air no acute distress  HEENT: NC/AT, EOMI, mucosa moist  Heart: Regular, rate controlled  Chest: Normal work of breathing, moving air well no wheezing  Abdominal: Soft. NT/ND.   Musculoskeletal: Diminished range of motion, arm contractures noted bilaterally.  No edema. No calf tenderness.  Neurological: Awake and alert, spasticity noted  Skin: Skin is warm and dry.  Unstageable ulcer present, wound dressing clean dry and intact  Psychiatric: Normal mood and affect.          Wounds (last 24 hours)      LDA Wound     Row Name 04/03/21 0701 04/02/21 1938          Wound 04/02/21 0643 sacral spine    Wound - Properties Group Placement Date: 04/02/21  -KK Placement Time: 0643 -KK Location: sacral spine  -KK Stage, Pressure Injury : unstageable  -KK    Dressing Appearance  --  dry;intact  -DD     Closure  KERRIE  -BS  KERRIE  -DD     Base  --  other (see comments) surgical dressing  -DD     Care, Wound  --  other (see comments) surgical dressing  -DD     Dressing Care  --  other (see comments) surgial dressing  -DD     Retired Wound - Properties Group Date first assessed: 04/02/21 -KK Time first assessed: 0643 -KK Location: sacral spine  -KK       Wound 04/01/21 1700 Right medial gluteal Degloving injury    Wound - Properties Group Placement Date: 04/01/21  -BJ Placement Time: 1700  -BJ Present on Hospital Admission: Y  -BJ Side: Right  -BJ Orientation: medial  -BJ Location: gluteal  -BJ Primary Wound Type: Degloving in  -BJ Stage, Pressure Injury : deep tissue injury  -BJ    Retired Wound - Properties Group Date first assessed: 04/01/21  -BJ Time first assessed: 1700  -BJ Present on Hospital Admission: Y  -BJ Side: Right  -BJ Location: gluteal  -BJ Primary Wound Type: Degloving in  -BJ        Wound 04/02/21 0739 sacral spine Incision    Wound - Properties Group Placement Date: 04/02/21  -AM Placement Time: 0739  -AM Location: sacral spine  -AM Primary Wound Type: Incision  -AM    Closure  KERRIE  -BS  --     Retired Wound - Properties Group Date first assessed: 04/02/21  -AM Time first assessed: 0739  -AM Location: sacral spine  -AM Primary Wound Type: Incision  -AM      User Key  (r) = Recorded By, (t) = Taken By, (c) = Cosigned By    Initials Name Provider Type    AM Frances Sanchez, RN Registered Nurse    Veronica Cee, RN Registered Nurse    Haily Grace RN Registered Nurse    Belia Hdz LPN Licensed Nurse    Jessenia Mathias RN Registered Nurse          Procedures:    Procedure(s):  DEBRIDEMENT OF ISCHEAL ULCER/BUTTOCKS WOUND          Results Review:     I reviewed the patient's new clinical results.      Lab Results (last 24 hours)     Procedure Component Value Units Date/Time    Blood Culture - Blood, Arm, Left [[id number]]  (Abnormal) Collected: 04/01/21 1725    Specimen: Blood from Arm, Left Updated: 04/03/21 0811     Blood Culture Staphylococcus, coagulase negative     Comment: Probable contaminant requires clinical correlation, susceptibility not performed unless requested by physician.          Gram Stain Anaerobic Bottle Gram positive cocci in clusters      Aerobic Bottle Gram positive cocci in clusters    CBC & Differential [[id number]]  (Abnormal) Collected: 04/03/21 0346    Specimen: Blood Updated: 04/03/21 0501    Narrative:      The following orders were created for panel order CBC & Differential.  Procedure                               Abnormality         Status                     ---------                               -----------         ------                     Scan Slide[[id number]]                                       Final result               CBC Auto Differential[[id number]]        Abnormal            Final result                 Please view  results for these tests on the individual orders.    Scan Slide [[id number]] Collected: 04/03/21 0346    Specimen: Blood Updated: 04/03/21 0501     Anisocytosis Slight/1+     WBC Morphology Normal     Large Platelets Slight/1+    Narrative:      Slide reviewed.  No Plt clumping seen    CBC Auto Differential [[id number]]  (Abnormal) Collected: 04/03/21 0346    Specimen: Blood Updated: 04/03/21 0501     WBC 11.50 10*3/mm3      RBC 4.90 10*6/mm3      Hemoglobin 12.1 g/dL      Hematocrit 37.7 %      MCV 76.9 fL      MCH 24.7 pg      MCHC 32.1 g/dL      RDW 16.2 %      RDW-SD 43.8 fl      MPV 7.6 fL      Platelets 497 10*3/mm3      Neutrophil % 81.5 %      Lymphocyte % 9.7 %      Monocyte % 8.2 %      Eosinophil % 0.1 %      Basophil % 0.5 %      Neutrophils, Absolute 9.30 10*3/mm3      Lymphocytes, Absolute 1.10 10*3/mm3      Monocytes, Absolute 0.90 10*3/mm3      Eosinophils, Absolute 0.00 10*3/mm3      Basophils, Absolute 0.10 10*3/mm3      nRBC 0.1 /100 WBC     Sedimentation Rate [[id number]]  (Abnormal) Collected: 04/03/21 0346    Specimen: Blood Updated: 04/03/21 0445     Sed Rate 120 mm/hr     Comprehensive Metabolic Panel [[id number]]  (Abnormal) Collected: 04/03/21 0346    Specimen: Blood Updated: 04/03/21 0423     Glucose 123 mg/dL      BUN 25 mg/dL      Creatinine 0.79 mg/dL      Sodium 137 mmol/L      Potassium 4.8 mmol/L      Comment: Slight hemolysis detected by analyzer. Results may be affected.        Chloride 104 mmol/L      CO2 24.0 mmol/L      Calcium 8.3 mg/dL      Total Protein 7.5 g/dL      Albumin 2.90 g/dL      ALT (SGPT) 23 U/L      AST (SGOT) 24 U/L      Comment: Slight hemolysis detected by analyzer. Results may be affected.        Alkaline Phosphatase 56 U/L      Total Bilirubin 0.2 mg/dL      eGFR Non African Amer 95 mL/min/1.73      Globulin 4.6 gm/dL      A/G Ratio 0.6 g/dL      BUN/Creatinine Ratio 31.6     Anion Gap 9.0 mmol/L     Narrative:      GFR Normal >60  Chronic Kidney Disease  <60  Kidney Failure <15      Magnesium [[id number]]  (Normal) Collected: 04/03/21 0346    Specimen: Blood Updated: 04/03/21 0422     Magnesium 2.1 mg/dL     Blood Culture - Blood, Arm, Right [[id number]] Collected: 04/01/21 1715    Specimen: Blood from Arm, Right Updated: 04/02/21 1745     Blood Culture No growth at 24 hours        No results found for: HGBA1C            No results found for: LIPASE  No results found for: CHOL, CHLPL, TRIG, HDL, LDL, LDLDIRECT    No results found for: INTRAOP, PREDX, FINALDX, COMDX    Microbiology Results (last 10 days)     Procedure Component Value - Date/Time    MRSA Screen, PCR (Inpatient) - Swab, Nares [[id number]]  (Normal) Collected: 04/01/21 2200    Lab Status: Final result Specimen: Swab from Nares Updated: 04/02/21 0056     MRSA PCR No MRSA Detected    COVID PRE-OP / PRE-PROCEDURE SCREENING ORDER (NO ISOLATION) - Swab, Nasopharynx [[id number]]  (Normal) Collected: 04/01/21 2022    Lab Status: Final result Specimen: Swab from Nasopharynx Updated: 04/01/21 2217    Narrative:      The following orders were created for panel order COVID PRE-OP / PRE-PROCEDURE SCREENING ORDER (NO ISOLATION) - Swab, Nasopharynx.  Procedure                               Abnormality         Status                     ---------                               -----------         ------                     COVID-19,CEPHEID,COR/ALFIE...[[id number]]  Normal              Final result                 Please view results for these tests on the individual orders.    COVID-19,CEPHEID,COR/ALFIE/PAD IN-HOUSE(OR EMERGENT/ADD-ON),NP SWAB IN TRANSPORT MEDIA 3-4 HR TAT, RT-PCR - Swab, Nasopharynx [[id number]]  (Normal) Collected: 04/01/21 2022    Lab Status: Final result Specimen: Swab from Nasopharynx Updated: 04/01/21 2217     COVID19 Not Detected    Narrative:      Fact sheet for providers: https://www.fda.gov/media/056566/download     Fact sheet for patients: https://www.fda.gov/media/779357/download    Blood Culture -  Blood, Arm, Left [[id number]]  (Abnormal) Collected: 04/01/21 1725    Lab Status: Final result Specimen: Blood from Arm, Left Updated: 04/03/21 0811     Blood Culture Staphylococcus, coagulase negative     Comment: Probable contaminant requires clinical correlation, susceptibility not performed unless requested by physician.          Gram Stain Anaerobic Bottle Gram positive cocci in clusters      Aerobic Bottle Gram positive cocci in clusters    Blood Culture ID, PCR - Blood, Arm, Left [[id number]]  (Abnormal) Collected: 04/01/21 1725    Lab Status: Final result Specimen: Blood from Arm, Left Updated: 04/02/21 1303     BCID, PCR Staphylococcus spp, not aureus. Identification by BCID PCR.     BOTTLE TYPE Anaerobic Bottle    Blood Culture - Blood, Arm, Right [[id number]] Collected: 04/01/21 1715    Lab Status: Preliminary result Specimen: Blood from Arm, Right Updated: 04/02/21 1745     Blood Culture No growth at 24 hours    Wound Culture - Wound, Coccyx [[id number]]  (Abnormal) Collected: 04/01/21 1420    Lab Status: Preliminary result Specimen: Wound from Coccyx Updated: 04/03/21 0955     Wound Culture Light growth (2+) Enterococcus species      Moderate growth (3+) Staphylococcus aureus      Moderate growth (3+) Normal Skin Leatha     Gram Stain No organisms seen          ECG/EMG Results (most recent)     None                  MRI Pelvis With & Without Contrast    Result Date: 4/2/2021  1.Decubitus ulcer with extension to the posterior cortex of the lower sacrum where there appears to be abnormal marrow signal consistent with osteomyelitis. 2.Soft tissue wound posterior to the left greater trochanter.  Sinus tract extends to the left greater trochanter where there is a small focus of subcortical edema which could represent osteitis or early osteomyelitis. 3.Soft tissue edema and enhancement surrounding the wounds including in the gluteus tabatha muscles which may indicate skin and soft tissue infection and myositis. No  sizable abscess is identified at this time. 4.Large amount of formed stool in the distal colon and rectum. Disimpaction may be necessary. 5.Left inguinal hernia containing a loop of colon. Electronically Signed: Uziel Britton MD 4/2/2021 18:51 EDT    XR Chest 1 View    Result Date: 4/1/2021  1. There is a poorly defined increased density in the left lower chest, which may be due to a large area of airspace consolidation from pneumonia or atelectasis. A lung mass is not excluded on this exam. There are no priors available. Recommend follow-up with PA and lateral views of the chest or CT chest.  Electronically Signed By-Jane Dorado MD On:4/1/2021 5:59 PM This report was finalized on [id number] by  Jane Dorado MD.          Xrays, labs reviewed personally by physician.    Medication Review:   I have reviewed the patient's current medication list      Scheduled Meds  amLODIPine, 10 mg, Oral, Daily  cefepime, 2 g, Intravenous, Q8H  enoxaparin, 40 mg, Subcutaneous, Q24H  gabapentin, 600 mg, Oral, Q12H  guaiFENesin, 1,200 mg, Oral, Q12H  Tunde, 1 packet, Oral, BID  pantoprazole, 40 mg, Oral, QAM AC  sertraline, 50 mg, Oral, Nightly  sodium chloride, 10 mL, Intravenous, Q12H  sodium chloride, 3 mL, Intravenous, Q12H  vancomycin, 1,250 mg, Intravenous, Q24H        Meds Infusions  lactated ringers, 75 mL/hr, Last Rate: 75 mL/hr (04/02/21 1315)  Pharmacy to Dose enoxaparin (LOVENOX),   Pharmacy to dose vancomycin,         Meds PRN  •  acetaminophen **OR** acetaminophen **OR** acetaminophen  •  hydrALAZINE  •  HYDROcodone-acetaminophen **OR** HYDROcodone-acetaminophen  •  magnesium sulfate **OR** magnesium sulfate in D5W 1g/100mL (PREMIX)  •  ondansetron **OR** ondansetron  •  Pharmacy to Dose enoxaparin (LOVENOX)  •  Pharmacy to dose vancomycin  •  potassium chloride  •  sodium chloride  •  sodium chloride        Assessment/Plan   Assessment/Plan     Active Hospital Problems    Diagnosis  POA   • **Sacral pressure ulcer  [L89.159]  Yes   • Benign essential hypertension [I10]  Yes   • Hyperlipidemia [E78.5]  Yes   • Multiple sclerosis (CMS/HCC) [G35]  Yes   • Type 2 diabetes mellitus (CMS/HCC) [E11.9]  Yes   • GERD without esophagitis [K21.9]  Yes   • History of CVA (cerebrovascular accident) [Z86.73]  Not Applicable   • Neurogenic bladder [N31.9]  Yes   • Mild malnutrition (CMS/HCC) [E44.1]  Yes   • Bowel incontinence [R15.9]  Yes   • Pressure injury of sacral region, unstageable (CMS/HCC) [L89.150]  Unknown   • Dysphagia [R13.10]  Unknown      Resolved Hospital Problems   No resolved problems to display.       MEDICAL DECISION MAKING COMPLEXITY BY PROBLEM:     Infected pressure ulcer-patient appearing debilitated with significant sequelae from multiple sclerosis likely to have poor healing, also with poor nutrition.  Patient with surrounding cellulitic changes  -Wound care  -Nutrition consult  -IV antibiotics with Zosyn initially but wound growing staph aureus switch to vancomycin cefepime  -ID consult, appreciate recommendation  -MRI of pelvis showing possible osteomyelitis near left greater trochanter  -General surgery consulted  -Inflammatory markers significantly elevated  -Check A1c  -Blood cultures  -Culture wound, growing Enterococcus and staph aureus unspecified if MRSA at this time ID switched antibiotics to vancomycin and cefepime  -Daily CBC     Leukocytosis-due to underlying infected ulcer  -Continue antibiotics  -Check daily    Thrombocytosis-likely reactive due to underlying infection, normalizing  -Monitor daily    Multiple sclerosis-with significant sequelae including multiple contractures chronic debility and signs of wasting  -Continue home medications  -PT/OT  -Patient's wife reports patient has issues with his meals, has copious thick secretions with eating and inhibits his ability to tolerate p.o.  -Added Mucinex  -GI consult for esophageal dysmotility issues, status post EGD with dilation on 4/2/2021 doing  well post procedure, follow-up outpatient to discuss Botox in the future    Hypertension/GERD/depression-chronic in nature  -Resume home medication as clinically appropriate    CVA-patient previously reported, on antiplatelets  -Continue    Neurogenic bladder-due to MS  -Continue Matos cath    VTE Prophylaxis -   Mechanical Order History:     None      Pharmalogical Order History:      Ordered     Dose Route Frequency Stop    04/01/21 1739  [MAR Hold]  enoxaparin (LOVENOX) syringe 40 mg     (MAR Hold since Fri 4/2/2021 at 0632.Hold Reason: Unreviewed Transfer Orders.)    40 mg SC Every 24 Hours Scheduled --    04/01/21 1734  Pharmacy to Dose enoxaparin (LOVENOX)      -- XX Continuous PRN --                  Code Status -   Code Status and Medical Interventions:   Ordered at: 04/01/21 1709     Limited Support to NOT Include:    Intubation     Level Of Support Discussed With:    Health Care Surrogate     Code Status:    No CPR     Medical Interventions (Level of Support Prior to Arrest):    Limited       This patient has been examined wearing appropriate Personal Protective Equipment and discussed with hospital infection control department. 04/03/21        Discharge Planning  pending        Electronically signed by Fei Bruner MD, 04/03/21, 12:30 EDT.  [facility] Hospitalist Team

## 2021-04-03 NOTE — CONSULTS
Nutrition Services    Patient Name: Vicente Singh  YOB: 1940  MRN: [id number]  Admission date: 4/1/2021    Comments:     Continue soft to chew diet and recommend ST consult/eval if issues with dysphagia continue    Ordering Boost Plus BID (720 kcal & 28 g of protein if consumed) and magic cups at lunch dinner meals (580 kcal & 18 g of protein if consumed)    Ordering Tunde BID to promote wound healing      PPE Documentation        PPE Worn By Provider Surgical mask, eye protection   PPE Worn By Patient  Patient and spouse not wearing PPE     CLINICAL NUTRITION ASSESSMENT      Reason for Assessment 4/3/21: MST=3, PI, CORRY     H&P:      Past Medical History:   Diagnosis Date   • Benign essential hypertension 4/1/2021   • Cerebrovascular accident (CVA) (CMS/[facility]) 2008   • Chronic indwelling Matos catheter    • Hyperlipidemia 4/1/2021   • Multiple sclerosis (CMS/[facility]) 4/1/2021   • Neurogenic bladder    • Sacral pressure sore    • Sleep apnea    • Stool incontinence    • Type 2 diabetes mellitus (CMS/[facility]) 4/1/2021       History reviewed. No pertinent surgical history.     Current Problems:   Admitted from [facility] on 4/1/2021 due to an necrotic sacral wound.      Infected pressure ulcer-patient appearing debilitated with significant sequelae from multiple sclerosis likely to have poor healing, also with poor nutrition.  Patient with surrounding cellulitic changes  -Wound care  -General surgery consulted, s/p debridement on 4/3     Leukocytosis  -due to underlying infected ulcer  -antibx     Thrombocytosis  -likely reactive due to underlying infection       Multiple sclerosis-with significant sequelae including multiple contractures chronic debility and signs of wasting  -Patient's wife reports patient has issues with his meals, has copious thick secretions with eating and inhibits his ability to tolerate p.o.  -Mucinex  -GI consulted, s/p EGD with GEJ dilation  "4/2     Hypertension    GERD    Depression     CVA     Neurogenic bladder  -due to MS        Nutrition/Diet History         Narrative     RD visited patient at bedside, pt is alert and able to communicate some with 1 word answers, however wife is present and able to provide nutrition related history. She reports that they were told at the wound care center his \"nutrition labs were low\". She says his appetite has been good and he typically eats most of what she feeds him. They eat 3 meals daily and will share a large plate together, protein and starch, occasional vegetable. She reported they just started adding ensure high protein daily. RD discussed nutrition needs for wound healing and encouraged adequate protein, kcal, and variety from all food groups. RD asked about swallowing issues, wife also reports that dilation helped significantly & that he did not have any issues with swallowing with 2 meals he has had after this procedure. She reports they were supposed to work with a ST in the past after he had a stroke many years ago but declined. She typically chooses soft foods and cuts his items into bite size pieces, declined need for mechanical soft. If issues with dysphagia continue, recommend ST consult and eval. Patient agreeable to boost plus in between meals as well as magic cup at lunch/dinner. Also discussed Tunde which wife reports they had tried samples of from wound care center, will add BID. Discussed it is only needed for 2-4 weeks to promote wound healing, gave info on where to buy if would like to continue at home.   Functional Status Bedbound x10 years     Food Allergies NKFA     Factors Affecting   Nutritional Intake MS  Unstageable wound     Anthropometrics        Current Height, Weight Height: 180.3 cm (71\")  Weight: 63.4 kg (139 lb 12.4 oz) (04/03/21 0304)        Admit Height, Weight     Flowsheet Rows      First Filed Value   Admission Height  180.3 cm (71\") Documented at 04/01/2021 1645 " "  Admission Weight  60.8 kg (134 lb) Documented at 04/01/2021 1645             Ideal Body Weight (IBW) 172 lb - question applicability with advanced MS   % Ideal Body Weight 81%       Usual Body Weight UTD   % Usual Body Weight UTD   Wt Change Observation No wt hx PTA   Weight Hx    Wt Readings from Last 30 Encounters:   04/03/21 0304 63.4 kg (139 lb 12.4 oz)   04/02/21 0536 62.6 kg (138 lb 0.1 oz)   04/01/21 1645 60.8 kg (134 lb)        BMI kg/m2 Body mass index is 19.49 kg/m².       Labs/Medications         Pertinent Labs -elevated BUN   Results from last 7 days   Lab Units 04/03/21  0346 04/02/21  0338 04/01/21  1725   SODIUM mmol/L 137 138 142   POTASSIUM mmol/L 4.8 4.0 4.4   CHLORIDE mmol/L 104 102 104   CO2 mmol/L 24.0 24.0 26.0   BUN mg/dL 25* 23 26*   CREATININE mg/dL 0.79 0.79 0.96   CALCIUM mg/dL 8.3* 8.4* 9.0   BILIRUBIN mg/dL 0.2 0.2 0.3   ALK PHOS U/L 56 59 69   ALT (SGPT) U/L 23 24 30   AST (SGOT) U/L 24 20 21   GLUCOSE mg/dL 123* 99 145*     Results from last 7 days   Lab Units 04/03/21  0346 04/02/21  0338   MAGNESIUM mg/dL 2.1 1.9   HEMOGLOBIN g/dL 12.1* 11.6*   HEMATOCRIT % 37.7 36.3*     COVID19   Date Value Ref Range Status   04/01/2021 Not Detected Not Detected - Ref. Range Final     No results found for: HGBA1C      Pertinent Medications Cefepime, gabapentin, mucinex, protonix, zoloft, vanc     Physical Findings        Overall Physical   Appearance, MSA Close visually inspection completed d/t patient intermittently resting. No wasting observed in temple/orbital, clavicle region. Arms are contracted. Appearance is consistent with chronic MS   --  Edema  No edema observed     Gastrointestinal BM 4/2     Tubes N/A     Oral/Mouth Cavity Missing teeth - see RD narrative r/t reports of dysphagia, now s/p dilation     Skin Unstageable on sacral spine       Estimated/Assessed Needs       Energy Requirements    Height for Calculation  Height: 180.3 cm (71\")   Weight for Calculation 63.4 kg (139 lb) "   Method for Estimation  30 kcal/kg - bedbound with unstageable wound   EST Needs (kcal/day) 1902 kcals/day       Protein Requirements    Weight for Calculation 63.4 kg (139 lb)   EST Protein Needs (g/kg) 1.5 g/kg   EST Daily Needs (g/day) 95 g/day       Fluid Requirements     Estimated Needs (mL/day) 1902 mL/day ->1 mL/kcal    Adjust based on hydration status       Fluid Deficit    Current Na Level (mEq/L) -   Desired Na Level (mEq/L) -   Estimated Fluid Deficit (L)  -     Current Nutrition Orders & Evaluation of Intake       Oral Nutrition     Current PO Diet Diet Texture; Soft to Chew   Supplement -   PO Evaluation     % PO Intake 100% x2 meals documented   --  Clinical Course    Nutrition Course Details PO Diet:  4/1 Consistent CHO  4/2 NPO  4/2 Regular  4/2 to present (4/3) Soft to Chew     Nutritional Risk Screening        NRS-2002 Score   -       Nutrition Diagnosis         Nutrition Dx Problem 1 Increased protein need r/t increased demand for nutrient AEB unstageable on sacral spine      Nutrition Dx Problem 2 -       Intervention Goal         Intervention Goal(s) Maintain PO intakes >75% of meals    Accepting of ONS     Nutrition Intervention        RD/Tech Action Continue soft to chew diet and recommend ST consult/eval if issues with dysphagia continue    Ordering Boost Plus BID and magic cups at lunch dinner meals    Ordering Tunde BID     Nutrition Prescription          Diet Prescription Soft to Chew   Supplement Prescription Boost Plus BID  Magic Cups at lunch/dinner meals  Tunde BID   --  Monitor/Evaluation        Monitor PO intakes, BMs, abd pain/distention, N/V, labs (electrolytes, BUN/Cr, glucose), wt for changes, skin integrity, at next encounter     Electronically signed by:  Paola Espinoza RD  04/03/21 09:43 EDT

## 2021-04-04 LAB
ANION GAP SERPL CALCULATED.3IONS-SCNC: 11 MMOL/L (ref 5–15)
BACTERIA SPEC AEROBE CULT: ABNORMAL
BASOPHILS # BLD AUTO: 0.1 10*3/MM3 (ref 0–0.2)
BASOPHILS NFR BLD AUTO: 1.1 % (ref 0–1.5)
BUN SERPL-MCNC: 32 MG/DL (ref 8–23)
BUN/CREAT SERPL: 40.5 (ref 7–25)
CALCIUM SPEC-SCNC: 8.3 MG/DL (ref 8.6–10.5)
CHLORIDE SERPL-SCNC: 105 MMOL/L (ref 98–107)
CK SERPL-CCNC: 40 U/L (ref 20–200)
CO2 SERPL-SCNC: 22 MMOL/L (ref 22–29)
CREAT SERPL-MCNC: 0.79 MG/DL (ref 0.76–1.27)
DEPRECATED RDW RBC AUTO: 42 FL (ref 37–54)
EOSINOPHIL # BLD AUTO: 0.2 10*3/MM3 (ref 0–0.4)
EOSINOPHIL NFR BLD AUTO: 1.3 % (ref 0.3–6.2)
ERYTHROCYTE [DISTWIDTH] IN BLOOD BY AUTOMATED COUNT: 15.5 % (ref 12.3–15.4)
GFR SERPL CREATININE-BSD FRML MDRD: 95 ML/MIN/1.73
GLUCOSE SERPL-MCNC: 90 MG/DL (ref 65–99)
GRAM STN SPEC: ABNORMAL
HCT VFR BLD AUTO: 32.2 % (ref 37.5–51)
HGB BLD-MCNC: 10.6 G/DL (ref 13–17.7)
LYMPHOCYTES # BLD AUTO: 2.7 10*3/MM3 (ref 0.7–3.1)
LYMPHOCYTES NFR BLD AUTO: 23.1 % (ref 19.6–45.3)
MAGNESIUM SERPL-MCNC: 1.9 MG/DL (ref 1.6–2.4)
MCH RBC QN AUTO: 25 PG (ref 26.6–33)
MCHC RBC AUTO-ENTMCNC: 32.9 G/DL (ref 31.5–35.7)
MCV RBC AUTO: 76.1 FL (ref 79–97)
MONOCYTES # BLD AUTO: 1 10*3/MM3 (ref 0.1–0.9)
MONOCYTES NFR BLD AUTO: 8.8 % (ref 5–12)
NEUTROPHILS NFR BLD AUTO: 65.7 % (ref 42.7–76)
NEUTROPHILS NFR BLD AUTO: 7.6 10*3/MM3 (ref 1.7–7)
NRBC BLD AUTO-RTO: 0.1 /100 WBC (ref 0–0.2)
PLATELET # BLD AUTO: 512 10*3/MM3 (ref 140–450)
PMV BLD AUTO: 7.3 FL (ref 6–12)
POTASSIUM SERPL-SCNC: 4.3 MMOL/L (ref 3.5–5.2)
RBC # BLD AUTO: 4.23 10*6/MM3 (ref 4.14–5.8)
SODIUM SERPL-SCNC: 138 MMOL/L (ref 136–145)
WBC # BLD AUTO: 11.6 10*3/MM3 (ref 3.4–10.8)

## 2021-04-04 PROCEDURE — 82550 ASSAY OF CK (CPK): CPT | Performed by: NURSE PRACTITIONER

## 2021-04-04 PROCEDURE — 25010000003 AMPICILLIN-SULBACTAM PER 1.5 G: Performed by: INTERNAL MEDICINE

## 2021-04-04 PROCEDURE — 80048 BASIC METABOLIC PNL TOTAL CA: CPT | Performed by: FAMILY MEDICINE

## 2021-04-04 PROCEDURE — 83735 ASSAY OF MAGNESIUM: CPT | Performed by: SURGERY

## 2021-04-04 PROCEDURE — 25010000002 CEFEPIME PER 500 MG: Performed by: SURGERY

## 2021-04-04 PROCEDURE — 85025 COMPLETE CBC W/AUTO DIFF WBC: CPT | Performed by: FAMILY MEDICINE

## 2021-04-04 PROCEDURE — 25010000002 ENOXAPARIN PER 10 MG: Performed by: SURGERY

## 2021-04-04 PROCEDURE — 99232 SBSQ HOSP IP/OBS MODERATE 35: CPT | Performed by: FAMILY MEDICINE

## 2021-04-04 RX ADMIN — GUAIFENESIN 1200 MG: 600 TABLET, EXTENDED RELEASE ORAL at 08:33

## 2021-04-04 RX ADMIN — Medication 10 ML: at 08:34

## 2021-04-04 RX ADMIN — SODIUM CHLORIDE 3 G: 900 INJECTION INTRAVENOUS at 16:59

## 2021-04-04 RX ADMIN — Medication 10 ML: at 20:58

## 2021-04-04 RX ADMIN — GABAPENTIN 600 MG: 300 CAPSULE ORAL at 20:57

## 2021-04-04 RX ADMIN — GABAPENTIN 600 MG: 300 CAPSULE ORAL at 08:33

## 2021-04-04 RX ADMIN — DAKIN'S SOLUTION 0.125% (QUARTER STRENGTH): 0.12 SOLUTION at 09:33

## 2021-04-04 RX ADMIN — GUAIFENESIN 1200 MG: 600 TABLET, EXTENDED RELEASE ORAL at 20:57

## 2021-04-04 RX ADMIN — CEFEPIME HYDROCHLORIDE 2 G: 2 INJECTION, POWDER, FOR SOLUTION INTRAVENOUS at 05:16

## 2021-04-04 RX ADMIN — Medication 3 ML: at 20:57

## 2021-04-04 RX ADMIN — DAKIN'S SOLUTION 0.125% (QUARTER STRENGTH): 0.12 SOLUTION at 20:57

## 2021-04-04 RX ADMIN — Medication 1 PACKET: at 09:33

## 2021-04-04 RX ADMIN — SODIUM CHLORIDE 3 G: 900 INJECTION INTRAVENOUS at 22:52

## 2021-04-04 RX ADMIN — AMLODIPINE BESYLATE 10 MG: 5 TABLET ORAL at 08:33

## 2021-04-04 RX ADMIN — Medication 3 ML: at 08:34

## 2021-04-04 RX ADMIN — SERTRALINE 50 MG: 50 TABLET, FILM COATED ORAL at 20:57

## 2021-04-04 RX ADMIN — SODIUM CHLORIDE 3 G: 900 INJECTION INTRAVENOUS at 10:47

## 2021-04-04 RX ADMIN — Medication 1 PACKET: at 20:57

## 2021-04-04 RX ADMIN — PANTOPRAZOLE SODIUM 40 MG: 40 TABLET, DELAYED RELEASE ORAL at 08:33

## 2021-04-04 RX ADMIN — ENOXAPARIN SODIUM 40 MG: 40 INJECTION SUBCUTANEOUS at 15:55

## 2021-04-04 NOTE — PROGRESS NOTES
Infectious Diseases Progress Note      LOS: 3 days   Patient Care Team:  Leland Santo MD as PCP - General  Jessenia Pederson, RN as Registered Nurse    Chief Complaint: Wound    Subjective       The patient has been afebrile for the last 24 hours.  The patient is on room air, hemodynamically stable, and is tolerating antimicrobial therapy.      Review of Systems:   Review of Systems   Unable to perform ROS: Acuity of condition   Skin: Positive for wound.        Objective     Vital Signs  Temp:  [97.7 °F (36.5 °C)-98.6 °F (37 °C)] 98.6 °F (37 °C)  Heart Rate:  [75-88] 87  Resp:  [16-18] 18  BP: (114-148)/(66-80) 148/80    Physical Exam:  Physical Exam  Vitals and nursing note reviewed.   Constitutional:       General: He is not in acute distress.     Appearance: He is well-developed and normal weight. He is not diaphoretic.   HENT:      Head: Normocephalic and atraumatic.   Eyes:      Extraocular Movements: Extraocular movements intact.      Conjunctiva/sclera: Conjunctivae normal.      Pupils: Pupils are equal, round, and reactive to light.   Cardiovascular:      Rate and Rhythm: Normal rate and regular rhythm.      Heart sounds: Normal heart sounds, S1 normal and S2 normal.   Pulmonary:      Effort: Pulmonary effort is normal. No respiratory distress.      Breath sounds: Normal breath sounds. No stridor. No wheezing or rales.   Abdominal:      General: Bowel sounds are normal. There is no distension.      Palpations: Abdomen is soft. There is no mass.      Tenderness: There is no abdominal tenderness. There is no guarding.   Genitourinary:     Comments: Matos catheter  Musculoskeletal:         General: No deformity.      Cervical back: Neck supple.      Comments: Contractures of upper extremities   Skin:     General: Skin is warm and dry.      Coloration: Skin is not pale.      Findings: No erythema or rash.      Comments: Sacral wound is currently packed   Neurological:      Mental Status: He is alert.       Cranial Nerves: No cranial nerve deficit.      Comments: Alert and awake          Results Review:    I have reviewed all clinical data, test, lab, and imaging results.     Radiology  No Radiology Exams Resulted Within Past 24 Hours    Cardiology    Laboratory    Results from last 7 days   Lab Units 04/04/21  0232 04/03/21 0346 04/02/21  0338 04/01/21  1725   WBC 10*3/mm3 11.60* 11.50* 12.00* 12.60*   HEMOGLOBIN g/dL 10.6* 12.1* 11.6* 13.9   HEMATOCRIT % 32.2* 37.7 36.3* 43.9   PLATELETS 10*3/mm3 512* 497* 497* 508*     Results from last 7 days   Lab Units 04/04/21  0232 04/03/21  0346 04/02/21  0338 04/01/21  1725   SODIUM mmol/L 138 137 138 142   POTASSIUM mmol/L 4.3 4.8 4.0 4.4   CHLORIDE mmol/L 105 104 102 104   CO2 mmol/L 22.0 24.0 24.0 26.0   BUN mg/dL 32* 25* 23 26*   CREATININE mg/dL 0.79 0.79 0.79 0.96   GLUCOSE mg/dL 90 123* 99 145*   ALBUMIN g/dL  --  2.90* 2.90* 3.60   BILIRUBIN mg/dL  --  0.2 0.2 0.3   ALK PHOS U/L  --  56 59 69   AST (SGOT) U/L  --  24 20 21   ALT (SGPT) U/L  --  23 24 30   CALCIUM mg/dL 8.3* 8.3* 8.4* 9.0     Results from last 7 days   Lab Units 04/04/21  0232   CK TOTAL U/L 40     Results from last 7 days   Lab Units 04/03/21  0346   SED RATE mm/hr 120*         Microbiology   Microbiology Results (last 10 days)     Procedure Component Value - Date/Time    MRSA Screen, PCR (Inpatient) - Swab, Nares [[id number]]  (Normal) Collected: 04/01/21 2200    Lab Status: Final result Specimen: Swab from Nares Updated: 04/02/21 0056     MRSA PCR No MRSA Detected    COVID PRE-OP / PRE-PROCEDURE SCREENING ORDER (NO ISOLATION) - Swab, Nasopharynx [[id number]]  (Normal) Collected: 04/01/21 2022    Lab Status: Final result Specimen: Swab from Nasopharynx Updated: 04/01/21 2217    Narrative:      The following orders were created for panel order COVID PRE-OP / PRE-PROCEDURE SCREENING ORDER (NO ISOLATION) - Swab, Nasopharynx.  Procedure                               Abnormality         Status                      ---------                               -----------         ------                     COVID-19,CEPHEID,COR/ALFIE...[[id number]]  Normal              Final result                 Please view results for these tests on the individual orders.    COVID-19,CEPHEID,COR/ALFIE/PAD IN-HOUSE(OR EMERGENT/ADD-ON),NP SWAB IN TRANSPORT MEDIA 3-4 HR TAT, RT-PCR - Swab, Nasopharynx [[id number]]  (Normal) Collected: 04/01/21 2022    Lab Status: Final result Specimen: Swab from Nasopharynx Updated: 04/01/21 2217     COVID19 Not Detected    Narrative:      Fact sheet for providers: https://www.fda.gov/media/999783/download     Fact sheet for patients: https://www.fda.gov/media/440413/download    Blood Culture - Blood, Arm, Left [[id number]]  (Abnormal) Collected: 04/01/21 1725    Lab Status: Final result Specimen: Blood from Arm, Left Updated: 04/03/21 0811     Blood Culture Staphylococcus, coagulase negative     Comment: Probable contaminant requires clinical correlation, susceptibility not performed unless requested by physician.          Gram Stain Anaerobic Bottle Gram positive cocci in clusters      Aerobic Bottle Gram positive cocci in clusters    Blood Culture ID, PCR - Blood, Arm, Left [[id number]]  (Abnormal) Collected: 04/01/21 1725    Lab Status: Final result Specimen: Blood from Arm, Left Updated: 04/02/21 1303     BCID, PCR Staphylococcus spp, not aureus. Identification by BCID PCR.     BOTTLE TYPE Anaerobic Bottle    Blood Culture - Blood, Arm, Right [[id number]] Collected: 04/01/21 1715    Lab Status: Preliminary result Specimen: Blood from Arm, Right Updated: 04/03/21 1745     Blood Culture No growth at 2 days    Wound Culture - Wound, Coccyx [[id number]]  (Abnormal)  (Susceptibility) Collected: 04/01/21 1420    Lab Status: Final result Specimen: Wound from Coccyx Updated: 04/04/21 0845     Wound Culture Light growth (2+) Enterococcus faecalis      Moderate growth (3+) Staphylococcus aureus      Moderate growth (3+)  Normal Skin Leatha     Gram Stain No organisms seen    Susceptibility      Enterococcus faecalis Staphylococcus aureus      MARK MARK      Ampicillin Susceptible       Clindamycin  Resistant      Erythromycin  Resistant      Inducible Clindamycin Resistance  Positive      Oxacillin  Susceptible      Penicillin G  Resistant      Rifampin  Susceptible      Tetracycline  Susceptible      Trimethoprim + Sulfamethoxazole  Susceptible      Vancomycin Susceptible Susceptible                 Linear View               Susceptibility Comments     Staphylococcus aureus    This isolate is presumed to be clindamycin resistant based on detection of inducible clindamycin resistance.  Clindamycin may still be effective in some patients.                   Medication Review:       Schedule Meds  amLODIPine, 10 mg, Oral, Daily  ampicillin-sulbactam, 3 g, Intravenous, Q6H  enoxaparin, 40 mg, Subcutaneous, Q24H  gabapentin, 600 mg, Oral, Q12H  guaiFENesin, 1,200 mg, Oral, Q12H  Tunde, 1 packet, Oral, BID  pantoprazole, 40 mg, Oral, QAM AC  sertraline, 50 mg, Oral, Nightly  sodium chloride, 10 mL, Intravenous, Q12H  sodium chloride, 3 mL, Intravenous, Q12H  sodium hypochlorite, , Topical, BID        Infusion Meds  Pharmacy to Dose enoxaparin (LOVENOX),         PRN Meds  •  acetaminophen **OR** acetaminophen **OR** acetaminophen  •  hydrALAZINE  •  HYDROcodone-acetaminophen **OR** HYDROcodone-acetaminophen  •  magnesium sulfate **OR** magnesium sulfate in D5W 1g/100mL (PREMIX)  •  ondansetron **OR** ondansetron  •  Pharmacy to Dose enoxaparin (LOVENOX)  •  potassium chloride  •  sodium chloride  •  sodium chloride        Assessment/Plan       Antimicrobial Therapy   1.  Vancomycin      day  2.  Cefepime      day  3.      Day  4.      Day  5.      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  The patient is s/p surgical debridement on April 4, 2021  MRI of the pelvis show osteomyelitis of the  sacrum  -Cultures grew methicillin susceptible Staphylococcus aureus and Enterococcus faecalis.  The last organism was susceptible to vancomycin and ampicillin     The patient is bedridden for 10 years secondary to multiple sclerosis     Chronic indwelling Matos catheter.  Catheter had been replaced a week ago    1 out of 2 blood cultures is growing coagulase-negative Staphylococcus-this is likely contamination     Plan     Discontinue IV vancomycin and IV cefepime  Start Unasyn 3 g IV every 6 hours for 6 weeks  The patient will need weekly labs including CBC, CMP and sed rate for 6 weeks  May discharge home soon after setting up home IV antibiotics.  The patient will need PICC line before discharge and recommend to remove PICC line after 6 weeks    Please fax all post discharge lab results, imaging studies and correspondence to this fax number [phone]  For any question or concern please contact our service number [phone]       Marck Hurley MD  04/04/21  15:08 EDT     Note is dictated utilizing voice recognition software/Dragon

## 2021-04-04 NOTE — PLAN OF CARE
Problem: Adult Inpatient Plan of Care  Goal: Absence of Hospital-Acquired Illness or Injury  Intervention: Identify and Manage Fall Risk  Recent Flowsheet Documentation  Taken 4/4/2021 0330 by Evans Moody LPN  Safety Promotion/Fall Prevention:   assistive device/personal items within reach   clutter free environment maintained   elopement precautions   fall prevention program maintained   gait belt   lighting adjusted   toileting scheduled   safety round/check completed   room organization consistent   nonskid shoes/slippers when out of bed   muscle strengthening facilitated   mobility aid in reach  Taken 4/4/2021 0105 by Evans Moody LPN  Safety Promotion/Fall Prevention:   safety round/check completed   room organization consistent   nonskid shoes/slippers when out of bed   muscle strengthening facilitated   mobility aid in reach   lighting adjusted   fall prevention program maintained   elopement precautions   clutter free environment maintained   assistive device/personal items within reach   activity supervised  Taken 4/3/2021 2310 by Evans Moody LPN  Safety Promotion/Fall Prevention:   safety round/check completed   room organization consistent   nonskid shoes/slippers when out of bed   muscle strengthening facilitated   mobility aid in reach   lighting adjusted   activity supervised   assistive device/personal items within reach   clutter free environment maintained   elopement precautions   fall prevention program maintained  Taken 4/3/2021 2105 by Evans Moody LPN  Safety Promotion/Fall Prevention:   safety round/check completed   room organization consistent   nonskid shoes/slippers when out of bed   muscle strengthening facilitated   mobility aid in reach   lighting adjusted   fall prevention program maintained   clutter free environment maintained   assistive device/personal items within reach   activity supervised  Taken 4/3/2021 1955 by Evans Moody  LPN  Safety Promotion/Fall Prevention:   activity supervised   mobility aid in reach  Intervention: Prevent Skin Injury  Recent Flowsheet Documentation  Taken 4/4/2021 0105 by Evans Moody LPN  Body Position: position maintained  Taken 4/3/2021 2310 by Evans Moody LPN  Body Position: position changed independently  Taken 4/3/2021 2105 by Evans Moody LPN  Body Position: position maintained  Taken 4/3/2021 1955 by Evans Moody LPN  Body Position: position maintained  Skin Protection:   adhesive use limited   incontinence pads utilized  Intervention: Prevent Infection  Recent Flowsheet Documentation  Taken 4/4/2021 0330 by Evans Moody LPN  Infection Prevention:   visitors restricted/screened   single patient room provided   rest/sleep promoted   hand hygiene promoted   personal protective equipment utilized   equipment surfaces disinfected  Taken 4/4/2021 0105 by Evans Moody LPN  Infection Prevention:   visitors restricted/screened   single patient room provided   rest/sleep promoted   personal protective equipment utilized   hand hygiene promoted  Taken 4/3/2021 2310 by Evans Moody LPN  Infection Prevention:   single patient room provided   rest/sleep promoted   personal protective equipment utilized   hand hygiene promoted   equipment surfaces disinfected  Taken 4/3/2021 2105 by Evans Moody LPN  Infection Prevention:   visitors restricted/screened   single patient room provided   rest/sleep promoted   personal protective equipment utilized   hand hygiene promoted  Taken 4/3/2021 1955 by Evans Moody LPN  Infection Prevention:   visitors restricted/screened   single patient room provided   personal protective equipment utilized   rest/sleep promoted   hand hygiene promoted   equipment surfaces disinfected  Goal: Optimal Comfort and Wellbeing  Intervention: Provide Person-Centered Care  Recent Flowsheet Documentation  Taken 4/3/2021 1955 by  Evans Moody LPN  Trust Relationship/Rapport: care explained     Problem: Fall Injury Risk  Goal: Absence of Fall and Fall-Related Injury  Intervention: Identify and Manage Contributors to Fall Injury Risk  Recent Flowsheet Documentation  Taken 4/4/2021 0330 by Evans Moody LPN  Medication Review/Management: medications reviewed  Taken 4/4/2021 0105 by Evans Moody LPN  Medication Review/Management: medications reviewed  Taken 4/3/2021 2310 by Evans Moody LPN  Medication Review/Management: medications reviewed  Taken 4/3/2021 2105 by Evans Moody LPN  Medication Review/Management: medications reviewed  Taken 4/3/2021 1955 by Evans Moody LPN  Medication Review/Management: medications reviewed  Intervention: Promote Injury-Free Environment  Recent Flowsheet Documentation  Taken 4/4/2021 0330 by Evans Moody LPN  Safety Promotion/Fall Prevention:   assistive device/personal items within reach   clutter free environment maintained   elopement precautions   fall prevention program maintained   gait belt   lighting adjusted   toileting scheduled   safety round/check completed   room organization consistent   nonskid shoes/slippers when out of bed   muscle strengthening facilitated   mobility aid in reach  Taken 4/4/2021 0105 by Evans Moody LPN  Safety Promotion/Fall Prevention:   safety round/check completed   room organization consistent   nonskid shoes/slippers when out of bed   muscle strengthening facilitated   mobility aid in reach   lighting adjusted   fall prevention program maintained   elopement precautions   clutter free environment maintained   assistive device/personal items within reach   activity supervised  Taken 4/3/2021 2310 by Evans Moody LPN  Safety Promotion/Fall Prevention:   safety round/check completed   room organization consistent   nonskid shoes/slippers when out of bed   muscle strengthening facilitated   mobility aid in  reach   lighting adjusted   activity supervised   assistive device/personal items within reach   clutter free environment maintained   elopement precautions   fall prevention program maintained  Taken 4/3/2021 2105 by Evans Moody LPN  Safety Promotion/Fall Prevention:   safety round/check completed   room organization consistent   nonskid shoes/slippers when out of bed   muscle strengthening facilitated   mobility aid in reach   lighting adjusted   fall prevention program maintained   clutter free environment maintained   assistive device/personal items within reach   activity supervised  Taken 4/3/2021 1955 by Evans Moody LPN  Safety Promotion/Fall Prevention:   activity supervised   mobility aid in reach     Problem: Skin Injury Risk Increased  Goal: Skin Health and Integrity  Intervention: Optimize Skin Protection  Recent Flowsheet Documentation  Taken 4/4/2021 0105 by Evans Moody LPN  Head of Bed (HOB): HOB at 20-30 degrees  Taken 4/3/2021 2310 by Evans Moody LPN  Head of Bed (HOB): HOB at 20-30 degrees  Taken 4/3/2021 2105 by Evans Moody LPN  Head of Bed (HOB): HOB at 20-30 degrees  Taken 4/3/2021 1955 by Evans Moody LPN  Pressure Reduction Techniques: frequent weight shift encouraged  Head of Bed (HOB): HOB at 20-30 degrees  Pressure Reduction Devices: pressure-redistributing mattress utilized  Skin Protection:   adhesive use limited   incontinence pads utilized     Problem: Wound  Goal: Optimal Wound Healing  Intervention: Promote Effective Wound Healing  Recent Flowsheet Documentation  Taken 4/4/2021 0330 by Evans Moody LPN  Pain Management Interventions:   see MAR   quiet environment facilitated   position adjusted   pain management plan reviewed with patient/caregiver   medication offered but refused  Sleep/Rest Enhancement: awakenings minimized  Taken 4/3/2021 2310 by Evans Moody LPN  Pain Management Interventions:   see MAR   quiet  environment facilitated   position adjusted   pain management plan reviewed with patient/caregiver   medication offered but refused  Taken 4/3/2021 1955 by Evans Moody LPN  Pain Management Interventions:   see MAR   quiet environment facilitated   position adjusted   pain management plan reviewed with patient/caregiver   medication offered but refused  Sleep/Rest Enhancement:   awakenings minimized   room darkened   regular sleep/rest pattern promoted   noise level reduced   natural light exposure provided   Goal Outcome Evaluation:

## 2021-04-04 NOTE — NURSING NOTE
drsg change at beginnig of shift , underlying cloth soak with blood and drainage and again by RN at 0345 noted less blood but more clots dankins WD with ABD check at 0515 noted not blood on drsg or pads cont to monitor

## 2021-04-04 NOTE — PROGRESS NOTES
[facility] Medicine Services Daily Progress Note      Hospitalist Team  LOS 3 days      Patient Care Team:  Leland Santo MD as PCP - Jessenia Duque RN as Registered Nurse    Patient Location: 367/1      Subjective   Subjective     Chief Complaint / Subjective  No chief complaint on file.    Patient with no new concerns.  No chest pain or shortness of breath.  Wound culture growing Enterococcus and staph aureus, antibiotics to be adjusted.    Brief Synopsis of Hospital Course/HPI  Mr. Singh is a 80 y.o. male with a past medical history of hypertension, CVA, hyperlipidemia, MS, and type 2 diabetes mellitus who was directly admitted to [facility] ED by Dr. Graham from [facility] wound care center on 4/1/2021 due to an necrotic sacral wound.  Hospital accepted the patient for further care management.  Per patient's wife this all started in December 2020 when she noticed a pressure ulcer on his sacrum.  She had home health coming out and eventually they decided he would need to go to the wound care center because it started to drain yellow discharge and have a foul odor.  Patient started to go to the wound care center 1 month ago and was supposed to follow-up last Thursday, however had a fever so he did not follow-up at that time.  Patient's wife was instructed to bring patient to the ED if his fever got above 101.0.  She ended up bringing him to the wound care center today for follow-up.  She reports he has had multiple pressure ulcers in the past that were just red spots, but would eventually heal.  She explains he has a neurogenic bladder and has a chronic Matos catheter.  She also explains that he has bowel incontinence and she feeds him his meals due to current contracted arms.  Patient was able to deny he is in pain.  Patient is currently alert and oriented x3. ID and general surgery were consulted upon admission. Patient was started on empiric  antibiotics and wound culture was collected at the Wound care center.      4/2/2021: Patient seen postoperatively this morning after wound debridement.  Patient somnolent but arousable.  Patient's wife at bedside, patient with no obvious concerns at this time.  Patient with significant weight loss and debility, wife is reporting that he has had long history of dysphagia associated with his MS and has multiple dilations reported more issues with food as of late wanted to see gastroenterology.  GI to be consulted.    4/3/2021: Patient seen by gastroenterology yesterday underwent EGD with dilation.  Possible discussion of Botox in the future by GI.  Patient tolerated procedure well.  Patient also seen by infectious disease, continuing broad-spectrum antibiotics until cultures return.  Today patient much more awake and alert denies any pain or shortness of breath.  Patient meeting with nutrition in order to address malnutrition secondary to progressive MS.  Date::          Review of Systems   Constitutional: Negative for chills and fever.   HENT: Negative for hoarse voice and sore throat.    Eyes: Negative for double vision and photophobia.   Cardiovascular: Negative for chest pain and palpitations.   Respiratory: Negative for cough and shortness of breath.    Musculoskeletal: Negative for falls and stiffness.   Gastrointestinal: Negative for nausea and vomiting.   Genitourinary: Negative for dysuria and flank pain.   Neurological: Negative for dizziness and seizures.   Psychiatric/Behavioral: Negative for altered mental status. The patient is not nervous/anxious.          Objective   Objective      Vital Signs  Temp:  [97.7 °F (36.5 °C)-98.6 °F (37 °C)] 98.6 °F (37 °C)  Heart Rate:  [73-88] 87  Resp:  [16-18] 18  BP: (114-148)/(65-80) 148/80  Oxygen Therapy  SpO2: 92 %  Pulse Oximetry Type: Continuous  Device (Oxygen Therapy): room air  Flow (L/min): 3  ETCO2 (mmHg): 30 mmHg  Flowsheet Rows      First Filed Value  "  Admission Height  180.3 cm (71\") Documented at 04/01/2021 1645   Admission Weight  60.8 kg (134 lb) Documented at 04/01/2021 1645        Intake & Output (last 3 days)       04/01 0701 - 04/02 0700 04/02 0701 - 04/03 0700 04/03 0701 - 04/04 0700 04/04 0701 - 04/05 0700    P.O. 200 240 240 720    I.V. (mL/kg)  900 (14.2)      IV Piggyback   350     Total Intake(mL/kg) 200 (3.2) 1140 (18) 590 (9.2) 720 (11.3)    Urine (mL/kg/hr) 825 1100 (0.7) 1450 (0.9) 650 (1.8)    Stool  0  0    Blood  15      Total Output 825 1115 1450 650    Net -625 +25 -860 +70            Stool Unmeasured Occurrence  2 x  1 x        Lines, Drains & Airways    Active LDAs     Name:   Placement date:   Placement time:   Site:   Days:    Peripheral IV 04/01/21 2021 Anterior;Right Forearm   04/01/21 2021    Forearm   less than 1    Urethral Catheter   04/01/21    1500 unknown placement date and time     less than 1                  Physical Exam:    Physical Exam    General: Frail elderly male lying in bed breathing comfortably on room air no acute distress  HEENT: NC/AT, EOMI, mucosa moist  Heart: Regular, rate controlled  Chest: Normal work of breathing, moving air well no wheezing  Abdominal: Soft. NT/ND.   Musculoskeletal: Diminished range of motion, arm contractures noted bilaterally.  No edema. No calf tenderness.  Neurological: Awake and alert, spasticity noted  Skin: Skin is warm and dry.  Unstageable ulcer present, wound dressing clean dry and intact  Psychiatric: Normal mood and affect.          Wounds (last 24 hours)      LDA Wound     Row Name 04/04/21 0705 04/03/21 1955          Wound 04/02/21 0643 sacral spine    Wound - Properties Group Placement Date: 04/02/21  -KK Placement Time: 0643 -KK Location: sacral spine  -KK Stage, Pressure Injury : unstageable  -KK    Dressing Appearance  --  moist drainage  -AH     Closure  KERRIE  -KT  --  -AH     Base  red surgical dressing  -KT  red surgical dressing  -AH     Periwound  blanchable  " -KT  blanchable  -AH     Periwound Skin Turgor  soft  -KT  soft  -AH     Drainage Amount  scant  -KT  large;none  -     Care, Wound  --  cleansed with;sterile normal saline  -AH     Dressing Care  --  dressing applied;packed with;gauze;foam;abdominal pad  -AH     Retired Wound - Properties Group Date first assessed: 04/02/21  -KK Time first assessed: 0643  -KK Location: sacral spine  -KK       Wound 04/01/21 1700 Right medial gluteal Degloving injury    Wound - Properties Group Placement Date: 04/01/21  -BJ Placement Time: 1700 -BJ Present on Hospital Admission: Y  -BJ Side: Right  -BJ Orientation: medial  -BJ Location: gluteal  -BJ Primary Wound Type: Degloving in  -BJ Stage, Pressure Injury : deep tissue injury  -BJ    Retired Wound - Properties Group Date first assessed: 04/01/21 -BJ Time first assessed: 1700  -BJ Present on Hospital Admission: Y  -BJ Side: Right  -BJ Location: gluteal  -BJ Primary Wound Type: Degloving in  -BJ       Wound 04/02/21 0739 sacral spine Incision    Wound - Properties Group Placement Date: 04/02/21  -AM Placement Time: 0739  -AM Location: sacral spine  -AM Primary Wound Type: Incision  -AM    Closure  KERRIE  -KT  --     Base  dressing in place, unable to visualize  -KT  --     Retired Wound - Properties Group Date first assessed: 04/02/21  -AM Time first assessed: 0739  -AM Location: sacral spine  -AM Primary Wound Type: Incision  -AM      User Key  (r) = Recorded By, (t) = Taken By, (c) = Cosigned By    Initials Name Provider Type    AM Frances Sanchez, RN Registered Nurse    Elda Zabala, RN Registered Nurse    Haily Grace RN Registered Nurse    Evans Nieto LPN Licensed Nurse    Jessenia Mathias, RN Registered Nurse          Procedures:    Procedure(s):  DEBRIDEMENT OF ISCHEAL ULCER/BUTTOCKS WOUND          Results Review:     I reviewed the patient's new clinical results.      Lab Results (last 24 hours)     Procedure Component Value Units Date/Time     Basic Metabolic Panel [[id number]]  (Abnormal) Collected: 04/04/21 0232    Specimen: Blood Updated: 04/04/21 0446     Glucose 90 mg/dL      BUN 32 mg/dL      Creatinine 0.79 mg/dL      Sodium 138 mmol/L      Potassium 4.3 mmol/L      Chloride 105 mmol/L      CO2 22.0 mmol/L      Calcium 8.3 mg/dL      eGFR Non African Amer 95 mL/min/1.73      BUN/Creatinine Ratio 40.5     Anion Gap 11.0 mmol/L     Narrative:      GFR Normal >60  Chronic Kidney Disease <60  Kidney Failure <15      Magnesium [[id number]]  (Normal) Collected: 04/04/21 0232    Specimen: Blood Updated: 04/04/21 0446     Magnesium 1.9 mg/dL     CK [[id number]]  (Normal) Collected: 04/04/21 0232    Specimen: Blood Updated: 04/04/21 0446     Creatine Kinase 40 U/L     CBC & Differential [[id number]]  (Abnormal) Collected: 04/04/21 0232    Specimen: Blood Updated: 04/04/21 0422    Narrative:      The following orders were created for panel order CBC & Differential.  Procedure                               Abnormality         Status                     ---------                               -----------         ------                     CBC Auto Differential[[id number]]        Abnormal            Final result                 Please view results for these tests on the individual orders.    CBC Auto Differential [[id number]]  (Abnormal) Collected: 04/04/21 0232    Specimen: Blood Updated: 04/04/21 0422     WBC 11.60 10*3/mm3      RBC 4.23 10*6/mm3      Hemoglobin 10.6 g/dL      Hematocrit 32.2 %      MCV 76.1 fL      MCH 25.0 pg      MCHC 32.9 g/dL      RDW 15.5 %      RDW-SD 42.0 fl      MPV 7.3 fL      Platelets 512 10*3/mm3      Neutrophil % 65.7 %      Lymphocyte % 23.1 %      Monocyte % 8.8 %      Eosinophil % 1.3 %      Basophil % 1.1 %      Neutrophils, Absolute 7.60 10*3/mm3      Lymphocytes, Absolute 2.70 10*3/mm3      Monocytes, Absolute 1.00 10*3/mm3      Eosinophils, Absolute 0.20 10*3/mm3      Basophils, Absolute 0.10 10*3/mm3      nRBC 0.1 /100 WBC      Vancomycin, Peak [[id number]]  (Abnormal) Collected: 04/03/21 2222    Specimen: Blood Updated: 04/03/21 2257     Vancomycin Peak 46.10 mcg/mL     Narrative:      Therapeutic Ranges for Vancomycin    Vancomycin Random   5.0-40.0 mcg/mL  Vancomycin Trough   5.0-20.0 mcg/mL  Vancomycin Peak     20.0-40.0 mcg/mL    Blood Culture - Blood, Arm, Right [[id number]] Collected: 04/01/21 1715    Specimen: Blood from Arm, Right Updated: 04/03/21 1745     Blood Culture No growth at 2 days        No results found for: HGBA1C            No results found for: LIPASE  No results found for: CHOL, CHLPL, TRIG, HDL, LDL, LDLDIRECT    No results found for: INTRAOP, PREDX, FINALDX, COMDX    Microbiology Results (last 10 days)     Procedure Component Value - Date/Time    MRSA Screen, PCR (Inpatient) - Swab, Nares [[id number]]  (Normal) Collected: 04/01/21 2200    Lab Status: Final result Specimen: Swab from Nares Updated: 04/02/21 0056     MRSA PCR No MRSA Detected    COVID PRE-OP / PRE-PROCEDURE SCREENING ORDER (NO ISOLATION) - Swab, Nasopharynx [[id number]]  (Normal) Collected: 04/01/21 2022    Lab Status: Final result Specimen: Swab from Nasopharynx Updated: 04/01/21 2217    Narrative:      The following orders were created for panel order COVID PRE-OP / PRE-PROCEDURE SCREENING ORDER (NO ISOLATION) - Swab, Nasopharynx.  Procedure                               Abnormality         Status                     ---------                               -----------         ------                     COVID-19,CEPHEID,COR/ALFIE...[[id number]]  Normal              Final result                 Please view results for these tests on the individual orders.    COVID-19,CEPHEID,COR/ALFIE/PAD IN-HOUSE(OR EMERGENT/ADD-ON),NP SWAB IN TRANSPORT MEDIA 3-4 HR TAT, RT-PCR - Swab, Nasopharynx [[id number]]  (Normal) Collected: 04/01/21 2022    Lab Status: Final result Specimen: Swab from Nasopharynx Updated: 04/01/21 2217     COVID19 Not Detected    Narrative:       Fact sheet for providers: https://www.fda.gov/media/644844/download     Fact sheet for patients: https://www.fda.gov/media/321694/download    Blood Culture - Blood, Arm, Left [[id number]]  (Abnormal) Collected: 04/01/21 1725    Lab Status: Final result Specimen: Blood from Arm, Left Updated: 04/03/21 0811     Blood Culture Staphylococcus, coagulase negative     Comment: Probable contaminant requires clinical correlation, susceptibility not performed unless requested by physician.          Gram Stain Anaerobic Bottle Gram positive cocci in clusters      Aerobic Bottle Gram positive cocci in clusters    Blood Culture ID, PCR - Blood, Arm, Left [[id number]]  (Abnormal) Collected: 04/01/21 1725    Lab Status: Final result Specimen: Blood from Arm, Left Updated: 04/02/21 1303     BCID, PCR Staphylococcus spp, not aureus. Identification by BCID PCR.     BOTTLE TYPE Anaerobic Bottle    Blood Culture - Blood, Arm, Right [[id number]] Collected: 04/01/21 1715    Lab Status: Preliminary result Specimen: Blood from Arm, Right Updated: 04/03/21 1745     Blood Culture No growth at 2 days    Wound Culture - Wound, Coccyx [[id number]]  (Abnormal)  (Susceptibility) Collected: 04/01/21 1420    Lab Status: Final result Specimen: Wound from Coccyx Updated: 04/04/21 0845     Wound Culture Light growth (2+) Enterococcus faecalis      Moderate growth (3+) Staphylococcus aureus      Moderate growth (3+) Normal Skin Leatha     Gram Stain No organisms seen    Susceptibility      Enterococcus faecalis Staphylococcus aureus      MARK MARK      Ampicillin Susceptible       Clindamycin  Resistant      Erythromycin  Resistant      Inducible Clindamycin Resistance  Positive      Oxacillin  Susceptible      Penicillin G  Resistant      Rifampin  Susceptible      Tetracycline  Susceptible      Trimethoprim + Sulfamethoxazole  Susceptible      Vancomycin Susceptible Susceptible                 Linear View               Susceptibility Comments      Staphylococcus aureus    This isolate is presumed to be clindamycin resistant based on detection of inducible clindamycin resistance.  Clindamycin may still be effective in some patients.                   ECG/EMG Results (most recent)     None                  MRI Pelvis With & Without Contrast    Result Date: 4/2/2021  1.Decubitus ulcer with extension to the posterior cortex of the lower sacrum where there appears to be abnormal marrow signal consistent with osteomyelitis. 2.Soft tissue wound posterior to the left greater trochanter.  Sinus tract extends to the left greater trochanter where there is a small focus of subcortical edema which could represent osteitis or early osteomyelitis. 3.Soft tissue edema and enhancement surrounding the wounds including in the gluteus tabatha muscles which may indicate skin and soft tissue infection and myositis. No sizable abscess is identified at this time. 4.Large amount of formed stool in the distal colon and rectum. Disimpaction may be necessary. 5.Left inguinal hernia containing a loop of colon. Electronically Signed: Uziel Britton MD 4/2/2021 18:51 EDT    XR Chest 1 View    Result Date: 4/1/2021  1. There is a poorly defined increased density in the left lower chest, which may be due to a large area of airspace consolidation from pneumonia or atelectasis. A lung mass is not excluded on this exam. There are no priors available. Recommend follow-up with PA and lateral views of the chest or CT chest.  Electronically Signed By-Jane Dorado MD On:4/1/2021 5:59 PM This report was finalized on [id number] by  Jane Dorado MD.          Xrays, labs reviewed personally by physician.    Medication Review:   I have reviewed the patient's current medication list      Scheduled Meds  amLODIPine, 10 mg, Oral, Daily  ampicillin-sulbactam, 3 g, Intravenous, Q6H  enoxaparin, 40 mg, Subcutaneous, Q24H  gabapentin, 600 mg, Oral, Q12H  guaiFENesin, 1,200 mg, Oral, Q12H  Tunde, 1 packet,  Oral, BID  pantoprazole, 40 mg, Oral, QAM AC  sertraline, 50 mg, Oral, Nightly  sodium chloride, 10 mL, Intravenous, Q12H  sodium chloride, 3 mL, Intravenous, Q12H  sodium hypochlorite, , Topical, BID        Meds Infusions  lactated ringers, 50 mL/hr, Last Rate: 50 mL/hr (04/03/21 1542)  Pharmacy to Dose enoxaparin (LOVENOX),         Meds PRN  •  acetaminophen **OR** acetaminophen **OR** acetaminophen  •  hydrALAZINE  •  HYDROcodone-acetaminophen **OR** HYDROcodone-acetaminophen  •  magnesium sulfate **OR** magnesium sulfate in D5W 1g/100mL (PREMIX)  •  ondansetron **OR** ondansetron  •  Pharmacy to Dose enoxaparin (LOVENOX)  •  potassium chloride  •  sodium chloride  •  sodium chloride        Assessment/Plan   Assessment/Plan     Active Hospital Problems    Diagnosis  POA   • **Sacral pressure ulcer [L89.159]  Yes   • Benign essential hypertension [I10]  Yes   • Hyperlipidemia [E78.5]  Yes   • Multiple sclerosis (CMS/HCC) [G35]  Yes   • Type 2 diabetes mellitus (CMS/HCC) [E11.9]  Yes   • GERD without esophagitis [K21.9]  Yes   • History of CVA (cerebrovascular accident) [Z86.73]  Not Applicable   • Neurogenic bladder [N31.9]  Yes   • Mild malnutrition (CMS/HCC) [E44.1]  Yes   • Bowel incontinence [R15.9]  Yes   • Pressure injury of sacral region, unstageable (CMS/HCC) [L89.150]  Unknown   • Dysphagia [R13.10]  Unknown      Resolved Hospital Problems   No resolved problems to display.       MEDICAL DECISION MAKING COMPLEXITY BY PROBLEM:     Infected pressure ulcer-patient appearing debilitated with significant sequelae from multiple sclerosis likely to have poor healing, also with poor nutrition.  Patient with surrounding cellulitic changes  -Wound care  -Nutrition consult  -IV antibiotics with Zosyn initially but wound growing staph aureus switch to vancomycin cefepime, then based on cultures to Unasyn  -ID consult, appreciate recommendation  -MRI of pelvis showing possible osteomyelitis near left greater  trochanter  -General surgery consulted, status post wound debridement in the OR  -Inflammatory markers significantly elevated  -Blood cultures no growth to date  -Culture wound, growing Enterococcus and staph aureus unspecified if MRSA at this time ID switched antibiotics to vancomycin and cefepime  -Daily CBC  -Given signs of osteopatient will need to be on 6 weeks of IV antibiotics     Leukocytosis-due to underlying infected ulcer  -Continue antibiotics  -Check daily    Thrombocytosis-likely reactive due to underlying infection, normalizing  -Monitor daily    Anemia-very mild may have small component of acute loss after wound debridement  -Monitor daily  -Patient appearing hemostatic at this time    Multiple sclerosis-with significant sequelae including multiple contractures chronic debility and signs of wasting  -Continue home medications  -PT/OT  -Patient's wife reports patient has issues with his meals, has copious thick secretions with eating and inhibits his ability to tolerate p.o.  -Added Mucinex  -GI consult for esophageal dysmotility issues, status post EGD with dilation on 4/2/2021 doing well post procedure, follow-up outpatient to discuss Botox in the future    Hypertension/GERD/depression-chronic in nature  -Resume home medication as clinically appropriate    CVA-patient previously reported, on antiplatelets  -Continue    Neurogenic bladder-due to MS  -Continue Matos cath    VTE Prophylaxis -   Mechanical Order History:     None      Pharmalogical Order History:      Ordered     Dose Route Frequency Stop    04/01/21 1739  [MAR Hold]  enoxaparin (LOVENOX) syringe 40 mg     (MAR Hold since Fri 4/2/2021 at 0632.Hold Reason: Unreviewed Transfer Orders.)    40 mg SC Every 24 Hours Scheduled --    04/01/21 1734  Pharmacy to Dose enoxaparin (LOVENOX)      -- XX Continuous PRN --                  Code Status -   Code Status and Medical Interventions:   Ordered at: 04/01/21 1709     Limited Support to NOT  Include:    Intubation     Level Of Support Discussed With:    Health Care Surrogate     Code Status:    No CPR     Medical Interventions (Level of Support Prior to Arrest):    Limited       This patient has been examined wearing appropriate Personal Protective Equipment and discussed with hospital infection control department. 04/04/21        Discharge Planning  pending        Electronically signed by Fei Bruner MD, 04/04/21, 12:32 EDT.  [facility] Hospitalist Team

## 2021-04-05 ENCOUNTER — APPOINTMENT (OUTPATIENT)
Dept: INTERVENTIONAL RADIOLOGY/VASCULAR | Facility: HOSPITAL | Age: 81
End: 2021-04-05

## 2021-04-05 VITALS
HEIGHT: 71 IN | HEART RATE: 83 BPM | TEMPERATURE: 98.8 F | SYSTOLIC BLOOD PRESSURE: 95 MMHG | BODY MASS INDEX: 20.19 KG/M2 | RESPIRATION RATE: 17 BRPM | DIASTOLIC BLOOD PRESSURE: 60 MMHG | OXYGEN SATURATION: 96 % | WEIGHT: 144.18 LBS

## 2021-04-05 LAB
ALBUMIN SERPL-MCNC: 2.6 G/DL (ref 3.5–5.2)
ALBUMIN/GLOB SERPL: 0.7 G/DL
ALP SERPL-CCNC: 73 U/L (ref 39–117)
ALT SERPL W P-5'-P-CCNC: 22 U/L (ref 1–41)
ANION GAP SERPL CALCULATED.3IONS-SCNC: 9 MMOL/L (ref 5–15)
AST SERPL-CCNC: 15 U/L (ref 1–40)
BASOPHILS # BLD AUTO: 0.1 10*3/MM3 (ref 0–0.2)
BASOPHILS NFR BLD AUTO: 1.1 % (ref 0–1.5)
BILIRUB SERPL-MCNC: <0.2 MG/DL (ref 0–1.2)
BUN SERPL-MCNC: 33 MG/DL (ref 8–23)
BUN/CREAT SERPL: 50.8 (ref 7–25)
CALCIUM SPEC-SCNC: 8.6 MG/DL (ref 8.6–10.5)
CHLORIDE SERPL-SCNC: 103 MMOL/L (ref 98–107)
CO2 SERPL-SCNC: 24 MMOL/L (ref 22–29)
CREAT SERPL-MCNC: 0.65 MG/DL (ref 0.76–1.27)
DEPRECATED RDW RBC AUTO: 42.4 FL (ref 37–54)
EOSINOPHIL # BLD AUTO: 0.3 10*3/MM3 (ref 0–0.4)
EOSINOPHIL NFR BLD AUTO: 2.2 % (ref 0.3–6.2)
ERYTHROCYTE [DISTWIDTH] IN BLOOD BY AUTOMATED COUNT: 15.5 % (ref 12.3–15.4)
GFR SERPL CREATININE-BSD FRML MDRD: 119 ML/MIN/1.73
GLOBULIN UR ELPH-MCNC: 3.6 GM/DL
GLUCOSE SERPL-MCNC: 151 MG/DL (ref 65–99)
HCT VFR BLD AUTO: 31.3 % (ref 37.5–51)
HGB BLD-MCNC: 10 G/DL (ref 13–17.7)
LYMPHOCYTES # BLD AUTO: 2.9 10*3/MM3 (ref 0.7–3.1)
LYMPHOCYTES NFR BLD AUTO: 25 % (ref 19.6–45.3)
MCH RBC QN AUTO: 24.4 PG (ref 26.6–33)
MCHC RBC AUTO-ENTMCNC: 32 G/DL (ref 31.5–35.7)
MCV RBC AUTO: 76.3 FL (ref 79–97)
MONOCYTES # BLD AUTO: 0.9 10*3/MM3 (ref 0.1–0.9)
MONOCYTES NFR BLD AUTO: 7.9 % (ref 5–12)
NEUTROPHILS NFR BLD AUTO: 63.8 % (ref 42.7–76)
NEUTROPHILS NFR BLD AUTO: 7.4 10*3/MM3 (ref 1.7–7)
NRBC BLD AUTO-RTO: 0 /100 WBC (ref 0–0.2)
PLATELET # BLD AUTO: 568 10*3/MM3 (ref 140–450)
PMV BLD AUTO: 7 FL (ref 6–12)
POTASSIUM SERPL-SCNC: 4.1 MMOL/L (ref 3.5–5.2)
PROT SERPL-MCNC: 6.2 G/DL (ref 6–8.5)
RBC # BLD AUTO: 4.1 10*6/MM3 (ref 4.14–5.8)
SODIUM SERPL-SCNC: 136 MMOL/L (ref 136–145)
WBC # BLD AUTO: 11.6 10*3/MM3 (ref 3.4–10.8)

## 2021-04-05 PROCEDURE — C1751 CATH, INF, PER/CENT/MIDLINE: HCPCS

## 2021-04-05 PROCEDURE — 02HV33Z INSERTION OF INFUSION DEVICE INTO SUPERIOR VENA CAVA, PERCUTANEOUS APPROACH: ICD-10-PCS | Performed by: FAMILY MEDICINE

## 2021-04-05 PROCEDURE — 25010000002 FENTANYL CITRATE (PF) 100 MCG/2ML SOLUTION: Performed by: RADIOLOGY

## 2021-04-05 PROCEDURE — 77001 FLUOROGUIDE FOR VEIN DEVICE: CPT

## 2021-04-05 PROCEDURE — 25010000003 LIDOCAINE 1 % SOLUTION: Performed by: RADIOLOGY

## 2021-04-05 PROCEDURE — 36558 INSERT TUNNELED CV CATH: CPT

## 2021-04-05 PROCEDURE — 0JH63XZ INSERTION OF TUNNELED VASCULAR ACCESS DEVICE INTO CHEST SUBCUTANEOUS TISSUE AND FASCIA, PERCUTANEOUS APPROACH: ICD-10-PCS | Performed by: FAMILY MEDICINE

## 2021-04-05 PROCEDURE — 80053 COMPREHEN METABOLIC PANEL: CPT | Performed by: INTERNAL MEDICINE

## 2021-04-05 PROCEDURE — 99239 HOSP IP/OBS DSCHRG MGMT >30: CPT | Performed by: FAMILY MEDICINE

## 2021-04-05 PROCEDURE — 25010000003 AMPICILLIN-SULBACTAM PER 1.5 G: Performed by: INTERNAL MEDICINE

## 2021-04-05 PROCEDURE — 76937 US GUIDE VASCULAR ACCESS: CPT

## 2021-04-05 PROCEDURE — 85025 COMPLETE CBC W/AUTO DIFF WBC: CPT | Performed by: FAMILY MEDICINE

## 2021-04-05 RX ORDER — LIDOCAINE HYDROCHLORIDE 10 MG/ML
INJECTION, SOLUTION INFILTRATION; PERINEURAL
Status: COMPLETED | OUTPATIENT
Start: 2021-04-05 | End: 2021-04-05

## 2021-04-05 RX ORDER — SACCHAROMYCES BOULARDII 250 MG
250 CAPSULE ORAL 2 TIMES DAILY
Qty: 60 CAPSULE | Refills: 0 | Status: SHIPPED | OUTPATIENT
Start: 2021-04-05 | End: 2021-05-05

## 2021-04-05 RX ORDER — ARGININE/GLUTAMINE/CALCIUM BMB 7G-7G-1.5G
1 POWDER IN PACKET (EA) ORAL 2 TIMES DAILY
Qty: 60 EACH | Refills: 0 | Status: SHIPPED | OUTPATIENT
Start: 2021-04-05 | End: 2021-05-05

## 2021-04-05 RX ORDER — FENTANYL CITRATE 50 UG/ML
INJECTION, SOLUTION INTRAMUSCULAR; INTRAVENOUS
Status: COMPLETED | OUTPATIENT
Start: 2021-04-05 | End: 2021-04-05

## 2021-04-05 RX ADMIN — FENTANYL CITRATE 50 MCG: 50 INJECTION, SOLUTION INTRAMUSCULAR; INTRAVENOUS at 15:46

## 2021-04-05 RX ADMIN — Medication 1 PACKET: at 11:39

## 2021-04-05 RX ADMIN — Medication 10 ML: at 09:00

## 2021-04-05 RX ADMIN — GABAPENTIN 600 MG: 300 CAPSULE ORAL at 09:03

## 2021-04-05 RX ADMIN — Medication 3 ML: at 09:00

## 2021-04-05 RX ADMIN — GUAIFENESIN 1200 MG: 600 TABLET, EXTENDED RELEASE ORAL at 09:02

## 2021-04-05 RX ADMIN — SODIUM CHLORIDE 3 G: 900 INJECTION INTRAVENOUS at 06:10

## 2021-04-05 RX ADMIN — SODIUM CHLORIDE 3 G: 900 INJECTION INTRAVENOUS at 10:29

## 2021-04-05 RX ADMIN — PANTOPRAZOLE SODIUM 40 MG: 40 TABLET, DELAYED RELEASE ORAL at 09:02

## 2021-04-05 RX ADMIN — DAKIN'S SOLUTION 0.125% (QUARTER STRENGTH): 0.12 SOLUTION at 11:32

## 2021-04-05 RX ADMIN — LIDOCAINE HYDROCHLORIDE 5 ML: 10 INJECTION, SOLUTION INFILTRATION; PERINEURAL at 15:49

## 2021-04-05 RX ADMIN — AMLODIPINE BESYLATE 10 MG: 5 TABLET ORAL at 09:02

## 2021-04-05 RX ADMIN — LIDOCAINE HYDROCHLORIDE 5 ML: 10 INJECTION, SOLUTION INFILTRATION; PERINEURAL at 15:50

## 2021-04-05 NOTE — PLAN OF CARE
Goal Outcome Evaluation:     Progress: no change   Patient rested comfortably in bed throughout the night without complaints of pain.  Wound care completed.  No new concerns at this time, will continue to monitor.

## 2021-04-05 NOTE — DISCHARGE SUMMARY
Date of Admission: 4/1/2021    Date of Discharge:  4/5/2021    Length of stay:  LOS: 4 days     Discharge Diagnosis:     Infected sacral decubitus ulcer    Presenting Problem List:    Infected pressure ulcer-patient appearing debilitated with significant sequelae from multiple sclerosis likely to have poor healing, also with poor nutrition.  Patient with surrounding cellulitic changes  -Wound care, patient spouse taught dressing changes  -Nutrition consult, continue Tunde supplementation outpatient  -IV antibiotics with Zosyn initially but wound growing staph aureus switch to vancomycin cefepime, then based on cultures to Unasyn  -ID consult, appreciate recommendation  -MRI of pelvis showing possible osteomyelitis near left greater trochanter  -General surgery consulted, status post wound debridement in the OR  -Inflammatory markers significantly elevated  -Blood cultures  1 out of 2 growing coag negative staph most likely contaminant  -Daily CBC  -Given signs of osteopatient will need to be on 6 weeks of IV antibiotics, PICC line placed     Positive blood culture-1 out of 2 growing coag negative staph most likely contaminant  -No further monitoring    Leukocytosis-due to underlying infected ulcer, normalizing  -Continue antibiotics     Thrombocytosis-likely reactive due to underlying infection, normalizing  -Monitor daily     Anemia-very mild may have small component of acute loss after wound debridement  -Monitor daily  -Patient appearing hemostatic at this time  -Continue monitoring outpatient     Multiple sclerosis-with significant sequelae including multiple contractures chronic debility and signs of wasting  -Continue home medications  -PT/OT  -Patient's wife reports patient has issues with his meals, has copious thick secretions with eating and inhibits his ability to tolerate p.o.  -Added Mucinex  -GI consult for esophageal dysmotility issues, status post EGD with dilation on 4/2/2021 doing well post  procedure, follow-up outpatient to discuss Botox in the future     Hypertension/GERD/depression-chronic in nature  -Resume home medication      CVA-patient previously reported, on antiplatelets  -Continue     Neurogenic bladder-due to MS  -Continue Matos cath    HPI/Hospital Course:  Mr. Singh is a 80 y.o. male with a past medical history of hypertension, CVA, hyperlipidemia, MS, and type 2 diabetes mellitus who was directly admitted to [facility] ED by Dr. Graham from [facility] wound care center on 4/1/2021 due to an necrotic sacral wound.  Hospital accepted the patient for further care management.  Per patient's wife this all started in December 2020 when she noticed a pressure ulcer on his sacrum.  She had home health coming out and eventually they decided he would need to go to the wound care center because it started to drain yellow discharge and have a foul odor.  Patient started to go to the wound care center 1 month ago and was supposed to follow-up last Thursday, however had a fever so he did not follow-up at that time.  Patient's wife was instructed to bring patient to the ED if his fever got above 101.0.  She ended up bringing him to the wound care center today for follow-up.  She reports he has had multiple pressure ulcers in the past that were just red spots, but would eventually heal.  She explains he has a neurogenic bladder and has a chronic Matos catheter.  She also explains that he has bowel incontinence and she feeds him his meals due to current contracted arms.  Patient was able to deny he is in pain.  Patient is currently alert and oriented x3. ID and general surgery were consulted upon admission. Patient was started on empiric antibiotics and wound culture was collected at the Wound care center.      4/2/2021: Patient seen postoperatively this morning after wound debridement.  Patient somnolent but arousable.  Patient's wife at bedside, patient with no obvious concerns at this  time.  Patient with significant weight loss and debility, wife is reporting that he has had long history of dysphagia associated with his MS and has multiple dilations reported more issues with food as of late wanted to see gastroenterology.  GI to be consulted.     4/3/2021: Patient seen by gastroenterology yesterday underwent EGD with dilation.  Possible discussion of Botox in the future by GI.  Patient tolerated procedure well.  Patient also seen by infectious disease, continuing broad-spectrum antibiotics until cultures return.  Today patient much more awake and alert denies any pain or shortness of breath.  Patient meeting with nutrition in order to address malnutrition secondary to progressive MS.    4/4/2021:Patient with no new concerns.  No chest pain or shortness of breath.  Wound culture growing Enterococcus and staph aureus, antibiotics to be adjusted.    4/5/2021: Patient with no new symptoms.  Antibiotics changed to Unasyn to continue for total of 6 weeks.  Patient being set up with PICC line.  Home health set up for antibiotic infusion.  Patient spouse taught wound care.  Follow-up organized primary care and patient's general surgeon.  Patient able to be discharged home with home health in good condition with strict return precautions given.    Procedures Performed    Procedure(s):  DEBRIDEMENT OF ISCHEAL ULCER/BUTTOCKS WOUND  -------------------    Procedure(s):  ESOPHAGOGASTRODUODENOSCOPY with balloon dilatation to 18cm at the GE junction and pyloris  -------------------       Consults:   Consults     Date and Time Order Name Status Description    4/2/2021 11:17 AM Inpatient Gastroenterology Consult      4/1/2021  5:01 PM Inpatient General Surgery Consult Completed     4/1/2021  4:59 PM Inpatient Infectious Diseases Consult Completed           Pertinent Test Results:     Lab Results (last 24 hours)     Procedure Component Value Units Date/Time    Comprehensive Metabolic Panel [[id number]]  (Abnormal)  Collected: 04/05/21 0403    Specimen: Blood Updated: 04/05/21 0438     Glucose 151 mg/dL      BUN 33 mg/dL      Creatinine 0.65 mg/dL      Sodium 136 mmol/L      Potassium 4.1 mmol/L      Comment: Slight hemolysis detected by analyzer. Results may be affected.        Chloride 103 mmol/L      CO2 24.0 mmol/L      Calcium 8.6 mg/dL      Total Protein 6.2 g/dL      Albumin 2.60 g/dL      ALT (SGPT) 22 U/L      AST (SGOT) 15 U/L      Alkaline Phosphatase 73 U/L      Total Bilirubin <0.2 mg/dL      eGFR Non African Amer 119 mL/min/1.73      Globulin 3.6 gm/dL      A/G Ratio 0.7 g/dL      BUN/Creatinine Ratio 50.8     Anion Gap 9.0 mmol/L     Narrative:      GFR Normal >60  Chronic Kidney Disease <60  Kidney Failure <15      CBC & Differential [[id number]]  (Abnormal) Collected: 04/05/21 0402    Specimen: Blood Updated: 04/05/21 0415    Narrative:      The following orders were created for panel order CBC & Differential.  Procedure                               Abnormality         Status                     ---------                               -----------         ------                     CBC Auto Differential[[id number]]        Abnormal            Final result                 Please view results for these tests on the individual orders.    CBC Auto Differential [[id number]]  (Abnormal) Collected: 04/05/21 0402    Specimen: Blood Updated: 04/05/21 0415     WBC 11.60 10*3/mm3      RBC 4.10 10*6/mm3      Hemoglobin 10.0 g/dL      Hematocrit 31.3 %      MCV 76.3 fL      MCH 24.4 pg      MCHC 32.0 g/dL      RDW 15.5 %      RDW-SD 42.4 fl      MPV 7.0 fL      Platelets 568 10*3/mm3      Neutrophil % 63.8 %      Lymphocyte % 25.0 %      Monocyte % 7.9 %      Eosinophil % 2.2 %      Basophil % 1.1 %      Neutrophils, Absolute 7.40 10*3/mm3      Lymphocytes, Absolute 2.90 10*3/mm3      Monocytes, Absolute 0.90 10*3/mm3      Eosinophils, Absolute 0.30 10*3/mm3      Basophils, Absolute 0.10 10*3/mm3      nRBC 0.0 /100 WBC      Blood Culture - Blood, Arm, Right [[id number]] Collected: 04/01/21 1715    Specimen: Blood from Arm, Right Updated: 04/04/21 1745     Blood Culture No growth at 3 days          Microbiology Results Abnormal     Procedure Component Value - Date/Time    Blood Culture - Blood, Arm, Right [[id number]] Collected: 04/01/21 1715    Lab Status: Preliminary result Specimen: Blood from Arm, Right Updated: 04/04/21 1745     Blood Culture No growth at 3 days    Blood Culture - Blood, Arm, Left [[id number]]  (Abnormal) Collected: 04/01/21 1725    Lab Status: Final result Specimen: Blood from Arm, Left Updated: 04/03/21 0811     Blood Culture Staphylococcus, coagulase negative     Comment: Probable contaminant requires clinical correlation, susceptibility not performed unless requested by physician.          Gram Stain Anaerobic Bottle Gram positive cocci in clusters      Aerobic Bottle Gram positive cocci in clusters    Blood Culture ID, PCR - Blood, Arm, Left [[id number]]  (Abnormal) Collected: 04/01/21 1725    Lab Status: Final result Specimen: Blood from Arm, Left Updated: 04/02/21 1303     BCID, PCR Staphylococcus spp, not aureus. Identification by BCID PCR.     BOTTLE TYPE Anaerobic Bottle    MRSA Screen, PCR (Inpatient) - Swab, Nares [[id number]]  (Normal) Collected: 04/01/21 2200    Lab Status: Final result Specimen: Swab from Nares Updated: 04/02/21 0056     MRSA PCR No MRSA Detected    COVID PRE-OP / PRE-PROCEDURE SCREENING ORDER (NO ISOLATION) - Swab, Nasopharynx [[id number]]  (Normal) Collected: 04/01/21 2022    Lab Status: Final result Specimen: Swab from Nasopharynx Updated: 04/01/21 2217    Narrative:      The following orders were created for panel order COVID PRE-OP / PRE-PROCEDURE SCREENING ORDER (NO ISOLATION) - Swab, Nasopharynx.  Procedure                               Abnormality         Status                     ---------                               -----------         ------                      COVID-19,CEPHEID,COR/ALFIE...[[id number]]  Normal              Final result                 Please view results for these tests on the individual orders.    COVID-19,CEPHEID,COR/ALFIE/PAD IN-HOUSE(OR EMERGENT/ADD-ON),NP SWAB IN TRANSPORT MEDIA 3-4 HR TAT, RT-PCR - Swab, Nasopharynx [[id number]]  (Normal) Collected: 04/01/21 2022    Lab Status: Final result Specimen: Swab from Nasopharynx Updated: 04/01/21 2217     COVID19 Not Detected    Narrative:      Fact sheet for providers: https://www.fda.gov/media/435810/download     Fact sheet for patients: https://www.fda.gov/media/474229/download        No radiology results for the last day          Condition on Discharge:  Good    Vital Signs  Temp:  [98.3 °F (36.8 °C)-98.6 °F (37 °C)] 98.3 °F (36.8 °C)  Heart Rate:  [80-90] 84  Resp:  [16-18] 18  BP: (105-148)/(63-82) 110/66    Physical Exam:    General: Frail elderly male lying in bed breathing comfortably on room air no acute distress  HEENT: NC/AT, EOMI, mucosa moist  Heart: Regular, rate controlled  Chest: Normal work of breathing, moving air well no wheezing  Abdominal: Soft. NT/ND.   Musculoskeletal: Diminished range of motion, arm contractures noted bilaterally.  No edema. No calf tenderness.  Neurological: Awake and alert, spasticity noted  Skin: Skin is warm and dry.  Unstageable ulcer present, wound dressing clean dry and intact  Psychiatric: Normal mood and affect.       [place] [facility] [id number]    Discharge Medications     Discharge Medications      New Medications      Instructions Start Date   ampicillin-sulbactam (UNASYN) 3 gm IVPB in 100 mL NS (MBP)   3 g, Intravenous, Every 6 Hours      Tunde pack   1 packet, Oral, 2 Times Daily      saccharomyces boulardii 250 MG capsule  Commonly known as: Florastor   250 mg, Oral, 2 Times Daily         Continue These Medications      Instructions Start Date   amLODIPine 10 MG tablet  Commonly known as: NORVASC   1 tablet, Oral, Daily       aspirin-dipyridamole  MG per 12 hr capsule  Commonly known as: AGGRENOX   1 capsule, Oral, Every 12 Hours      gabapentin 600 MG tablet  Commonly known as: NEURONTIN   1 tablet, Oral, 2 times daily      omeprazole 20 MG capsule  Commonly known as: priLOSEC   1 capsule, Oral, Daily      sertraline 50 MG tablet  Commonly known as: ZOLOFT   50 mg, Oral, Every Night at Bedtime             Discharge Diet:   Diet Instructions     Diet: Regular      Discharge Diet: Regular          Activity at Discharge:   Activity Instructions     Activity as Tolerated            Follow-up Appointments  No future appointments.  Additional Instructions for the Follow-ups that You Need to Schedule     Ambulatory Referral to Home Health   As directed      Face to Face Visit Date: 4/2/2021    Follow-up provider for Plan of Care?: I treated the patient in an acute care facility and will not continue treatment after discharge.    Follow-up provider: LELAND SANTO [[id number]]    Reason/Clinical Findings: resume    Describe mobility limitations that make leaving home difficult: tires easliy    Nursing/Therapeutic Services Requested: Other (treta and eval. resume)    Frequency: 1 Week 1         Discharge Follow-up with PCP   As directed       Currently Documented PCP:    Leland Santo MD    PCP Phone Number:    [phone]     Follow Up Details: Please follow-up with your primary care doctor within a week to recheck blood pressure and tolerance to antibiotics         Discharge Follow-up with Specified Provider: Please follow-up with your surgeon in 2 weeks to reevaluate wound site   As directed      To: Please follow-up with your surgeon in 2 weeks to reevaluate wound site               Test Results Pending at Discharge  Pending Labs     Order Current Status    Blood Culture - Blood, Arm, Right Preliminary result           Risk for Readmission (LACE) Score: 8 (4/5/2021  6:01 AM)        Fei Bruner MD  04/05/21  10:11  EDT      Time: Discharge 35 min total time spent with face-to-face history/exam, writing all prescriptions, preparing medication reconciliation, and documenting discharge data including chart review of the full admission, care co-ordination with patient, family, , and , etc., and follow-up appointments with PCP and specialists as recommended.

## 2021-04-05 NOTE — CONSULTS
Unsuccessful placement of SL PICC to right cephalic vein.    Upon assessment of patient, left upper extremity slightly contracted, painful to lay out to side and rotate out, and patient is unable to move extremity on own. Potential increased risk for DVT.     Assessed right upper extremity, able to straighten arm out and down to side but unable to lift at shoulder and unable to visualize brachial and basilic veins with US. Only accessible vein is the right cephalic.    TL PICC attempted to right cephalic vein via US guidance, vein easily accessed but unable to advance guidewire past axilla. Patient unable to move/raise arm out to side. Attempted to thread PICC through dilator but would not thread more than 10cm. Attempt terminated.    Would recommend pt to IR for tunneled PICC line.

## 2021-04-05 NOTE — PROGRESS NOTES
Infectious Diseases Progress Note      LOS: 4 days   Patient Care Team:  Leland Santo MD as PCP - General  Jessenia Pederson RN as Registered Nurse    Chief Complaint: Wound    Subjective       The patient has been afebrile for the last 24 hours.  The patient is on room air, hemodynamically stable, and is tolerating antimicrobial therapy.      Review of Systems:   Review of Systems   Unable to perform ROS: Acuity of condition   Skin: Positive for wound.        Objective     Vital Signs  Temp:  [98.3 °F (36.8 °C)-98.8 °F (37.1 °C)] 98.8 °F (37.1 °C)  Heart Rate:  [80-90] 83  Resp:  [16-18] 17  BP: ()/(60-82) 95/60    Physical Exam:  Physical Exam  Vitals and nursing note reviewed.   Constitutional:       General: He is not in acute distress.     Appearance: He is well-developed and normal weight. He is not diaphoretic.   HENT:      Head: Normocephalic and atraumatic.   Eyes:      Extraocular Movements: Extraocular movements intact.      Conjunctiva/sclera: Conjunctivae normal.      Pupils: Pupils are equal, round, and reactive to light.   Cardiovascular:      Rate and Rhythm: Normal rate and regular rhythm.      Heart sounds: Normal heart sounds, S1 normal and S2 normal.   Pulmonary:      Effort: Pulmonary effort is normal. No respiratory distress.      Breath sounds: Normal breath sounds. No stridor. No wheezing or rales.   Abdominal:      General: Bowel sounds are normal. There is no distension.      Palpations: Abdomen is soft. There is no mass.      Tenderness: There is no abdominal tenderness. There is no guarding.   Genitourinary:     Comments: Matos catheter  Musculoskeletal:         General: No deformity.      Cervical back: Neck supple.      Comments: Contractures of upper extremities   Skin:     General: Skin is warm and dry.      Coloration: Skin is not pale.      Findings: No erythema or rash.      Comments: Sacral wound is currently packed   Neurological:      Mental Status: He is alert.       Cranial Nerves: No cranial nerve deficit.      Comments: Alert and awake          Results Review:    I have reviewed all clinical data, test, lab, and imaging results.     Radiology  No Radiology Exams Resulted Within Past 24 Hours    Cardiology    Laboratory    Results from last 7 days   Lab Units 04/05/21  0402 04/04/21 0232 04/03/21 0346 04/02/21  0338 04/01/21  1725   WBC 10*3/mm3 11.60* 11.60* 11.50* 12.00* 12.60*   HEMOGLOBIN g/dL 10.0* 10.6* 12.1* 11.6* 13.9   HEMATOCRIT % 31.3* 32.2* 37.7 36.3* 43.9   PLATELETS 10*3/mm3 568* 512* 497* 497* 508*     Results from last 7 days   Lab Units 04/05/21  0403 04/04/21  0232 04/03/21  0346 04/02/21  0338 04/01/21  1725   SODIUM mmol/L 136 138 137 138 142   POTASSIUM mmol/L 4.1 4.3 4.8 4.0 4.4   CHLORIDE mmol/L 103 105 104 102 104   CO2 mmol/L 24.0 22.0 24.0 24.0 26.0   BUN mg/dL 33* 32* 25* 23 26*   CREATININE mg/dL 0.65* 0.79 0.79 0.79 0.96   GLUCOSE mg/dL 151* 90 123* 99 145*   ALBUMIN g/dL 2.60*  --  2.90* 2.90* 3.60   BILIRUBIN mg/dL <0.2  --  0.2 0.2 0.3   ALK PHOS U/L 73  --  56 59 69   AST (SGOT) U/L 15  --  24 20 21   ALT (SGPT) U/L 22  --  23 24 30   CALCIUM mg/dL 8.6 8.3* 8.3* 8.4* 9.0     Results from last 7 days   Lab Units 04/04/21  0232   CK TOTAL U/L 40     Results from last 7 days   Lab Units 04/03/21  0346   SED RATE mm/hr 120*         Microbiology   Microbiology Results (last 10 days)     Procedure Component Value - Date/Time    MRSA Screen, PCR (Inpatient) - Swab, Nares [[id number]]  (Normal) Collected: 04/01/21 2200    Lab Status: Final result Specimen: Swab from Nares Updated: 04/02/21 0056     MRSA PCR No MRSA Detected    COVID PRE-OP / PRE-PROCEDURE SCREENING ORDER (NO ISOLATION) - Swab, Nasopharynx [[id number]]  (Normal) Collected: 04/01/21 2022    Lab Status: Final result Specimen: Swab from Nasopharynx Updated: 04/01/21 2217    Narrative:      The following orders were created for panel order COVID PRE-OP / PRE-PROCEDURE SCREENING ORDER (NO  ISOLATION) - Swab, Nasopharynx.  Procedure                               Abnormality         Status                     ---------                               -----------         ------                     COVID-19,CEPHEID,COR/ALFIE...[[id number]]  Normal              Final result                 Please view results for these tests on the individual orders.    COVID-19,CEPHEID,COR/ALFIE/PAD IN-HOUSE(OR EMERGENT/ADD-ON),NP SWAB IN TRANSPORT MEDIA 3-4 HR TAT, RT-PCR - Swab, Nasopharynx [[id number]]  (Normal) Collected: 04/01/21 2022    Lab Status: Final result Specimen: Swab from Nasopharynx Updated: 04/01/21 2217     COVID19 Not Detected    Narrative:      Fact sheet for providers: https://www.fda.gov/media/155982/download     Fact sheet for patients: https://www.fda.gov/media/550036/download    Blood Culture - Blood, Arm, Left [[id number]]  (Abnormal) Collected: 04/01/21 1725    Lab Status: Final result Specimen: Blood from Arm, Left Updated: 04/03/21 0811     Blood Culture Staphylococcus, coagulase negative     Comment: Probable contaminant requires clinical correlation, susceptibility not performed unless requested by physician.          Gram Stain Anaerobic Bottle Gram positive cocci in clusters      Aerobic Bottle Gram positive cocci in clusters    Blood Culture ID, PCR - Blood, Arm, Left [[id number]]  (Abnormal) Collected: 04/01/21 1725    Lab Status: Final result Specimen: Blood from Arm, Left Updated: 04/02/21 1303     BCID, PCR Staphylococcus spp, not aureus. Identification by BCID PCR.     BOTTLE TYPE Anaerobic Bottle    Blood Culture - Blood, Arm, Right [[id number]] Collected: 04/01/21 1715    Lab Status: Preliminary result Specimen: Blood from Arm, Right Updated: 04/04/21 1745     Blood Culture No growth at 3 days    Wound Culture - Wound, Coccyx [[id number]]  (Abnormal)  (Susceptibility) Collected: 04/01/21 1420    Lab Status: Final result Specimen: Wound from Coccyx Updated: 04/04/21 0845     Wound Culture Light  growth (2+) Enterococcus faecalis      Moderate growth (3+) Staphylococcus aureus      Moderate growth (3+) Normal Skin Leatha     Gram Stain No organisms seen    Susceptibility      Enterococcus faecalis Staphylococcus aureus      MARK MARK      Ampicillin Susceptible       Clindamycin  Resistant      Erythromycin  Resistant      Inducible Clindamycin Resistance  Positive      Oxacillin  Susceptible      Penicillin G  Resistant      Rifampin  Susceptible      Tetracycline  Susceptible      Trimethoprim + Sulfamethoxazole  Susceptible      Vancomycin Susceptible Susceptible                 Linear View               Susceptibility Comments     Staphylococcus aureus    This isolate is presumed to be clindamycin resistant based on detection of inducible clindamycin resistance.  Clindamycin may still be effective in some patients.                   Medication Review:       Schedule Meds  amLODIPine, 10 mg, Oral, Daily  ampicillin-sulbactam, 3 g, Intravenous, Q6H  enoxaparin, 40 mg, Subcutaneous, Q24H  gabapentin, 600 mg, Oral, Q12H  guaiFENesin, 1,200 mg, Oral, Q12H  Tunde, 1 packet, Oral, BID  pantoprazole, 40 mg, Oral, QAM AC  sertraline, 50 mg, Oral, Nightly  sodium chloride, 10 mL, Intravenous, Q12H  sodium chloride, 3 mL, Intravenous, Q12H  sodium hypochlorite, , Topical, BID        Infusion Meds  Pharmacy to Dose enoxaparin (LOVENOX),         PRN Meds  •  acetaminophen **OR** acetaminophen **OR** acetaminophen  •  hydrALAZINE  •  HYDROcodone-acetaminophen **OR** HYDROcodone-acetaminophen  •  magnesium sulfate **OR** magnesium sulfate in D5W 1g/100mL (PREMIX)  •  ondansetron **OR** ondansetron  •  Pharmacy to Dose enoxaparin (LOVENOX)  •  potassium chloride  •  sodium chloride  •  sodium chloride        Assessment/Plan       Antimicrobial Therapy   1.  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  The patient is s/p surgical debridement on April 4, 2021  MRI of the pelvis show  osteomyelitis of the sacrum  -Cultures grew methicillin susceptible Staphylococcus aureus and Enterococcus faecalis.  The last organism was susceptible to vancomycin and ampicillin     The patient is bedridden for 10 years secondary to multiple sclerosis     Chronic indwelling Matos catheter.  Catheter had been replaced a week ago    1 out of 2 blood cultures is growing coagulase-negative Staphylococcus-this is likely contamination     Plan     Continue Unasyn 3 g IV every 6 hours for 6 weeks  The patient will need weekly labs including CBC, CMP and sed rate for 6 weeks  May discharge home soon after setting up home IV antibiotics.  The patient will need PICC line before discharge and recommend to remove PICC line after 6 weeks  Case was discussed with the patient's wife at the bedside    Please fax all post discharge lab results, imaging studies and correspondence to this fax number [phone]  For any question or concern please contact our service number [phone]       Marck Hurley MD  04/05/21  14:16 EDT     Note is dictated utilizing voice recognition software/Dragon

## 2021-04-05 NOTE — DISCHARGE PLACEMENT REQUEST
"Floyd Singh (80 y.o. Male)     Date of Birth Social Security Number Address Home Phone MRN    1940  [address] [place] [phone] [id number]    Shinto Marital Status          Latter-day        Admission Date Admission Type Admitting Provider Attending Provider Department, Room/Bed    4/1/21 Urgent Fei Bruner MD Farley, Timothy Michael, MD [facility] 3C MEDICAL INPATIENT, 367/1    Discharge Date Discharge Disposition Discharge Destination                       Attending Provider: Fei Bruner MD    Allergies: No Known Allergies    Isolation: None   Infection: None   Code Status: No CPR    Ht: 180.3 cm (71\")   Wt: 65.4 kg (144 lb 2.9 oz)    Admission Cmt: None   Principal Problem: Sacral pressure ulcer [L89.159]                 Active Insurance as of 4/1/2021     Primary Coverage     Payor Plan Insurance Group Employer/Plan Group    MEDICARE MEDICARE A & B      Payor Plan Address Payor Plan Phone Number Payor Plan Fax Number Effective Dates    [address] [phone]  12/1/1996 - None Entered    [facility] [id number]       Subscriber Name Subscriber Birth Date Member ID       FLOYD SINGH 1940 [id number]           Secondary Coverage     Payor Plan Insurance Group Employer/Plan Group    [place] [id number]     Payor Plan Address Payor Plan Phone Number Payor Plan Fax Number Effective Dates    [address]   1/1/2016 - None Entered    [facility] [id number]       Subscriber Name Subscriber Birth Date Member ID       FLOYD SINGH 1940 [id number]                 Emergency Contacts      (Rel.) Home Phone Work Phone Mobile Phone    SINGHSETH (Spouse) [phone] -- --              "

## 2021-04-05 NOTE — PROGRESS NOTES
Discharge Planning Assessment   [facility]     Patient Name: Vicente Singh  MRN: [id number]  Today's Date: 4/5/2021    Admit Date: 4/1/2021    Discharge Needs Assessment     Row Name 04/05/21 1220       Living Environment    Lives With  spouse    Current Living Arrangements  home/apartment/condo    Potentially Unsafe Housing Conditions  unable to assess    Primary Care Provided by  spouse/significant other    Provides Primary Care For  no one, unable/limited ability to care for self    Family Caregiver if Needed  spouse    Quality of Family Relationships  unable to assess    Able to Return to Prior Arrangements  yes       Resource/Environmental Concerns    Resource/Environmental Concerns  none       Transition Planning    Patient/Family Anticipates Transition to  home with family;home with help/services    Patient/Family Anticipated Services at Transition      Transportation Anticipated  family or friend will provide       Discharge Needs Assessment    Equipment Currently Used at Home  wheelchair;hospital bed;lift device    Concerns to be Addressed  discharge planning        Discharge Plan     Row Name 04/05/21 1227       Plan    Plan Comments  [facility] iv antibiotics can be proveded; primary nurse notified    Row Name 04/05/21 1222       Plan    Plan  home with [facility] at discharge    Patient/Family in Agreement with Plan  yes    Plan Comments  spoke to patient and spouse brenda in room with ppe(mask and goggles) staying 6 feet away and less than 15 mins; spouse states she care for patient at home with [facility]; [facility]; they have had [facility] for iv medications in the past so referral to them for iv antibiotics at discharge;        Continued Care and Services - Admitted Since 4/1/2021     Dialysis/Infusion     Service Provider Request Status Selected Services Address Phone Fax  Patient Preferred    [facility]   Selected Infusion and IV Therapy [id number] [id number] [phone] --               General Information    Arrived From  emergency department    Required Notices Provided  Important Message from Medicare    Preferred Language  English     Used During This Interaction  no    Row Name 04/05/21 1155       General Information    Admission Type  inpatient    Arrived From  emergency department        Functional Status     Row Name 04/05/21 1218       Functional Status    Usual Activity Tolerance  poor    Current Activity Tolerance  poor       Functional Status, IADL    Medications  assistive person    Meal Preparation  assistive person         Patient Forms    Important Message from Medicare ([facility])  Delivered per documentation im letter 04/01            Carol naegele rn  Case management  Office number [phone]  Cell phone [phone]

## 2021-04-06 ENCOUNTER — READMISSION MANAGEMENT (OUTPATIENT)
Dept: CALL CENTER | Facility: HOSPITAL | Age: 81
End: 2021-04-06

## 2021-04-06 LAB — BACTERIA SPEC AEROBE CULT: NORMAL

## 2021-04-06 NOTE — OUTREACH NOTE
Prep Survey      Responses   [facility] patient discharged from?  [facility]   Is LACE score < 7 ?  No   Emergency Room discharge w/ pulse ox?  No   Eligibility  Readm Mgmt   Discharge diagnosis  Infected sacral decubitus ulcer,  debridement   Does the patient have one of the following disease processes/diagnoses(primary or secondary)?  General Surgery   Does the patient have Home health ordered?  Yes   What is the Home health agency?   [facility] for [facility]   Is there a DME ordered?  No   Prep survey completed?  Yes          Naomi Yoon RN

## 2021-04-06 NOTE — PROGRESS NOTES
Discharge Planning Assessment   [facility]     Patient Name: Vicente Singh  MRN: [id number]  Today's Date: 4/6/2021    Admit Date: 4/1/2021          Plan    Final Discharge Disposition Code  06 - home with home health care    Final Note  home with [facility]       Carol naegele rn  Case management  Office number [phone]  Cell phone [phone]

## 2021-04-09 ENCOUNTER — READMISSION MANAGEMENT (OUTPATIENT)
Dept: CALL CENTER | Facility: HOSPITAL | Age: 81
End: 2021-04-09

## 2021-04-09 NOTE — OUTREACH NOTE
General Surgery Week 1 Survey      Responses   [facility] patient discharged from?  [facility]   Does the patient have one of the following disease processes/diagnoses(primary or secondary)?  General Surgery   Week 1 attempt successful?  Yes   Call start time  1102   Call end time  1106   Discharge diagnosis  Infected sacral decubitus ulcer,  debridement   Is patient permission given to speak with other caregiver?  Yes   List who call center can speak with  Radha   Person spoke with today (if not patient) and relationship  Radha   Medication alerts for this patient  IV antbx   Meds reviewed with patient/caregiver?  Yes   Is the patient having any side effects they believe may be caused by any medication additions or changes?  No   Does the patient have all medications related to this admission filled (includes all antibiotics, pain medications, etc.)  Yes   Is the patient taking all medications as directed (includes completed medication regime)?  Yes   Medication comments  Has a van for appointment   Does the patient have a follow up appointment scheduled with their surgeon?  Yes   Has the patient kept scheduled appointments due by today?  N/A   What is the Home health agency?   [facility]   Has home health visited the patient within 72 hours of discharge?  Yes   Psychosocial issues?  No   Comments  Wife assists with care   Did the patient receive a copy of their discharge instructions?  Yes   Nursing interventions  Reviewed instructions with patient   What is the patient's perception of their health status since discharge?  Improving   Nursing interventions  Nurse provided patient education   Is the patient /caregiver able to teach back basic post-op care?  Keep incision areas clean,dry and protected   Is the patient/caregiver able to teach back signs and symptoms of incisional infection?  Increased redness, swelling or pain at the incisonal site, Increased drainage or bleeding,  Incisional warmth, Pus or odor from incision, Fever   Is the patient/caregiver able to teach back steps to recovery at home?  Rest and rebuild strength, gradually increase activity, Eat a well-balance diet   Is the patient/caregiver able to teach back the hierarchy of who to call/visit for symptoms/problems? PCP, Specialist, Home health nurse, Urgent Care, ED, 911  Yes   Additional teach back comments  Wife states sacral decub is very large. She is changing the dressing and  assists also.   Week 1 call completed?  Yes          Marychuy Terrell, RN

## 2021-04-12 ENCOUNTER — LAB REQUISITION (OUTPATIENT)
Dept: LAB | Facility: HOSPITAL | Age: 81
End: 2021-04-12

## 2021-04-12 DIAGNOSIS — L89.150 PRESSURE ULCER OF SACRAL REGION, UNSTAGEABLE (HCC): ICD-10-CM

## 2021-04-12 LAB
ALBUMIN SERPL-MCNC: 3.3 G/DL (ref 3.5–5.2)
ALBUMIN/GLOB SERPL: 0.9 G/DL
ALP SERPL-CCNC: 70 U/L (ref 39–117)
ALT SERPL W P-5'-P-CCNC: 8 U/L (ref 1–41)
ANION GAP SERPL CALCULATED.3IONS-SCNC: 9.6 MMOL/L (ref 5–15)
AST SERPL-CCNC: 13 U/L (ref 1–40)
BASOPHILS # BLD AUTO: 0.12 10*3/MM3 (ref 0–0.2)
BASOPHILS NFR BLD AUTO: 1 % (ref 0–1.5)
BILIRUB SERPL-MCNC: 0.2 MG/DL (ref 0–1.2)
BUN SERPL-MCNC: 43 MG/DL (ref 8–23)
BUN/CREAT SERPL: 55.8 (ref 7–25)
CALCIUM SPEC-SCNC: 8.9 MG/DL (ref 8.6–10.5)
CHLORIDE SERPL-SCNC: 104 MMOL/L (ref 98–107)
CO2 SERPL-SCNC: 29.4 MMOL/L (ref 22–29)
CREAT SERPL-MCNC: 0.77 MG/DL (ref 0.76–1.27)
DEPRECATED RDW RBC AUTO: 40.7 FL (ref 37–54)
EOSINOPHIL # BLD AUTO: 0.38 10*3/MM3 (ref 0–0.4)
EOSINOPHIL NFR BLD AUTO: 3.1 % (ref 0.3–6.2)
ERYTHROCYTE [DISTWIDTH] IN BLOOD BY AUTOMATED COUNT: 15 % (ref 12.3–15.4)
GFR SERPL CREATININE-BSD FRML MDRD: 98 ML/MIN/1.73
GLOBULIN UR ELPH-MCNC: 3.6 GM/DL
GLUCOSE SERPL-MCNC: 134 MG/DL (ref 65–99)
HCT VFR BLD AUTO: 32.7 % (ref 37.5–51)
HGB BLD-MCNC: 10.5 G/DL (ref 13–17.7)
IMM GRANULOCYTES # BLD AUTO: 0.06 10*3/MM3 (ref 0–0.05)
IMM GRANULOCYTES NFR BLD AUTO: 0.5 % (ref 0–0.5)
LYMPHOCYTES # BLD AUTO: 2.12 10*3/MM3 (ref 0.7–3.1)
LYMPHOCYTES NFR BLD AUTO: 17.2 % (ref 19.6–45.3)
MCH RBC QN AUTO: 24.5 PG (ref 26.6–33)
MCHC RBC AUTO-ENTMCNC: 32.1 G/DL (ref 31.5–35.7)
MCV RBC AUTO: 76.4 FL (ref 79–97)
MONOCYTES # BLD AUTO: 1.05 10*3/MM3 (ref 0.1–0.9)
MONOCYTES NFR BLD AUTO: 8.5 % (ref 5–12)
NEUTROPHILS NFR BLD AUTO: 69.7 % (ref 42.7–76)
NEUTROPHILS NFR BLD AUTO: 8.56 10*3/MM3 (ref 1.7–7)
NRBC BLD AUTO-RTO: 0 /100 WBC (ref 0–0.2)
PLATELET # BLD AUTO: 532 10*3/MM3 (ref 140–450)
PMV BLD AUTO: 9.4 FL (ref 6–12)
POTASSIUM SERPL-SCNC: 3.8 MMOL/L (ref 3.5–5.2)
PROT SERPL-MCNC: 6.9 G/DL (ref 6–8.5)
RBC # BLD AUTO: 4.28 10*6/MM3 (ref 4.14–5.8)
SODIUM SERPL-SCNC: 143 MMOL/L (ref 136–145)
WBC # BLD AUTO: 12.29 10*3/MM3 (ref 3.4–10.8)

## 2021-04-12 PROCEDURE — 85025 COMPLETE CBC W/AUTO DIFF WBC: CPT | Performed by: PHYSICAL MEDICINE & REHABILITATION

## 2021-04-12 PROCEDURE — 80053 COMPREHEN METABOLIC PANEL: CPT | Performed by: PHYSICAL MEDICINE & REHABILITATION

## 2021-04-12 NOTE — ANESTHESIA POSTPROCEDURE EVALUATION
Patient: Vicente Singh    Procedure Summary     Date: 04/02/21 Room / Location: [facility] ENDOSCOPY 1 / [facility] ENDOSCOPY    Anesthesia Start: 1436 Anesthesia Stop: 1456    Procedure: ESOPHAGOGASTRODUODENOSCOPY with balloon dilatation to 18cm at the GE junction and pyloris (N/A ) Diagnosis:       Dysphagia, unspecified type      (Dysphagia, unspecified type [R13.10])    Surgeons: Shanda Drew MD Provider: Marcellus Araujo MD    Anesthesia Type: MAC ASA Status: 4          Anesthesia Type: MAC    Vitals  Vitals Value Taken Time   /66 04/02/21 1515   Temp     Pulse 82 04/02/21 1515   Resp 15 04/02/21 1515   SpO2 91 % 04/02/21 1515           Post Anesthesia Care and Evaluation    Patient location during evaluation: PACU  Patient participation: complete - patient participated  Level of consciousness: awake  Pain scale: See nurse's notes for pain score.  Pain management: adequate  Airway patency: patent  Anesthetic complications: No anesthetic complications  PONV Status: none  Cardiovascular status: acceptable  Respiratory status: acceptable  Hydration status: acceptable    Comments: Patient seen and examined postoperatively; vital signs stable; SpO2 greater than or equal to 90%; cardiopulmonary status stable; nausea/vomiting adequately controlled; pain adequately controlled; no apparent anesthesia complications; patient discharged from anesthesia care when discharge criteria were met

## 2021-04-17 ENCOUNTER — READMISSION MANAGEMENT (OUTPATIENT)
Dept: CALL CENTER | Facility: HOSPITAL | Age: 81
End: 2021-04-17

## 2021-04-17 NOTE — OUTREACH NOTE
General Surgery Week 2 Survey      Responses   [facility] patient discharged from?  [facility]   Does the patient have one of the following disease processes/diagnoses(primary or secondary)?  General Surgery   Week 2 attempt successful?  Yes   Call start time  1613   Call end time  1617   Discharge diagnosis  Infected sacral decubitus ulcer,  debridement   Person spoke with today (if not patient) and relationship  gurjit Garcia   Meds reviewed with patient/caregiver?  Yes   Is the patient having any side effects they believe may be caused by any medication additions or changes?  No   Does the patient have all medications related to this admission filled (includes all antibiotics, pain medications, etc.)  Yes   Is the patient taking all medications as directed (includes completed medication regime)?  Yes   Does the patient have a follow up appointment scheduled with their surgeon?  Yes   Has the patient kept scheduled appointments due by today?  Yes   Comments  NP makes home visits   What is the Home health agency?   [facility]   Has home health visited the patient within 72 hours of discharge?  Yes   Psychosocial issues?  No   Comments  wife administers infusions through [facility], and does dsg changes, wife is RN   Did the patient receive a copy of their discharge instructions?  Yes   Nursing interventions  Reviewed instructions with patient   What is the patient's perception of their health status since discharge?  Improving   Nursing interventions  Nurse provided patient education   Is the patient /caregiver able to teach back basic post-op care?  Take showers only when approved by MD-sponge bathe until then, No tub bath, swimming, or hot tub until instructed by MD, Keep incision areas clean,dry and protected, Do not remove steri-strips, Lifting as instructed by MD in discharge instructions   Is the patient/caregiver able to teach back signs and symptoms of incisional infection?   Increased redness, swelling or pain at the incisonal site, Increased drainage or bleeding, Pus or odor from incision, Incisional warmth, Fever   Is the patient/caregiver able to teach back steps to recovery at home?  Set small, achievable goals for return to baseline health, Rest and rebuild strength, gradually increase activity   If the patient is a current smoker, are they able to teach back resources for cessation?  Not a smoker   Is the patient/caregiver able to teach back the hierarchy of who to call/visit for symptoms/problems? PCP, Specialist, Home health nurse, Urgent Care, ED, 911  Yes   Additional teach back comments  wife states wound showing signs of improvement, is free of infection   Week 2 call completed?  Yes          Renata Avila RN

## 2021-04-22 ENCOUNTER — OFFICE VISIT (OUTPATIENT)
Dept: WOUND CARE | Facility: HOSPITAL | Age: 81
End: 2021-04-22

## 2021-04-22 PROCEDURE — G0463 HOSPITAL OUTPT CLINIC VISIT: HCPCS

## 2021-04-25 ENCOUNTER — READMISSION MANAGEMENT (OUTPATIENT)
Dept: CALL CENTER | Facility: HOSPITAL | Age: 81
End: 2021-04-25

## 2021-04-25 NOTE — OUTREACH NOTE
General Surgery Week 3 Survey      Responses   [facility] patient discharged from?  [facility]   Does the patient have one of the following disease processes/diagnoses(primary or secondary)?  General Surgery   Week 3 attempt successful?  Yes   Call start time  1527   Call end time  1529   Person spoke with today (if not patient) and relationship  Radha, wife   Meds reviewed with patient/caregiver?  Yes   Is the patient taking all medications as directed (includes completed medication regime)?  Yes   Has the patient kept scheduled appointments due by today?  Yes   Comments  Wound care 3 times week, and then [facility] comes 2 times a week, and changes dressing will be 3 times a week this week   What is the patient's perception of their health status since discharge?  Improving   Week 3 call completed?  Yes   Wrap up additional comments  He still sleeps alot, IV antibiotics continued q 6 hours, and wound care dressing  changes, 3 times a week, wound vac starts Monday, wife thinks          Shayla Caro RN

## 2021-04-29 ENCOUNTER — OFFICE VISIT (OUTPATIENT)
Dept: WOUND CARE | Facility: HOSPITAL | Age: 81
End: 2021-04-29

## 2021-04-29 PROCEDURE — G0463 HOSPITAL OUTPT CLINIC VISIT: HCPCS

## 2021-05-06 ENCOUNTER — OFFICE VISIT (OUTPATIENT)
Dept: WOUND CARE | Facility: HOSPITAL | Age: 81
End: 2021-05-06

## 2021-05-06 PROCEDURE — G0463 HOSPITAL OUTPT CLINIC VISIT: HCPCS

## 2021-05-10 ENCOUNTER — LAB REQUISITION (OUTPATIENT)
Dept: LAB | Facility: HOSPITAL | Age: 81
End: 2021-05-10

## 2021-05-10 DIAGNOSIS — L89.150 PRESSURE ULCER OF SACRAL REGION, UNSTAGEABLE (HCC): ICD-10-CM

## 2021-05-10 LAB
ALBUMIN SERPL-MCNC: 3.2 G/DL (ref 3.5–5.2)
ALBUMIN/GLOB SERPL: 0.8 G/DL
ALP SERPL-CCNC: 85 U/L (ref 39–117)
ALT SERPL W P-5'-P-CCNC: 12 U/L (ref 1–41)
ANION GAP SERPL CALCULATED.3IONS-SCNC: 9.8 MMOL/L (ref 5–15)
AST SERPL-CCNC: 13 U/L (ref 1–40)
BILIRUB SERPL-MCNC: 0.2 MG/DL (ref 0–1.2)
BUN SERPL-MCNC: 35 MG/DL (ref 8–23)
BUN/CREAT SERPL: 47.3 (ref 7–25)
CALCIUM SPEC-SCNC: 8.6 MG/DL (ref 8.6–10.5)
CHLORIDE SERPL-SCNC: 102 MMOL/L (ref 98–107)
CO2 SERPL-SCNC: 27.2 MMOL/L (ref 22–29)
CREAT SERPL-MCNC: 0.74 MG/DL (ref 0.76–1.27)
CRP SERPL-MCNC: 1.98 MG/DL (ref 0–0.5)
DEPRECATED RDW RBC AUTO: 39.4 FL (ref 37–54)
ERYTHROCYTE [DISTWIDTH] IN BLOOD BY AUTOMATED COUNT: 14.9 % (ref 12.3–15.4)
ERYTHROCYTE [SEDIMENTATION RATE] IN BLOOD: 76 MM/HR (ref 0–20)
GFR SERPL CREATININE-BSD FRML MDRD: 102 ML/MIN/1.73
GLOBULIN UR ELPH-MCNC: 3.9 GM/DL
GLUCOSE SERPL-MCNC: 115 MG/DL (ref 65–99)
HCT VFR BLD AUTO: 34.7 % (ref 37.5–51)
HGB BLD-MCNC: 10.9 G/DL (ref 13–17.7)
MCH RBC QN AUTO: 23.5 PG (ref 26.6–33)
MCHC RBC AUTO-ENTMCNC: 31.4 G/DL (ref 31.5–35.7)
MCV RBC AUTO: 74.9 FL (ref 79–97)
PLATELET # BLD AUTO: 523 10*3/MM3 (ref 140–450)
PMV BLD AUTO: 9.7 FL (ref 6–12)
POTASSIUM SERPL-SCNC: 3.8 MMOL/L (ref 3.5–5.2)
PREALB SERPL-MCNC: 18.7 MG/DL (ref 20–40)
PROT SERPL-MCNC: 7.1 G/DL (ref 6–8.5)
RBC # BLD AUTO: 4.63 10*6/MM3 (ref 4.14–5.8)
SODIUM SERPL-SCNC: 139 MMOL/L (ref 136–145)
WBC # BLD AUTO: 10.8 10*3/MM3 (ref 3.4–10.8)

## 2021-05-10 PROCEDURE — 85652 RBC SED RATE AUTOMATED: CPT | Performed by: PHYSICAL MEDICINE & REHABILITATION

## 2021-05-10 PROCEDURE — 85027 COMPLETE CBC AUTOMATED: CPT | Performed by: PHYSICAL MEDICINE & REHABILITATION

## 2021-05-10 PROCEDURE — 84134 ASSAY OF PREALBUMIN: CPT | Performed by: PHYSICAL MEDICINE & REHABILITATION

## 2021-05-10 PROCEDURE — 86140 C-REACTIVE PROTEIN: CPT | Performed by: PHYSICAL MEDICINE & REHABILITATION

## 2021-05-10 PROCEDURE — 80053 COMPREHEN METABOLIC PANEL: CPT | Performed by: PHYSICAL MEDICINE & REHABILITATION

## 2021-05-13 ENCOUNTER — APPOINTMENT (OUTPATIENT)
Dept: WOUND CARE | Facility: HOSPITAL | Age: 81
End: 2021-05-13

## 2021-05-20 ENCOUNTER — OFFICE VISIT (OUTPATIENT)
Dept: WOUND CARE | Facility: HOSPITAL | Age: 81
End: 2021-05-20

## 2021-05-20 PROCEDURE — G0463 HOSPITAL OUTPT CLINIC VISIT: HCPCS

## 2021-06-03 ENCOUNTER — APPOINTMENT (OUTPATIENT)
Dept: GENERAL RADIOLOGY | Facility: HOSPITAL | Age: 81
End: 2021-06-03

## 2021-06-03 ENCOUNTER — HOSPITAL ENCOUNTER (INPATIENT)
Facility: HOSPITAL | Age: 81
LOS: 1 days | Discharge: HOME HEALTH | End: 2021-06-05
Attending: EMERGENCY MEDICINE | Admitting: INTERNAL MEDICINE

## 2021-06-03 DIAGNOSIS — D72.829 LEUKOCYTOSIS, UNSPECIFIED TYPE: ICD-10-CM

## 2021-06-03 DIAGNOSIS — A41.9 SEPSIS, DUE TO UNSPECIFIED ORGANISM, UNSPECIFIED WHETHER ACUTE ORGAN DYSFUNCTION PRESENT (HCC): ICD-10-CM

## 2021-06-03 DIAGNOSIS — R50.9 FEVER, UNSPECIFIED FEVER CAUSE: Primary | ICD-10-CM

## 2021-06-03 LAB
ALBUMIN SERPL-MCNC: 3.3 G/DL (ref 3.5–5.2)
ALBUMIN/GLOB SERPL: 0.8 G/DL
ALP SERPL-CCNC: 91 U/L (ref 39–117)
ALT SERPL W P-5'-P-CCNC: 105 U/L (ref 1–41)
ANION GAP SERPL CALCULATED.3IONS-SCNC: 13 MMOL/L (ref 5–15)
AST SERPL-CCNC: 76 U/L (ref 1–40)
B PARAPERT DNA SPEC QL NAA+PROBE: NOT DETECTED
B PERT DNA SPEC QL NAA+PROBE: NOT DETECTED
BACTERIA UR QL AUTO: ABNORMAL /HPF
BASOPHILS # BLD AUTO: 0 10*3/MM3 (ref 0–0.2)
BASOPHILS NFR BLD AUTO: 0.3 % (ref 0–1.5)
BILIRUB SERPL-MCNC: 0.3 MG/DL (ref 0–1.2)
BILIRUB UR QL STRIP: NEGATIVE
BUN SERPL-MCNC: 49 MG/DL (ref 8–23)
BUN/CREAT SERPL: 62 (ref 7–25)
C PNEUM DNA NPH QL NAA+NON-PROBE: NOT DETECTED
CALCIUM SPEC-SCNC: 9.2 MG/DL (ref 8.6–10.5)
CHLORIDE SERPL-SCNC: 110 MMOL/L (ref 98–107)
CLARITY UR: CLEAR
CO2 SERPL-SCNC: 26 MMOL/L (ref 22–29)
COLOR UR: YELLOW
CREAT SERPL-MCNC: 0.79 MG/DL (ref 0.76–1.27)
D-LACTATE SERPL-SCNC: 0.7 MMOL/L (ref 0.5–2)
DEPRECATED RDW RBC AUTO: 45.1 FL (ref 37–54)
EOSINOPHIL # BLD AUTO: 0.2 10*3/MM3 (ref 0–0.4)
EOSINOPHIL NFR BLD AUTO: 1.3 % (ref 0.3–6.2)
ERYTHROCYTE [DISTWIDTH] IN BLOOD BY AUTOMATED COUNT: 18 % (ref 12.3–15.4)
FLUAV SUBTYP SPEC NAA+PROBE: NOT DETECTED
FLUBV RNA ISLT QL NAA+PROBE: NOT DETECTED
GFR SERPL CREATININE-BSD FRML MDRD: 95 ML/MIN/1.73
GLOBULIN UR ELPH-MCNC: 4.3 GM/DL
GLUCOSE SERPL-MCNC: 125 MG/DL (ref 65–99)
GLUCOSE UR STRIP-MCNC: NEGATIVE MG/DL
HADV DNA SPEC NAA+PROBE: NOT DETECTED
HCOV 229E RNA SPEC QL NAA+PROBE: NOT DETECTED
HCOV HKU1 RNA SPEC QL NAA+PROBE: NOT DETECTED
HCOV NL63 RNA SPEC QL NAA+PROBE: NOT DETECTED
HCOV OC43 RNA SPEC QL NAA+PROBE: NOT DETECTED
HCT VFR BLD AUTO: 37.4 % (ref 37.5–51)
HGB BLD-MCNC: 12 G/DL (ref 13–17.7)
HGB UR QL STRIP.AUTO: ABNORMAL
HMPV RNA NPH QL NAA+NON-PROBE: NOT DETECTED
HOLD SPECIMEN: NORMAL
HOLD SPECIMEN: NORMAL
HPIV1 RNA SPEC QL NAA+PROBE: NOT DETECTED
HPIV2 RNA SPEC QL NAA+PROBE: NOT DETECTED
HPIV3 RNA NPH QL NAA+PROBE: NOT DETECTED
HPIV4 P GENE NPH QL NAA+PROBE: NOT DETECTED
HYALINE CASTS UR QL AUTO: ABNORMAL /LPF
KETONES UR QL STRIP: ABNORMAL
LEUKOCYTE ESTERASE UR QL STRIP.AUTO: ABNORMAL
LYMPHOCYTES # BLD AUTO: 2.2 10*3/MM3 (ref 0.7–3.1)
LYMPHOCYTES NFR BLD AUTO: 14.6 % (ref 19.6–45.3)
M PNEUMO IGG SER IA-ACNC: NOT DETECTED
MAGNESIUM SERPL-MCNC: 2.4 MG/DL (ref 1.6–2.4)
MCH RBC QN AUTO: 23.1 PG (ref 26.6–33)
MCHC RBC AUTO-ENTMCNC: 32.2 G/DL (ref 31.5–35.7)
MCV RBC AUTO: 71.7 FL (ref 79–97)
MONOCYTES # BLD AUTO: 1.2 10*3/MM3 (ref 0.1–0.9)
MONOCYTES NFR BLD AUTO: 7.9 % (ref 5–12)
NEUTROPHILS NFR BLD AUTO: 11.4 10*3/MM3 (ref 1.7–7)
NEUTROPHILS NFR BLD AUTO: 75.9 % (ref 42.7–76)
NITRITE UR QL STRIP: NEGATIVE
NRBC BLD AUTO-RTO: 0 /100 WBC (ref 0–0.2)
PH UR STRIP.AUTO: 5.5 [PH] (ref 5–8)
PHOSPHATE SERPL-MCNC: 3.1 MG/DL (ref 2.5–4.5)
PLATELET # BLD AUTO: 469 10*3/MM3 (ref 140–450)
PMV BLD AUTO: 8.3 FL (ref 6–12)
POTASSIUM SERPL-SCNC: 3.6 MMOL/L (ref 3.5–5.2)
PROT SERPL-MCNC: 7.6 G/DL (ref 6–8.5)
PROT UR QL STRIP: ABNORMAL
RBC # BLD AUTO: 5.21 10*6/MM3 (ref 4.14–5.8)
RBC # UR: ABNORMAL /HPF
REF LAB TEST METHOD: ABNORMAL
RHINOVIRUS RNA SPEC NAA+PROBE: NOT DETECTED
RSV RNA NPH QL NAA+NON-PROBE: NOT DETECTED
SARS-COV-2 RNA NPH QL NAA+NON-PROBE: NOT DETECTED
SODIUM SERPL-SCNC: 149 MMOL/L (ref 136–145)
SP GR UR STRIP: 1.02 (ref 1–1.03)
SQUAMOUS #/AREA URNS HPF: ABNORMAL /HPF
UROBILINOGEN UR QL STRIP: ABNORMAL
WBC # BLD AUTO: 15 10*3/MM3 (ref 3.4–10.8)
WBC UR QL AUTO: ABNORMAL /HPF
WHOLE BLOOD HOLD SPECIMEN: NORMAL
YEAST URNS QL MICRO: ABNORMAL /HPF

## 2021-06-03 PROCEDURE — 87086 URINE CULTURE/COLONY COUNT: CPT | Performed by: EMERGENCY MEDICINE

## 2021-06-03 PROCEDURE — 85025 COMPLETE CBC W/AUTO DIFF WBC: CPT | Performed by: EMERGENCY MEDICINE

## 2021-06-03 PROCEDURE — 25010000002 VANCOMYCIN 10 G RECONSTITUTED SOLUTION: Performed by: EMERGENCY MEDICINE

## 2021-06-03 PROCEDURE — 99284 EMERGENCY DEPT VISIT MOD MDM: CPT

## 2021-06-03 PROCEDURE — 87040 BLOOD CULTURE FOR BACTERIA: CPT | Performed by: EMERGENCY MEDICINE

## 2021-06-03 PROCEDURE — 83605 ASSAY OF LACTIC ACID: CPT

## 2021-06-03 PROCEDURE — 71045 X-RAY EXAM CHEST 1 VIEW: CPT

## 2021-06-03 PROCEDURE — G0378 HOSPITAL OBSERVATION PER HR: HCPCS

## 2021-06-03 PROCEDURE — 83735 ASSAY OF MAGNESIUM: CPT | Performed by: INTERNAL MEDICINE

## 2021-06-03 PROCEDURE — 99223 1ST HOSP IP/OBS HIGH 75: CPT | Performed by: INTERNAL MEDICINE

## 2021-06-03 PROCEDURE — 84100 ASSAY OF PHOSPHORUS: CPT | Performed by: INTERNAL MEDICINE

## 2021-06-03 PROCEDURE — 0202U NFCT DS 22 TRGT SARS-COV-2: CPT | Performed by: EMERGENCY MEDICINE

## 2021-06-03 PROCEDURE — 25010000002 CEFEPIME PER 500 MG: Performed by: EMERGENCY MEDICINE

## 2021-06-03 PROCEDURE — 80053 COMPREHEN METABOLIC PANEL: CPT | Performed by: EMERGENCY MEDICINE

## 2021-06-03 PROCEDURE — 81001 URINALYSIS AUTO W/SCOPE: CPT | Performed by: EMERGENCY MEDICINE

## 2021-06-03 RX ORDER — ACETAMINOPHEN 500 MG
500 TABLET ORAL EVERY 6 HOURS PRN
Status: DISCONTINUED | OUTPATIENT
Start: 2021-06-03 | End: 2021-06-05 | Stop reason: HOSPADM

## 2021-06-03 RX ORDER — ASPIRIN AND DIPYRIDAMOLE 25; 200 MG/1; MG/1
1 CAPSULE, EXTENDED RELEASE ORAL EVERY 12 HOURS
Status: DISCONTINUED | OUTPATIENT
Start: 2021-06-03 | End: 2021-06-05 | Stop reason: HOSPADM

## 2021-06-03 RX ORDER — PANTOPRAZOLE SODIUM 40 MG/1
40 TABLET, DELAYED RELEASE ORAL EVERY MORNING
Status: DISCONTINUED | OUTPATIENT
Start: 2021-06-04 | End: 2021-06-05 | Stop reason: HOSPADM

## 2021-06-03 RX ORDER — GABAPENTIN 600 MG/1
600 TABLET ORAL EVERY 12 HOURS SCHEDULED
Status: DISCONTINUED | OUTPATIENT
Start: 2021-06-03 | End: 2021-06-05 | Stop reason: HOSPADM

## 2021-06-03 RX ORDER — SODIUM CHLORIDE 0.9 % (FLUSH) 0.9 %
10 SYRINGE (ML) INJECTION AS NEEDED
Status: DISCONTINUED | OUTPATIENT
Start: 2021-06-03 | End: 2021-06-05 | Stop reason: HOSPADM

## 2021-06-03 RX ORDER — AMLODIPINE BESYLATE 5 MG/1
10 TABLET ORAL EVERY EVENING
Status: DISCONTINUED | OUTPATIENT
Start: 2021-06-03 | End: 2021-06-05 | Stop reason: HOSPADM

## 2021-06-03 RX ORDER — ACETAMINOPHEN 500 MG
500 TABLET ORAL EVERY 6 HOURS PRN
COMMUNITY

## 2021-06-03 RX ORDER — SODIUM CHLORIDE 0.9 % (FLUSH) 0.9 %
10 SYRINGE (ML) INJECTION EVERY 12 HOURS SCHEDULED
Status: DISCONTINUED | OUTPATIENT
Start: 2021-06-03 | End: 2021-06-05 | Stop reason: HOSPADM

## 2021-06-03 RX ADMIN — ASPRIN AND EXTENDED-RELEASE DIPYRIDAMOLE 1 CAPSULE: 25; 200 CAPSULE ORAL at 22:00

## 2021-06-03 RX ADMIN — Medication 10 ML: at 20:00

## 2021-06-03 RX ADMIN — SERTRALINE HYDROCHLORIDE 50 MG: 50 TABLET ORAL at 22:00

## 2021-06-03 RX ADMIN — CEFEPIME HYDROCHLORIDE 2 G: 2 INJECTION, POWDER, FOR SOLUTION INTRAVENOUS at 23:52

## 2021-06-03 RX ADMIN — VANCOMYCIN HYDROCHLORIDE 1250 MG: 10 INJECTION, POWDER, LYOPHILIZED, FOR SOLUTION INTRAVENOUS at 14:21

## 2021-06-03 RX ADMIN — AMLODIPINE BESYLATE 10 MG: 5 TABLET ORAL at 22:00

## 2021-06-03 RX ADMIN — GABAPENTIN 600 MG: 600 TABLET, FILM COATED ORAL at 22:00

## 2021-06-03 RX ADMIN — CEFEPIME 2 G: 2 INJECTION, POWDER, FOR SOLUTION INTRAVENOUS at 14:20

## 2021-06-03 NOTE — H&P
[facility] Medicine Services      Patient Name: Vicente Singh  : 1940  MRN: [id number]  Primary Care Physician: Mickey Mullen APRN  Date of admission: 6/3/2021    Patient Care Team:  Mickey Mullen APRN as PCP - General (Family Medicine)  Jessenia Pederson, RN as Registered Nurse      Subjective   History Present Illness     Chief Complaint:   Chief Complaint   Patient presents with   • Fever     fever reported 101 started 2 days not as awake or talking as normal.       Mr. Singh is a 80 y.o. white male with multimedical problems presents with fever.  Patient has a history of multiple sclerosis and is debilitated.  He was in the hospital in April for an infected decubitus ulcer.  He has a wound VAC in place at this time.  He also has a PICC line in place and is receiving IV Unasyn.  Wife states patient developed a fever 2 days ago up to 101.  Wife states patient has been sleepy and lethargic.  He has not been talking as much as usual.  He denies any complaints of pain.  He has had no cough or congestion.  Wife denies any vomiting or diarrhea.  No alleviating or exacerbating factors.    In ER WBC 15, Na 149. Lactate 0.7, T 97.8, P:115, bp 123/77  Cxr:Stable cardiomegaly which obscures the left lower chest. No acute  chest findings.  RVP-NEG       Review of Systems   Constitutional: Positive for fever and malaise/fatigue.   HENT: Negative.    Eyes: Negative.    Cardiovascular: Negative.    Respiratory: Negative.    Endocrine: Negative.    Hematologic/Lymphatic: Negative.    Skin: Negative.    Musculoskeletal: Positive for muscle weakness.   Gastrointestinal: Negative.    Genitourinary: Negative.    Neurological: Positive for weakness.   Psychiatric/Behavioral: Negative.    Allergic/Immunologic: Negative.    All other systems reviewed and are negative.    Personal History     Past Medical History:   Past Medical History:   Diagnosis Date   • Benign essential hypertension 2021    • Cerebrovascular accident (CVA) (CMS/HCC) 2008   • Chronic indwelling Matos catheter    • Hyperlipidemia 4/1/2021   • Multiple sclerosis (CMS/[facility]) 4/1/2021   • Neurogenic bladder    • Sacral pressure sore    • Sleep apnea    • Stool incontinence    • Type 2 diabetes mellitus (CMS/HCC) 4/1/2021     Surgical History:      Past Surgical History:   Procedure Laterality Date   • DEBRIDEMENT OF ISCHEAL ULCER/BUTTOCKS WOUND N/A 4/2/2021    Procedure: DEBRIDEMENT OF ISCHEAL ULCER/BUTTOCKS WOUND;  Surgeon: Abiodun Bonner MD;  Location: [facility] MAIN OR;  Service: General;  Laterality: N/A;   • ENDOSCOPY N/A 4/2/2021    Procedure: ESOPHAGOGASTRODUODENOSCOPY with balloon dilatation to 18cm at the GE junction and pyloris;  Surgeon: Shanda Drew MD;  Location: [facility] ENDOSCOPY;  Service: Gastroenterology;  Laterality: N/A;  gastro jejunal stricture     Family History: family history includes No Known Problems in his father and mother. Family History was reviewed.     Social History:  reports that he has never smoked. He has never used smokeless tobacco. He reports previous alcohol use. He reports that he does not use drugs.    Medications:  Prior to Admission medications    Medication Sig Start Date End Date Taking? Authorizing Provider   acetaminophen (TYLENOL) 500 MG tablet Take 500 mg by mouth Every 6 (Six) Hours As Needed for Mild Pain .   Yes Lu Casillas MD   amLODIPine (NORVASC) 10 MG tablet Take 1 tablet by mouth Every Evening. 2/27/21  Yes Lu Casillas MD   ampicillin-sulbactam (UNASYN) 3 gm IVPB in 100 mL NS (MBP) Infuse 3 g into a venous catheter Every 6 (Six) Hours. 4/16/21 6/4/21 Yes Lu Casillas MD   aspirin-dipyridamole (AGGRENOX)  MG per 12 hr capsule Take 1 capsule by mouth Every 12 (Twelve) Hours. 1/20/21  Yes Lu Casillas MD   gabapentin (NEURONTIN) 600 MG tablet Take 1 tablet by mouth 2 (two) times a day. 1/23/21  Yes Lu Casillas MD    omeprazole (priLOSEC) 20 MG capsule Take 1 capsule by mouth Daily. 1/19/21  Yes Provider, MD Lu   sertraline (ZOLOFT) 50 MG tablet Take 50 mg by mouth every night at bedtime. 1/19/21  Yes Provider, Lu, MD     Allergies:  No Known Allergies    Objective   Objective     Vital Signs  Temp:  [97.8 °F (36.6 °C)] 97.8 °F (36.6 °C)  Heart Rate:  [] 99  Resp:  [20] 20  BP: ()/(68-88) 131/82  SpO2:  [95 %-96 %] 95 %  on   ;      Body mass index is 19.94 kg/m².    Physical Exam  Vitals and nursing note reviewed.   Constitutional:       General: He is not in acute distress.     Appearance: He is well-developed. He is ill-appearing.   HENT:      Head: Normocephalic and atraumatic.      Nose: Nose normal. No congestion or rhinorrhea.      Mouth/Throat:      Mouth: Mucous membranes are moist.      Pharynx: No oropharyngeal exudate.   Eyes:      General:         Right eye: No discharge.      Extraocular Movements: Extraocular movements intact.      Conjunctiva/sclera: Conjunctivae normal.      Pupils: Pupils are equal, round, and reactive to light.   Neck:      Thyroid: No thyromegaly.      Vascular: No JVD.      Trachea: No tracheal deviation.   Cardiovascular:      Rate and Rhythm: Normal rate and regular rhythm.      Pulses: Normal pulses.      Heart sounds: Normal heart sounds. No murmur heard.     Pulmonary:      Effort: Pulmonary effort is normal. No respiratory distress.      Breath sounds: Normal breath sounds. No stridor.   Abdominal:      General: Bowel sounds are normal. There is no distension.      Palpations: Abdomen is soft. There is no mass.   Musculoskeletal:         General: No swelling or tenderness. Normal range of motion.      Cervical back: Normal range of motion and neck supple. No rigidity. No muscular tenderness.      Comments: Loss of muscle mass   Skin:     General: Skin is warm and dry.      Coloration: Skin is pale. Skin is not jaundiced.      Findings: Bruising present.  No erythema or rash.      Comments: Sacral decubitus ulcer with wound VAC   Neurological:      General: No focal deficit present.      Mental Status: He is alert and oriented to person, place, and time. Mental status is at baseline.      Cranial Nerves: No cranial nerve deficit.      Sensory: No sensory deficit.      Motor: Weakness present. No abnormal muscle tone.      Coordination: Coordination normal.   Psychiatric:         Mood and Affect: Mood normal.         Thought Content: Thought content normal.           Results Review:  I have personally reviewed most recent lab results and agree with findings, most notably: wbc.    Results from last 7 days   Lab Units 06/03/21  1411   WBC 10*3/mm3 15.00*   HEMOGLOBIN g/dL 12.0*   HEMATOCRIT % 37.4*   PLATELETS 10*3/mm3 469*     Results from last 7 days   Lab Units 06/03/21  1419 06/03/21  1411   SODIUM mmol/L  --  149*   POTASSIUM mmol/L  --  3.6   CHLORIDE mmol/L  --  110*   CO2 mmol/L  --  26.0   BUN mg/dL  --  49*   CREATININE mg/dL  --  0.79   GLUCOSE mg/dL  --  125*   CALCIUM mg/dL  --  9.2   ALT (SGPT) U/L  --  105*   AST (SGOT) U/L  --  76*   LACTATE mmol/L 0.7  --      Estimated Creatinine Clearance: 69.9 mL/min (by C-G formula based on SCr of 0.79 mg/dL).  Brief Urine Lab Results  (Last result in the past 365 days)      Color   Clarity   Blood   Leuk Est   Nitrite   Protein   CREAT   Urine HCG        06/03/21 1416 Yellow Clear Small (1+) Moderate (2+) Negative 30 mg/dL (1+)               Microbiology Results (last 10 days)     Procedure Component Value - Date/Time    Respiratory Panel PCR w/COVID-19(SARS-CoV-2) VICKY/TERESA/ALFIE/PAD/COR/MAD/DAMARIS In-House, NP Swab in UTM/VTM, 3-4 HR TAT - Swab, Nasopharynx [[id number]]  (Normal) Collected: 06/03/21 1404    Lab Status: Final result Specimen: Swab from Nasopharynx Updated: 06/03/21 1459     ADENOVIRUS, PCR Not Detected     Coronavirus 229E Not Detected     Coronavirus HKU1 Not Detected     Coronavirus NL63 Not Detected      Coronavirus OC43 Not Detected     COVID19 Not Detected     Human Metapneumovirus Not Detected     Human Rhinovirus/Enterovirus Not Detected     Influenza A PCR Not Detected     Influenza B PCR Not Detected     Parainfluenza Virus 1 Not Detected     Parainfluenza Virus 2 Not Detected     Parainfluenza Virus 3 Not Detected     Parainfluenza Virus 4 Not Detected     RSV, PCR Not Detected     Bordetella pertussis pcr Not Detected     Bordetella parapertussis PCR Not Detected     Chlamydophila pneumoniae PCR Not Detected     Mycoplasma pneumo by PCR Not Detected    Narrative:      In the setting of a positive respiratory panel with a viral infection PLUS a negative procalcitonin without other underlying concern for bacterial infection, consider observing off antibiotics or discontinuation of antibiotics and continue supportive care. If the respiratory panel is positive for atypical bacterial infection (Bordetella pertussis, Chlamydophila pneumoniae, or Mycoplasma pneumoniae), consider antibiotic de-escalation to target atypical bacterial infection.          ECG/EMG Results (most recent)     None            XR Chest 1 View    Result Date: 6/3/2021   1. Stable cardiomegaly which obscures the left lower chest. No acute chest findings.  Electronically Signed By-Analia Smyth MD On:6/3/2021 2:24 PM This report was finalized on [id number] by  Analia Smyth MD.    Estimated Creatinine Clearance: 69.9 mL/min (by C-G formula based on SCr of 0.79 mg/dL).    Assessment/Plan   Assessment/Plan     Active Hospital Problems    Diagnosis  POA   • Fever [R50.9]  Yes   • Multiple sclerosis (CMS/HCC) [G35]  Yes   • Benign essential hypertension [I10]  Yes   • Hyperlipidemia [E78.5]  Yes   • Type 2 diabetes mellitus (CMS/HCC) [E11.9]  Yes   • GERD without esophagitis [K21.9]  Yes   • History of CVA (cerebrovascular accident) [Z86.73]  Not Applicable   • Neurogenic bladder [N31.9]  Yes   • Mild malnutrition (CMS/HCC) [E44.1]  Yes   •  Bowel incontinence [R15.9]  Yes   • Sacral pressure ulcer [L89.159]  Yes   • Pressure injury of sacral region, unstageable (CMS/HCC) [L89.150]  Yes   • Dysphagia [R13.10]  Yes      Resolved Hospital Problems   No resolved problems to display.      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  -ID consult  -s/p surgical debridement on April 4, 2021  -last MRI of the pelvis showed osteomyelitis of the sacrum  -Cultures grew methicillin susceptible Staphylococcus aureus and Enterococcus faecalis.  The last organism was susceptible to vancomycin and ampicillin  -patient appearing debilitated with significant sequelae from multiple sclerosis likely to have poor healing, also with poor nutrition.    -Wound care, patient spouse taught dressing changes  -Nutrition consult, continue Tunde supplementation outpatient  -IV antibiotics with vanc/cefepime,  -MRI of pelvis   -Inflammatory markers  -Blood cultures    -Daily CBC     Leukocytosis-15. due to underlying infected ulcer,   -Continue antibiotics     Thrombocytosis-likely reactive due to underlying infection, normalizing  -Monitor daily     Anemia-  -Monitor daily  -iron, ferritin     Multiple sclerosis-with significant sequelae including multiple contractures chronic debility and signs of wasting  The patient is bedridden for 10 years secondary to multiple sclerosis  -Continue home medications  -PT/OT  -Patient's wife reports patient has issues with his meals, has copious thick secretions with eating and inhibits his ability to tolerate p.o.  - Mucinex  -GI was consulted for esophageal dysmotility issues,s/p EGD with dilation on 4/2/2021 doing well post procedure, follow-up outpatient to discuss Botox in the future     Hypertension/GERD/depression-chronic in nature  -cont. home medication      CVA-patient previously reported, on antiplatelets  -Continue     Neurogenic bladder-due to MS  -Continue Matos cath       VTE Prophylaxis -    Mechanical Order History:     None      Pharmalogical Order History:     None          CODE STATUS:    There are no questions and answers to display.       This patient has been examined wearing appropriate Personal Protective Equipment and discussed with rn. 06/03/21      I discussed the patient's findings and my recommendations with patient.      Signature:Electronically signed by Brock Alford MD, 06/03/21, 4:15 PM EDT.    [facility] Hospitalist Team

## 2021-06-03 NOTE — ED PROVIDER NOTES
Subjective   Chief complaint: Fever    80-year-old male presents with fever.  Patient has a history of multiple sclerosis and is debilitated.  He was in the hospital in April for an infected decubitus ulcer.  He has a wound VAC in place at this time.  He also has a PICC line in place and is receiving IV Unasyn.  Wife states patient developed a fever 2 days ago up to 101.  Wife states patient has been sleepy and lethargic.  He has not been talking as much as usual.  He denies any complaints of pain.  He has had no cough or congestion.  Wife denies any vomiting or diarrhea.  No alleviating or exacerbating factors.      History provided by:  Patient and spouse      Review of Systems   Constitutional: Positive for fatigue and fever.   HENT: Negative for congestion and sore throat.    Eyes: Negative for redness.   Respiratory: Negative for cough and shortness of breath.    Cardiovascular: Negative for chest pain.   Gastrointestinal: Negative for abdominal pain, diarrhea and vomiting.   Genitourinary: Negative for dysuria.   Musculoskeletal: Negative for back pain.   Skin: Positive for wound.   Neurological: Negative for dizziness and headaches.   Psychiatric/Behavioral: Negative for confusion.       Past Medical History:   Diagnosis Date   • Benign essential hypertension 4/1/2021   • Cerebrovascular accident (CVA) (CMS/[facility]) 2008   • Chronic indwelling Matos catheter    • Hyperlipidemia 4/1/2021   • Multiple sclerosis (CMS/[facility]) 4/1/2021   • Neurogenic bladder    • Sacral pressure sore    • Sleep apnea    • Stool incontinence    • Type 2 diabetes mellitus (CMS/[facility]) 4/1/2021       No Known Allergies    Past Surgical History:   Procedure Laterality Date   • DEBRIDEMENT OF ISCHEAL ULCER/BUTTOCKS WOUND N/A 4/2/2021    Procedure: DEBRIDEMENT OF ISCHEAL ULCER/BUTTOCKS WOUND;  Surgeon: Abiodun Bonner MD;  Location: [facility] MAIN OR;  Service: General;  Laterality: N/A;   • ENDOSCOPY N/A 4/2/2021    Procedure:  "ESOPHAGOGASTRODUODENOSCOPY with balloon dilatation to 18cm at the GE junction and pyloris;  Surgeon: Shanda Drew MD;  Location: [facility] ENDOSCOPY;  Service: Gastroenterology;  Laterality: N/A;  gastro jejunal stricture       Family History   Problem Relation Age of Onset   • No Known Problems Mother    • No Known Problems Father        Social History     Socioeconomic History   • Marital status:      Spouse name: Not on file   • Number of children: Not on file   • Years of education: Not on file   • Highest education level: Not on file   Tobacco Use   • Smoking status: Never Smoker   • Smokeless tobacco: Never Used   Vaping Use   • Vaping Use: Never used   Substance and Sexual Activity   • Alcohol use: Not Currently   • Drug use: Never   • Sexual activity: Defer       /77   Pulse 100   Temp 97.8 °F (36.6 °C) (Oral)   Resp 20   Ht 180.3 cm (71\")   Wt 64.9 kg (143 lb)   SpO2 95%   BMI 19.94 kg/m²       Objective   Physical Exam  Vitals and nursing note reviewed.   Constitutional:       Appearance: He is well-developed.      Comments: Chronically ill appearing   HENT:      Head: Normocephalic and atraumatic.   Eyes:      Extraocular Movements: Extraocular movements intact.      Pupils: Pupils are equal, round, and reactive to light.   Cardiovascular:      Rate and Rhythm: Normal rate and regular rhythm.      Heart sounds: Normal heart sounds.   Pulmonary:      Effort: Pulmonary effort is normal. No respiratory distress.      Breath sounds: Normal breath sounds.   Abdominal:      General: Bowel sounds are normal.      Palpations: Abdomen is soft.      Tenderness: There is no abdominal tenderness.   Skin:     Comments: Wound VAC in place to the coccyx   Neurological:      Mental Status: He is alert and oriented to person, place, and time.      Comments: Generally weak         Procedures           ED Course      Results for orders placed or performed during the hospital encounter of 06/03/21 "   Respiratory Panel PCR w/COVID-19(SARS-CoV-2) VICKY/TERESA/ALFIE/PAD/COR/MAD/DAMARIS In-House, NP Swab in UTM/VTM, 3-4 HR TAT - Swab, Nasopharynx    Specimen: Nasopharynx; Swab   Result Value Ref Range    ADENOVIRUS, PCR Not Detected Not Detected    Coronavirus 229E Not Detected Not Detected    Coronavirus HKU1 Not Detected Not Detected    Coronavirus NL63 Not Detected Not Detected    Coronavirus OC43 Not Detected Not Detected    COVID19 Not Detected Not Detected - Ref. Range    Human Metapneumovirus Not Detected Not Detected    Human Rhinovirus/Enterovirus Not Detected Not Detected    Influenza A PCR Not Detected Not Detected    Influenza B PCR Not Detected Not Detected    Parainfluenza Virus 1 Not Detected Not Detected    Parainfluenza Virus 2 Not Detected Not Detected    Parainfluenza Virus 3 Not Detected Not Detected    Parainfluenza Virus 4 Not Detected Not Detected    RSV, PCR Not Detected Not Detected    Bordetella pertussis pcr Not Detected Not Detected    Bordetella parapertussis PCR Not Detected Not Detected    Chlamydophila pneumoniae PCR Not Detected Not Detected    Mycoplasma pneumo by PCR Not Detected Not Detected   Comprehensive Metabolic Panel    Specimen: Blood   Result Value Ref Range    Glucose 125 (H) 65 - 99 mg/dL    BUN 49 (H) 8 - 23 mg/dL    Creatinine 0.79 0.76 - 1.27 mg/dL    Sodium 149 (H) 136 - 145 mmol/L    Potassium 3.6 3.5 - 5.2 mmol/L    Chloride 110 (H) 98 - 107 mmol/L    CO2 26.0 22.0 - 29.0 mmol/L    Calcium 9.2 8.6 - 10.5 mg/dL    Total Protein 7.6 6.0 - 8.5 g/dL    Albumin 3.30 (L) 3.50 - 5.20 g/dL    ALT (SGPT) 105 (H) 1 - 41 U/L    AST (SGOT) 76 (H) 1 - 40 U/L    Alkaline Phosphatase 91 39 - 117 U/L    Total Bilirubin 0.3 0.0 - 1.2 mg/dL    eGFR Non African Amer 95 >60 mL/min/1.73    Globulin 4.3 gm/dL    A/G Ratio 0.8 g/dL    BUN/Creatinine Ratio 62.0 (H) 7.0 - 25.0    Anion Gap 13.0 5.0 - 15.0 mmol/L   Urinalysis With Culture If Indicated - Urine, Catheter    Specimen: Urine, Catheter    Result Value Ref Range    Color, UA Yellow Yellow, Straw    Appearance, UA Clear Clear    pH, UA 5.5 5.0 - 8.0    Specific Gravity, UA 1.024 1.005 - 1.030    Glucose, UA Negative Negative    Ketones, UA Trace (A) Negative    Bilirubin, UA Negative Negative    Blood, UA Small (1+) (A) Negative    Protein, UA 30 mg/dL (1+) (A) Negative    Leuk Esterase, UA Moderate (2+) (A) Negative    Nitrite, UA Negative Negative    Urobilinogen, UA 0.2 E.U./dL 0.2 - 1.0 E.U./dL   CBC Auto Differential    Specimen: Blood   Result Value Ref Range    WBC 15.00 (H) 3.40 - 10.80 10*3/mm3    RBC 5.21 4.14 - 5.80 10*6/mm3    Hemoglobin 12.0 (L) 13.0 - 17.7 g/dL    Hematocrit 37.4 (L) 37.5 - 51.0 %    MCV 71.7 (L) 79.0 - 97.0 fL    MCH 23.1 (L) 26.6 - 33.0 pg    MCHC 32.2 31.5 - 35.7 g/dL    RDW 18.0 (H) 12.3 - 15.4 %    RDW-SD 45.1 37.0 - 54.0 fl    MPV 8.3 6.0 - 12.0 fL    Platelets 469 (H) 140 - 450 10*3/mm3    Neutrophil % 75.9 42.7 - 76.0 %    Lymphocyte % 14.6 (L) 19.6 - 45.3 %    Monocyte % 7.9 5.0 - 12.0 %    Eosinophil % 1.3 0.3 - 6.2 %    Basophil % 0.3 0.0 - 1.5 %    Neutrophils, Absolute 11.40 (H) 1.70 - 7.00 10*3/mm3    Lymphocytes, Absolute 2.20 0.70 - 3.10 10*3/mm3    Monocytes, Absolute 1.20 (H) 0.10 - 0.90 10*3/mm3    Eosinophils, Absolute 0.20 0.00 - 0.40 10*3/mm3    Basophils, Absolute 0.00 0.00 - 0.20 10*3/mm3    nRBC 0.0 0.0 - 0.2 /100 WBC   Urinalysis, Microscopic Only - Urine, Catheter    Specimen: Urine, Catheter   Result Value Ref Range    RBC, UA 0-2 (A) None Seen /HPF    WBC, UA 6-12 (A) None Seen /HPF    Bacteria, UA None Seen None Seen /HPF    Squamous Epithelial Cells, UA 0-2 None Seen, 0-2 /HPF    Yeast, UA Small/1+ Budding Yeast None Seen /HPF    Hyaline Casts, UA None Seen None Seen /LPF    Methodology Manual Light Microscopy    POC Lactate    Specimen: Blood   Result Value Ref Range    Lactate 0.7 0.5 - 2.0 mmol/L   Light Blue Top   Result Value Ref Range    Extra Tube hold for add-on    Green Top (Gel)    Result Value Ref Range    Extra Tube Hold for add-ons.    Gold Top - SST   Result Value Ref Range    Extra Tube Hold for add-ons.      XR Chest 1 View    Result Date: 6/3/2021   1. Stable cardiomegaly which obscures the left lower chest. No acute chest findings.  Electronically Signed By-Analia Smyth MD On:6/3/2021 2:24 PM This report was finalized on [id number] by  Analia Smyth MD.                                         MDM   Patient had the above evaluation.  Results were discussed with the patient.  Work-up has been fairly unremarkable so far.  Chest x-ray shows no acute findings.  Urinalysis shows no obvious urinary tract infection.  This is a sample taken from his indwelling Matos catheter.  Respiratory panel is negative.  Patient does have an elevated white blood cell count 15,000.  He is afebrile currently.  He does meet sepsis criteria however lactic acid was normal.  Blood cultures were obtained.  He was started on cefepime and vancomycin.  I discussed with the hospitalist and the patient will be admitted for further evaluation and management.      Final diagnoses:   Fever, unspecified fever cause   Leukocytosis, unspecified type   Sepsis, due to unspecified organism, unspecified whether acute organ dysfunction present (CMS/[facility])       ED Disposition  ED Disposition     ED Disposition Condition Comment    Decision to Admit  Level of Care: Telemetry [5]   Diagnosis: Fever, unspecified fever cause [[id number]]   Admitting Physician: CHIOMA FARMER [[id number]]            No follow-up provider specified.       Medication List      No changes were made to your prescriptions during this visit.          Dg Jiménez MD  06/03/21 [id number]

## 2021-06-03 NOTE — CASE MANAGEMENT/SOCIAL WORK
Discharge Planning Assessment  [facility]     Patient Name: Vicente Singh  MRN: [id number]  Today's Date: 6/3/2021    Admit Date: 6/3/2021    Discharge Needs Assessment     Row Name 06/03/21 1635       Living Environment    Lives With  spouse    Current Living Arrangements  home/apartment/condo    Primary Care Provided by  spouse/significant other    Provides Primary Care For  no one    Family Caregiver if Needed  spouse    Able to Return to Prior Arrangements  yes       Transition Planning    Patient/Family Anticipates Transition to  home with help/services Return home with spouse.[facility] current, [facility] supplies IVABs    Patient/Family Anticipated Services at Transition  home health care    Transportation Anticipated  family or friend will provide       Discharge Needs Assessment    Readmission Within the Last 30 Days  no previous admission in last 30 days    Equipment Currently Used at Home  wheelchair;other (see comments) vac    Concerns to be Addressed  discharge planning declines skilled level of care    Provided Post Acute Provider List?  -- declines skilled care        Discharge Plan     Row Name 06/03/21 1640       Plan    Plan  DC Plan: Return home with spouse, current with [facility]    Plan Comments  Spoke with patient and spouse at bedside, plan to return home. Spouse said she plans to resume [facility] services and she is giving IVABs, [facility] delivers. [facility] has wound vac and [facility] changes 3 x wk.PCP: [facility], Pharmacy:[facility], Cultures pending.        Continued Care and Services - Admitted Since 6/3/2021     Dialysis/Infusion     Service Provider Request Status Selected Services Address Phone Fax Patient Preferred    [facility]  Pending - Request Sent N/A [address] [address] [phone] --          Home Medical Care     Service Provider Request Status Selected Services Address Phone Fax Patient [facility]   [facility] [address] [facility] [place] [address] [phone] -- --             Demographic Summary     Row Name 06/03/21 1633       General Information    Admission Type  observation    Arrived From  emergency department    Referral Source  emergency department    Reason for Consult  discharge planning    Preferred Language  English        Functional Status     Row Name 06/03/21 1634       Functional Status    Usual Activity Tolerance  fair    Current Activity Tolerance  fair       Functional Status, IADL    Medications  completely dependent    Meal Preparation  completely dependent    Housekeeping  completely dependent     Cristal Lee RN   Met with patient in room wearing PPE: mask, face shield/goggles, gloves, gown.      Maintained distance greater than six feet and spent less than 15 minutes in the room.

## 2021-06-03 NOTE — PROGRESS NOTES
"Pharmacy Antimicrobial Dosing Service    Subjective:  Vicetne Singh is a 80 y.o.male admitted with sepsis/fever/leukocytosis. Pharmacy has been consulted to dose vancomycin for possible sepsis.    PMH:       Assessment/Plan    1. Day #1/7 Vancomycin: 1gm iv q 12hr ( 1gm dose given in ER) AUC goal 400-600 w trough goal 10-20mcg/ml.  Trough level due 6/5/21 at 300 am and random level on 6/5/21 at 800 am.    2. Day #1/7 cefepime 2 gm given in ER then 2 gm iv q 8 hrs for crcl over 60ml/min..    Will continue to monitor drug levels, renal function, culture and sensitivities, and patient clinical status.       Objective:  Relevant clinical data and objective history reviewed:  180.3 cm (71\")   64.9 kg (143 lb)   Ideal body weight: 75.3 kg (166 lb 0.1 oz)  Body mass index is 19.94 kg/m².        Results from last 7 days   Lab Units 06/03/21  1411   CREATININE mg/dL 0.79     Estimated Creatinine Clearance: 69.9 mL/min (by C-G formula based on SCr of 0.79 mg/dL).  No intake/output data recorded.    Results from last 7 days   Lab Units 06/03/21  1411   WBC 10*3/mm3 15.00*     Temperature    06/03/21 1220   Temp: 97.8 °F (36.6 °C)     Baseline culture/source/susceptibility:  Microbiology Results (last 10 days)       Procedure Component Value - Date/Time    Respiratory Panel PCR w/COVID-19(SARS-CoV-2) VICKY/TERESA/ALFIE/PAD/COR/MAD/DAMARIS In-House, NP Swab in UTM/VTM, 3-4 HR TAT - Swab, Nasopharynx [[id number]]  (Normal) Collected: 06/03/21 1404    Lab Status: Final result Specimen: Swab from Nasopharynx Updated: 06/03/21 1458     ADENOVIRUS, PCR Not Detected     Coronavirus 229E Not Detected     Coronavirus HKU1 Not Detected     Coronavirus NL63 Not Detected     Coronavirus OC43 Not Detected     COVID19 Not Detected     Human Metapneumovirus Not Detected     Human Rhinovirus/Enterovirus Not Detected     Influenza A PCR Not Detected     Influenza B PCR Not Detected     Parainfluenza Virus 1 Not Detected     Parainfluenza Virus 2 Not " Detected     Parainfluenza Virus 3 Not Detected     Parainfluenza Virus 4 Not Detected     RSV, PCR Not Detected     Bordetella pertussis pcr Not Detected     Bordetella parapertussis PCR Not Detected     Chlamydophila pneumoniae PCR Not Detected     Mycoplasma pneumo by PCR Not Detected    Narrative:      In the setting of a positive respiratory panel with a viral infection PLUS a negative procalcitonin without other underlying concern for bacterial infection, consider observing off antibiotics or discontinuation of antibiotics and continue supportive care. If the respiratory panel is positive for atypical bacterial infection (Bordetella pertussis, Chlamydophila pneumoniae, or Mycoplasma pneumoniae), consider antibiotic de-escalation to target atypical bacterial infection.              Anti-Infectives (From admission, onward)      Ordered     Dose/Rate Route Frequency Start Stop    06/03/21 1624  cefepime 2 gm IVPB in 100 ml NS (MBP)     Ordering Provider: Brock Alford MD    2 g  over 30 Minutes Intravenous Every 12 Hours 06/04/21 0500 06/10/21 1659    06/03/21 1623  vancomycin (VANCOCIN) 1,000 mg in sodium chloride 0.9 % 250 mL IVPB     Ordering Provider: Brock Alford MD    1,000 mg Intravenous Every 12 Hours 06/04/21 0400 06/07/21 0359    06/03/21 1331  cefepime 2 gm IVPB in 100 ml NS (MBP)     Ordering Provider: Dg Jiménez MD    2 g  over 30 Minutes Intravenous Once 06/03/21 1333 06/03/21 1450    06/03/21 1331  vancomycin 1250 mg/250 mL 0.9% NS IVPB ([facility])     Ordering Provider: Dg Jiménez MD    20 mg/kg × 64.9 kg Intravenous Once 06/03/21 1333 06/03/21 1421    06/03/21 1406  ampicillin-sulbactam (UNASYN) 3 gm IVPB in 100 mL NS (MBP)     Ordering Provider: Lu Casillas MD    3 g Intravenous Every 6 Hours 04/16/21 0000 06/04/21 1199            Katherine Stark [facility]  06/03/21 16:29 EDT

## 2021-06-03 NOTE — PLAN OF CARE
Goal Outcome Evaluation:   Pt admitted to room from ED. Pt's spouse at bedside. Pt has no complaints of pain at this time.

## 2021-06-04 PROBLEM — L98.429 STAGE 4 SKIN ULCER OF SACRAL REGION (HCC): Status: ACTIVE | Noted: 2021-06-04

## 2021-06-04 PROBLEM — L89.223 DECUBITUS ULCER OF LEFT HIP, STAGE 3 (HCC): Status: ACTIVE | Noted: 2021-06-04

## 2021-06-04 PROBLEM — L97.511 NON-PRESSURE CHRONIC ULCER OF OTHER PART OF RIGHT FOOT LIMITED TO BREAKDOWN OF SKIN (HCC): Status: ACTIVE | Noted: 2021-06-04

## 2021-06-04 PROBLEM — E44.0 MODERATE MALNUTRITION (HCC): Status: ACTIVE | Noted: 2021-04-01

## 2021-06-04 PROBLEM — L89.211 PRESSURE INJURY OF RIGHT HIP, STAGE 1: Status: ACTIVE | Noted: 2021-06-04

## 2021-06-04 PROBLEM — L97.522 NON-PRESSURE CHRONIC ULCER OF OTHER PART OF LEFT FOOT WITH FAT LAYER EXPOSED (HCC): Status: ACTIVE | Noted: 2021-06-04

## 2021-06-04 LAB
ANION GAP SERPL CALCULATED.3IONS-SCNC: 10 MMOL/L (ref 5–15)
BACTERIA SPEC AEROBE CULT: ABNORMAL
BASOPHILS # BLD AUTO: 0.1 10*3/MM3 (ref 0–0.2)
BASOPHILS NFR BLD AUTO: 0.8 % (ref 0–1.5)
BUN SERPL-MCNC: 43 MG/DL (ref 8–23)
BUN/CREAT SERPL: 58.9 (ref 7–25)
CALCIUM SPEC-SCNC: 8.5 MG/DL (ref 8.6–10.5)
CHLORIDE SERPL-SCNC: 112 MMOL/L (ref 98–107)
CO2 SERPL-SCNC: 26 MMOL/L (ref 22–29)
CREAT SERPL-MCNC: 0.73 MG/DL (ref 0.76–1.27)
DEPRECATED RDW RBC AUTO: 45.5 FL (ref 37–54)
EOSINOPHIL # BLD AUTO: 0.4 10*3/MM3 (ref 0–0.4)
EOSINOPHIL NFR BLD AUTO: 2.8 % (ref 0.3–6.2)
ERYTHROCYTE [DISTWIDTH] IN BLOOD BY AUTOMATED COUNT: 17.8 % (ref 12.3–15.4)
GFR SERPL CREATININE-BSD FRML MDRD: 104 ML/MIN/1.73
GLUCOSE SERPL-MCNC: 145 MG/DL (ref 65–99)
HCT VFR BLD AUTO: 33.7 % (ref 37.5–51)
HGB BLD-MCNC: 10.4 G/DL (ref 13–17.7)
LYMPHOCYTES # BLD AUTO: 1.8 10*3/MM3 (ref 0.7–3.1)
LYMPHOCYTES NFR BLD AUTO: 11.2 % (ref 19.6–45.3)
MAGNESIUM SERPL-MCNC: 2.1 MG/DL (ref 1.6–2.4)
MCH RBC QN AUTO: 22.3 PG (ref 26.6–33)
MCHC RBC AUTO-ENTMCNC: 30.9 G/DL (ref 31.5–35.7)
MCV RBC AUTO: 72.2 FL (ref 79–97)
MONOCYTES # BLD AUTO: 0.8 10*3/MM3 (ref 0.1–0.9)
MONOCYTES NFR BLD AUTO: 4.8 % (ref 5–12)
NEUTROPHILS NFR BLD AUTO: 13 10*3/MM3 (ref 1.7–7)
NEUTROPHILS NFR BLD AUTO: 80.4 % (ref 42.7–76)
NRBC BLD AUTO-RTO: 0 /100 WBC (ref 0–0.2)
PHOSPHATE SERPL-MCNC: 2.5 MG/DL (ref 2.5–4.5)
PLATELET # BLD AUTO: 389 10*3/MM3 (ref 140–450)
PMV BLD AUTO: 8.7 FL (ref 6–12)
POTASSIUM SERPL-SCNC: 3.6 MMOL/L (ref 3.5–5.2)
RBC # BLD AUTO: 4.67 10*6/MM3 (ref 4.14–5.8)
SODIUM SERPL-SCNC: 148 MMOL/L (ref 136–145)
WBC # BLD AUTO: 16.2 10*3/MM3 (ref 3.4–10.8)

## 2021-06-04 PROCEDURE — 80048 BASIC METABOLIC PNL TOTAL CA: CPT | Performed by: INTERNAL MEDICINE

## 2021-06-04 PROCEDURE — 83735 ASSAY OF MAGNESIUM: CPT | Performed by: INTERNAL MEDICINE

## 2021-06-04 PROCEDURE — 25010000002 CEFEPIME PER 500 MG: Performed by: INTERNAL MEDICINE

## 2021-06-04 PROCEDURE — 25010000002 VANCOMYCIN 1 G RECONSTITUTED SOLUTION 1 EACH VIAL: Performed by: INTERNAL MEDICINE

## 2021-06-04 PROCEDURE — 99232 SBSQ HOSP IP/OBS MODERATE 35: CPT | Performed by: NURSE PRACTITIONER

## 2021-06-04 PROCEDURE — 84100 ASSAY OF PHOSPHORUS: CPT | Performed by: INTERNAL MEDICINE

## 2021-06-04 PROCEDURE — 85025 COMPLETE CBC W/AUTO DIFF WBC: CPT | Performed by: INTERNAL MEDICINE

## 2021-06-04 PROCEDURE — 99232 SBSQ HOSP IP/OBS MODERATE 35: CPT | Performed by: INTERNAL MEDICINE

## 2021-06-04 RX ORDER — FLUCONAZOLE 100 MG/1
200 TABLET ORAL
Status: DISCONTINUED | OUTPATIENT
Start: 2021-06-04 | End: 2021-06-05 | Stop reason: HOSPADM

## 2021-06-04 RX ORDER — ACETAMINOPHEN 650 MG
TABLET, EXTENDED RELEASE ORAL DAILY
Status: DISCONTINUED | OUTPATIENT
Start: 2021-06-04 | End: 2021-06-05 | Stop reason: HOSPADM

## 2021-06-04 RX ORDER — ARGININE/GLUTAMINE/CALCIUM BMB 7G-7G-1.5G
1 POWDER IN PACKET (EA) ORAL 2 TIMES DAILY
Status: DISCONTINUED | OUTPATIENT
Start: 2021-06-04 | End: 2021-06-05 | Stop reason: HOSPADM

## 2021-06-04 RX ADMIN — Medication 10 ML: at 20:36

## 2021-06-04 RX ADMIN — FLUCONAZOLE 200 MG: 100 TABLET ORAL at 16:04

## 2021-06-04 RX ADMIN — GABAPENTIN 600 MG: 600 TABLET, FILM COATED ORAL at 10:34

## 2021-06-04 RX ADMIN — SERTRALINE HYDROCHLORIDE 50 MG: 50 TABLET ORAL at 10:34

## 2021-06-04 RX ADMIN — VANCOMYCIN HYDROCHLORIDE 1000 MG: 1 INJECTION, POWDER, LYOPHILIZED, FOR SOLUTION INTRAVENOUS at 03:59

## 2021-06-04 RX ADMIN — CEFEPIME HYDROCHLORIDE 2 G: 2 INJECTION, POWDER, FOR SOLUTION INTRAVENOUS at 10:35

## 2021-06-04 RX ADMIN — Medication 10 ML: at 10:35

## 2021-06-04 RX ADMIN — POVIDONE-IODINE: 10 SOLUTION TOPICAL at 10:35

## 2021-06-04 RX ADMIN — PANTOPRAZOLE SODIUM 40 MG: 40 TABLET, DELAYED RELEASE ORAL at 06:04

## 2021-06-04 RX ADMIN — GABAPENTIN 600 MG: 600 TABLET, FILM COATED ORAL at 20:36

## 2021-06-04 RX ADMIN — ASPRIN AND EXTENDED-RELEASE DIPYRIDAMOLE 1 CAPSULE: 25; 200 CAPSULE ORAL at 10:35

## 2021-06-04 RX ADMIN — ASPRIN AND EXTENDED-RELEASE DIPYRIDAMOLE 1 CAPSULE: 25; 200 CAPSULE ORAL at 20:36

## 2021-06-04 RX ADMIN — AMLODIPINE BESYLATE 10 MG: 5 TABLET ORAL at 16:04

## 2021-06-04 RX ADMIN — Medication 1 PACKET: at 20:36

## 2021-06-04 NOTE — CASE MANAGEMENT/SOCIAL WORK
Discharge Planning Assessment   [facility]     Patient Name: Vicente Singh  MRN: [id number]  Today's Date: 6/4/2021    Admit Date: 6/3/2021          Plan    Patient/Family in Agreement with Plan  yes    Plan Comments  barrier is pending infectious disease with possible need of iv antibiotics; theodore with [facility] notified patient here with possible needs;         Patient Forms    Important Message from Medicare ([facility])  Delivered    Delivered to  Support person    Method of delivery  In person            Carol naegele rn  Case management  Office number [phone]  Cell phone [phone]

## 2021-06-04 NOTE — CONSULTS
Infectious Diseases Consult Note    Referring Provider: Brock Alford MD    Reason for Consultation: Fever    Patient Care Team:  Mickey Mullen APRN as PCP - General (Family Medicine)  Jessenia Pederson, RN as Registered Nurse    Chief complaint fever prior to admission    Subjective     History of present illness:      This is 80-year-old white male who was hospitalized [facility] on Cecelia 3, 2021.  His wife brought him to the hospital with complaint of fever at home.  Unfamiliar with this patient from recent hospital admission and we saw the him back in early April 2021 with sacral wound and osteomyelitis.  His wound culture grow methicillin susceptible Staphylococcus aureus and Enterococcus faecalis.  The patient was treated with IV Unasyn.  He was supposed to receive 6 weeks of treatment.  Apparently his wound care doctor increase the duration and patient was on antibiotics on admission.  The wife stated that his Matos catheter was replaced last time 2 weeks ago.  His urinalysis was abnormal and urine cultures are growing Candida.  The patient had no fever since admission.  He appears to be hemodynamically stable.  The patient is known to be bedridden for long duration secondary to advanced multiple sclerosis.  The patient has a chronic indwelling Matos catheter.    Review of Systems   Review of Systems   Unable to perform ROS: Mental status change   Constitutional: Positive for fever.   Skin: Positive for wound.       Medications  Medications Prior to Admission   Medication Sig Dispense Refill Last Dose   • acetaminophen (TYLENOL) 500 MG tablet Take 500 mg by mouth Every 6 (Six) Hours As Needed for Mild Pain .      • amLODIPine (NORVASC) 10 MG tablet Take 1 tablet by mouth Every Evening.   6/2/2021 at 0900   • ampicillin-sulbactam (UNASYN) 3 gm IVPB in 100 mL NS (MBP) Infuse 3 g into a venous catheter Every 6 (Six) Hours.   6/3/2021 at 0500   • aspirin-dipyridamole (AGGRENOX)  MG per 12 hr  capsule Take 1 capsule by mouth Every 12 (Twelve) Hours.   6/3/2021 at 0800   • gabapentin (NEURONTIN) 600 MG tablet Take 1 tablet by mouth 2 (two) times a day.   6/3/2021 at 0900   • omeprazole (priLOSEC) 20 MG capsule Take 1 capsule by mouth Daily.   6/3/2021 at 0900   • sertraline (ZOLOFT) 50 MG tablet Take 50 mg by mouth every night at bedtime.   6/2/2021 at Unknown time       History  Past Medical History:   Diagnosis Date   • Benign essential hypertension 4/1/2021   • Cerebrovascular accident (CVA) (CMS/[facility]) 2008   • Chronic indwelling Matos catheter    • Fever 06/03/2021   • Hyperlipidemia 4/1/2021   • Multiple sclerosis (CMS/[facility]) 4/1/2021   • Neurogenic bladder    • Sacral pressure sore    • Sleep apnea    • Stool incontinence    • Type 2 diabetes mellitus (CMS/[facility]) 4/1/2021     Past Surgical History:   Procedure Laterality Date   • DEBRIDEMENT OF ISCHEAL ULCER/BUTTOCKS WOUND N/A 4/2/2021    Procedure: DEBRIDEMENT OF ISCHEAL ULCER/BUTTOCKS WOUND;  Surgeon: Abiodun Bonner MD;  Location: [facility] MAIN OR;  Service: General;  Laterality: N/A;   • ENDOSCOPY N/A 4/2/2021    Procedure: ESOPHAGOGASTRODUODENOSCOPY with balloon dilatation to 18cm at the GE junction and pyloris;  Surgeon: Shanda Drew MD;  Location: [facility] ENDOSCOPY;  Service: Gastroenterology;  Laterality: N/A;  gastro jejunal stricture       Family History  Family History   Problem Relation Age of Onset   • No Known Problems Mother    • No Known Problems Father        Social History   reports that he has never smoked. He has never used smokeless tobacco. He reports previous alcohol use. He reports that he does not use drugs.    Allergies  Patient has no known allergies.    Objective     Vital Signs   Vital Signs (last 24 hours)       06/03 0700  -  06/04 0659 06/04 0700  -  06/04 1611   Most Recent    Temp (°F) 97.6 -  97.8      97.5     97.5 (36.4)    Heart Rate 93 -  115    73 -  80     73    Resp 18 -  21      18     18    BP 98/68 -   140/89    102/62 -  113/64     102/62    SpO2 (%) 94 -  96      96     96          Physical Exam:  Physical Exam  Vitals and nursing note reviewed.   Constitutional:       Appearance: He is well-developed.      Comments: Awake but nonverbal   HENT:      Head: Normocephalic and atraumatic.   Eyes:      Pupils: Pupils are equal, round, and reactive to light.   Cardiovascular:      Rate and Rhythm: Normal rate and regular rhythm.      Heart sounds: Normal heart sounds.   Pulmonary:      Effort: Pulmonary effort is normal. No respiratory distress.      Breath sounds: Normal breath sounds. No wheezing or rales.   Abdominal:      General: Bowel sounds are normal. There is no distension.      Palpations: Abdomen is soft. There is no mass.      Tenderness: There is no abdominal tenderness. There is no guarding or rebound.   Musculoskeletal:         General: Deformity present.      Comments: Contractions of the upper and lower extremities superficial wounds involving multiple digits of the lower extremities   Skin:     General: Skin is warm.      Findings: No erythema or rash.      Comments: Stage IV sacral decubitus ulceration with no obvious purulent drainage or signs of infection.  Patient currently has a wound VAC   Neurological:      Mental Status: He is disoriented.         Microbiology  Microbiology Results (last 10 days)     Procedure Component Value - Date/Time    Urine Culture - Urine, Urine, Catheter [[id number]]  (Abnormal) Collected: 06/03/21 1416    Lab Status: Preliminary result Specimen: Urine, Catheter Updated: 06/04/21 1038     Urine Culture Yeast isolated     Comment: No further work up.       Blood Culture - Blood, Arm, Left [[id number]] Collected: 06/03/21 1411    Lab Status: Preliminary result Specimen: Blood from Arm, Left Updated: 06/04/21 1430     Blood Culture No growth at 24 hours    Blood Culture - Blood, Blood, Central Line [[id number]] Collected: 06/03/21 1411    Lab Status: Preliminary result  Specimen: Blood, Central Line Updated: 06/04/21 1430     Blood Culture No growth at 24 hours    Respiratory Panel PCR w/COVID-19(SARS-CoV-2) VICKY/TERESA/ALFIE/PAD/COR/MAD/DAMARIS In-House, NP Swab in UTM/VTM, 3-4 HR TAT - Swab, Nasopharynx [[id number]]  (Normal) Collected: 06/03/21 1404    Lab Status: Final result Specimen: Swab from Nasopharynx Updated: 06/03/21 1458     ADENOVIRUS, PCR Not Detected     Coronavirus 229E Not Detected     Coronavirus HKU1 Not Detected     Coronavirus NL63 Not Detected     Coronavirus OC43 Not Detected     COVID19 Not Detected     Human Metapneumovirus Not Detected     Human Rhinovirus/Enterovirus Not Detected     Influenza A PCR Not Detected     Influenza B PCR Not Detected     Parainfluenza Virus 1 Not Detected     Parainfluenza Virus 2 Not Detected     Parainfluenza Virus 3 Not Detected     Parainfluenza Virus 4 Not Detected     RSV, PCR Not Detected     Bordetella pertussis pcr Not Detected     Bordetella parapertussis PCR Not Detected     Chlamydophila pneumoniae PCR Not Detected     Mycoplasma pneumo by PCR Not Detected    Narrative:      In the setting of a positive respiratory panel with a viral infection PLUS a negative procalcitonin without other underlying concern for bacterial infection, consider observing off antibiotics or discontinuation of antibiotics and continue supportive care. If the respiratory panel is positive for atypical bacterial infection (Bordetella pertussis, Chlamydophila pneumoniae, or Mycoplasma pneumoniae), consider antibiotic de-escalation to target atypical bacterial infection.          Laboratory  Results from last 7 days   Lab Units 06/04/21  1041   WBC 10*3/mm3 16.20*   HEMOGLOBIN g/dL 10.4*   HEMATOCRIT % 33.7*   PLATELETS 10*3/mm3 389     Results from last 7 days   Lab Units 06/04/21  0359   SODIUM mmol/L 148*   POTASSIUM mmol/L 3.6   CHLORIDE mmol/L 112*   CO2 mmol/L 26.0   BUN mg/dL 43*   CREATININE mg/dL 0.73*   GLUCOSE mg/dL 145*   CALCIUM mg/dL 8.5*      Results from last 7 days   Lab Units 06/04/21  0359   SODIUM mmol/L 148*   POTASSIUM mmol/L 3.6   CHLORIDE mmol/L 112*   CO2 mmol/L 26.0   BUN mg/dL 43*   CREATININE mg/dL 0.73*   GLUCOSE mg/dL 145*   CALCIUM mg/dL 8.5*                   Radiology  Imaging Results (Last 72 Hours)     Procedure Component Value Units Date/Time    XR Chest 1 View [[id number]] Collected: 06/03/21 1422     Updated: 06/03/21 1427    Narrative:      DATE OF EXAM:  6/3/2021 1:51 PM     PROCEDURE:  XR CHEST 1 VW-     INDICATIONS:  Simple Sepsis Protocol       COMPARISON:  AP portable chest 4/1/2021.     TECHNIQUE:   Single radiographic view of the chest was obtained.     FINDINGS:  Stable cardiac enlargement. The cardiac silhouette projects to left of  midline, similar to prior examination. This obscures visualization of  left lower chest. Pulmonary vascular distribution is normal. Right neck  approach central line extends to the mid SVC level. No visible  pneumothorax. No acute osseous abnormality.       Impression:         1. Stable cardiomegaly which obscures the left lower chest. No acute  chest findings.     Electronically Signed By-Analia Smyth MD On:6/3/2021 2:24 PM  This report was finalized on [id number] by  Analia Smyth MD.          Cardiology      Results Review:  I have reviewed all clinical data, test, lab, and imaging results.       Schedule Meds  amLODIPine, 10 mg, Oral, Q PM  aspirin-dipyridamole, 1 capsule, Oral, Q12H  fluconazole, 200 mg, Oral, Q24H  gabapentin, 600 mg, Oral, Q12H  Tunde, 1 packet, Oral, BID  pantoprazole, 40 mg, Oral, QAM  povidone-iodine, , Topical, Daily  sertraline, 50 mg, Oral, Daily  sodium chloride, 10 mL, Intravenous, Q12H        Infusion Meds       PRN Meds  •  acetaminophen  •  sodium chloride  •  sodium chloride      Assessment/Plan       Assessment    Candiduria/UTI.  Patient has chronic indwelling Matos catheter secondary to urinary retention and neurogenic bladder    Stage IV sacral  decubitus ulceration.  Patient was treated with long course of IV antibiotics.  We initially started patient on Unasyn 8 weeks ago for 6 weeks secondary to infection with MSSA and Enterococcus faecalis but apparently his wound care doctor increase the duration.  The patient currently has a PICC line  Currently the wound does not appear to be grossly infected and has wound VAC on    The patient presented with fever at home he has been afebrile here.  Blood cultures including 1 set from the PICC line are negative    Neurogenic bladder with chronic indwelling Matos catheter    The patient is bedridden for long time secondary to advanced multiple sclerosis        Plan    Discontinue IV vancomycin and cefepime  Start fluconazole 200 mg p.o. daily for 10 days  Replace the current Matos catheter  Continue wound care, patient currently has wound VAC on  The case was discussed with the patient's wife at the bedside  We can discontinue the current PICC line on discharge    Marck Hurley MD  06/04/21  16:11 EDT      Note is dictated utilizing voice recognition software/Dragon

## 2021-06-04 NOTE — PLAN OF CARE
Problem: Adult Inpatient Plan of Care  Goal: Absence of Hospital-Acquired Illness or Injury  Intervention: Identify and Manage Fall Risk  Recent Flowsheet Documentation  Taken 6/3/2021 2330 by Evans Moody LPN  Safety Promotion/Fall Prevention:   toileting scheduled   safety round/check completed   nonskid shoes/slippers when out of bed   muscle strengthening facilitated   mobility aid in reach   room organization consistent   lighting adjusted   activity supervised   assistive device/personal items within reach   clutter free environment maintained   fall prevention program maintained  Taken 6/3/2021 2102 by Evans Moody LPN  Safety Promotion/Fall Prevention:   safety round/check completed   room organization consistent   nonskid shoes/slippers when out of bed   muscle strengthening facilitated   mobility aid in reach   lighting adjusted   fall prevention program maintained   clutter free environment maintained   assistive device/personal items within reach   activity supervised  Intervention: Prevent Skin Injury  Recent Flowsheet Documentation  Taken 6/3/2021 2330 by Evans Moody LPN  Body Position: position changed independently  Taken 6/3/2021 2102 by Evans Moody LPN  Body Position: position changed independently  Intervention: Prevent Infection  Recent Flowsheet Documentation  Taken 6/3/2021 2102 by Evans Moody LPN  Infection Prevention:   visitors restricted/screened   rest/sleep promoted   single patient room provided   personal protective equipment utilized   hand hygiene promoted     Problem: Fall Injury Risk  Goal: Absence of Fall and Fall-Related Injury  Intervention: Promote Injury-Free Environment  Recent Flowsheet Documentation  Taken 6/3/2021 2330 by Evans Mooyd LPN  Safety Promotion/Fall Prevention:   toileting scheduled   safety round/check completed   nonskid shoes/slippers when out of bed   muscle strengthening facilitated   mobility aid in  reach   room organization consistent   lighting adjusted   activity supervised   assistive device/personal items within reach   clutter free environment maintained   fall prevention program maintained  Taken 6/3/2021 2102 by Evans Moody LPN  Safety Promotion/Fall Prevention:   safety round/check completed   room organization consistent   nonskid shoes/slippers when out of bed   muscle strengthening facilitated   mobility aid in reach   lighting adjusted   fall prevention program maintained   clutter free environment maintained   assistive device/personal items within reach   activity supervised     Problem: Skin Injury Risk Increased  Goal: Skin Health and Integrity  Intervention: Optimize Skin Protection  Recent Flowsheet Documentation  Taken 6/3/2021 2330 by Evans Moody LPN  Head of Bed (HOB): HOB at 20-30 degrees  Taken 6/3/2021 2102 by Evans Moody LPN  Head of Bed (HOB): HOB at 30-45 degrees   Goal Outcome Evaluation:

## 2021-06-04 NOTE — PLAN OF CARE
Goal Outcome Evaluation:         Patient has rested in bed this shift. Wife at bedside. No complaints of pain or discomfort. Has a wound vac to coccyx wound. Turning every 2 hours. IV antibiotics discontinued per ID. Will continue to monitor.

## 2021-06-04 NOTE — PROGRESS NOTES
Wound Initial Evaluation   VICTOR HUGO     Patient Name: Vicente Singh  : 1940  MRN: [id number]  Today's Date: 2021 Room Number: 368/1      Admit Date: 6/3/2021  Attending: Brock Alford MD    Consult Requested By: Dr Alford    Reason For Consult: multiple wounds    Chief Complaint: 80-year-old male presents to the hospital with complaints of fever.  Patient is nonverbal has a history of MS.  Patient was recently here in the hospital in April for an infected pressure injury which required I&D.  He is current with outpatient wound care P.  The EMR was reviewed and wound center notes reviewed.  Per family member at the bedside wound VAC being changed 3 times a week by home health.  Patient surface is a gel mattress overlay.    Visit Dx:    ICD-10-CM ICD-9-CM   1. Fever, unspecified fever cause  R50.9 780.60   2. Leukocytosis, unspecified type  D72.829 288.60   3. Sepsis, due to unspecified organism, unspecified whether acute organ dysfunction present (CMS/[facility])  A41.9 038.9     995.91     Patient Active Problem List   Diagnosis   • Benign essential hypertension   • Hyperlipidemia   • Multiple sclerosis (CMS/HCC)   • Type 2 diabetes mellitus (CMS/HCC)   • Sacral pressure ulcer   • GERD without esophagitis   • History of CVA (cerebrovascular accident)   • Neurogenic bladder   • Mild malnutrition (CMS/HCC)   • Bowel incontinence   • Pressure injury of sacral region, unstageable (CMS/HCC)   • Dysphagia   • Fever       History:   Past Medical History:   Diagnosis Date   • Benign essential hypertension 2021   • Cerebrovascular accident (CVA) (CMS/HCC)    • Chronic indwelling Matos catheter    • Fever 2021   • Hyperlipidemia 2021   • Multiple sclerosis (CMS/HCC) 2021   • Neurogenic bladder    • Sacral pressure sore    • Sleep apnea    • Stool incontinence    • Type 2 diabetes mellitus (CMS/HCC) 2021     Past Surgical History:   Procedure Laterality Date   • DEBRIDEMENT OF ISCHEAL  ULCER/BUTTOCKS WOUND N/A 4/2/2021    Procedure: DEBRIDEMENT OF ISCHEAL ULCER/BUTTOCKS WOUND;  Surgeon: Abiodun Bonner MD;  Location: [facility] MAIN OR;  Service: General;  Laterality: N/A;   • ENDOSCOPY N/A 4/2/2021    Procedure: ESOPHAGOGASTRODUODENOSCOPY with balloon dilatation to 18cm at the GE junction and pyloris;  Surgeon: Shanda Drew MD;  Location: [facility] ENDOSCOPY;  Service: Gastroenterology;  Laterality: N/A;  gastro jejunal stricture     Social History     Socioeconomic History   • Marital status:      Spouse name: Not on file   • Number of children: Not on file   • Years of education: Not on file   • Highest education level: Not on file   Tobacco Use   • Smoking status: Never Smoker   • Smokeless tobacco: Never Used   Vaping Use   • Vaping Use: Never used   Substance and Sexual Activity   • Alcohol use: Not Currently   • Drug use: Never   • Sexual activity: Defer       Allergies:  No Known Allergies    Medications:    Current Facility-Administered Medications:   •  acetaminophen (TYLENOL) tablet 500 mg, 500 mg, Oral, Q6H PRN, Brock Alford MD  •  amLODIPine (NORVASC) tablet 10 mg, 10 mg, Oral, Q PM, Brock Alford MD, 10 mg at 06/03/21 2200  •  aspirin-dipyridamole (AGGRENOX)  MG per 12 hr capsule 1 capsule, 1 capsule, Oral, Q12H, Brock Alford MD, 1 capsule at 06/03/21 2200  •  cefepime 2 gm IVPB in 100 ml NS (MBP), 2 g, Intravenous, Q8H, Brock Alford MD, Stopped at 06/04/21 0022  •  gabapentin (NEURONTIN) tablet 600 mg, 600 mg, Oral, Q12H, Brock Alford MD, 600 mg at 06/03/21 2200  •  pantoprazole (PROTONIX) EC tablet 40 mg, 40 mg, Oral, QAM, Brock Alford MD, 40 mg at 06/04/21 0604  •  povidone-iodine (BETADINE) external solution, , Topical, Daily, Lyubov Nguyen APRN  •  sertraline (ZOLOFT) tablet 50 mg, 50 mg, Oral, Daily, Brock Alford MD, 50 mg at 06/03/21 2200  •  sodium chloride 0.9 % flush 10 mL, 10 mL, Intravenous, PRN,  Brock Alford MD  •  sodium chloride 0.9 % flush 10 mL, 10 mL, Intravenous, Q12H, Brock Alford MD, 10 mL at 06/03/21 2000  •  sodium chloride 0.9 % flush 10 mL, 10 mL, Intravenous, PRN, Brock Alford MD  •  vancomycin (VANCOCIN) 1,000 mg in sodium chloride 0.9 % 250 mL IVPB, 1,000 mg, Intravenous, Q12H, Brock Alford MD, 1,000 mg at 06/04/21 0359    Results Review:  Lab Results (last 48 hours)     Procedure Component Value Units Date/Time    Basic Metabolic Panel [[id number]]  (Abnormal) Collected: 06/04/21 0359    Specimen: Blood Updated: 06/04/21 0556     Glucose 145 mg/dL      BUN 43 mg/dL      Creatinine 0.73 mg/dL      Sodium 148 mmol/L      Potassium 3.6 mmol/L      Chloride 112 mmol/L      CO2 26.0 mmol/L      Calcium 8.5 mg/dL      eGFR Non African Amer 104 mL/min/1.73      BUN/Creatinine Ratio 58.9     Anion Gap 10.0 mmol/L     Narrative:      GFR Normal >60  Chronic Kidney Disease <60  Kidney Failure <15      Phosphorus [[id number]]  (Normal) Collected: 06/04/21 0359    Specimen: Blood Updated: 06/04/21 0556     Phosphorus 2.5 mg/dL     Magnesium [[id number]]  (Normal) Collected: 06/04/21 0359    Specimen: Blood Updated: 06/04/21 0556     Magnesium 2.1 mg/dL     Phosphorus [[id number]]  (Normal) Collected: 06/03/21 1411    Specimen: Blood Updated: 06/03/21 1856     Phosphorus 3.1 mg/dL     Magnesium [[id number]]  (Normal) Collected: 06/03/21 1411    Specimen: Blood Updated: 06/03/21 1856     Magnesium 2.4 mg/dL     [place] Draw [[id number]] Collected: 06/03/21 1411    Specimen: Blood Updated: 06/03/21 1515    Narrative:      The following orders were created for panel order [place] Draw.  Procedure                               Abnormality         Status                     ---------                               -----------         ------                     Light Blue Top[[id number]]                                   Final result               Green Top (Gel)[[id number]]                                   Final result               Lavender Top[[id number]]                                                                Gold Top - SST[[id number]]                                   Final result                 Please view results for these tests on the individual orders.    Green Top (Gel) [[id number]] Collected: 06/03/21 1411    Specimen: Blood Updated: 06/03/21 1515     Extra Tube Hold for add-ons.     Comment: Auto resulted.       Light Blue Top [[id number]] Collected: 06/03/21 1411    Specimen: Blood Updated: 06/03/21 1515     Extra Tube hold for add-on     Comment: Auto resulted       Gold Top - SST [[id number]] Collected: 06/03/21 1411    Specimen: Blood Updated: 06/03/21 1515     Extra Tube Hold for add-ons.     Comment: Auto resulted.       Respiratory Panel PCR w/COVID-19(SARS-CoV-2) VICKY/TERESA/ALFIE/PAD/COR/MAD/DAMARIS In-House, NP Swab in UTM/VTM, 3-4 HR TAT - Swab, Nasopharynx [[id number]]  (Normal) Collected: 06/03/21 1404    Specimen: Swab from Nasopharynx Updated: 06/03/21 1458     ADENOVIRUS, PCR Not Detected     Coronavirus 229E Not Detected     Coronavirus HKU1 Not Detected     Coronavirus NL63 Not Detected     Coronavirus OC43 Not Detected     COVID19 Not Detected     Human Metapneumovirus Not Detected     Human Rhinovirus/Enterovirus Not Detected     Influenza A PCR Not Detected     Influenza B PCR Not Detected     Parainfluenza Virus 1 Not Detected     Parainfluenza Virus 2 Not Detected     Parainfluenza Virus 3 Not Detected     Parainfluenza Virus 4 Not Detected     RSV, PCR Not Detected     Bordetella pertussis pcr Not Detected     Bordetella parapertussis PCR Not Detected     Chlamydophila pneumoniae PCR Not Detected     Mycoplasma pneumo by PCR Not Detected    Narrative:      In the setting of a positive respiratory panel with a viral infection PLUS a negative procalcitonin without other underlying concern for bacterial infection, consider observing off antibiotics or discontinuation of  antibiotics and continue supportive care. If the respiratory panel is positive for atypical bacterial infection (Bordetella pertussis, Chlamydophila pneumoniae, or Mycoplasma pneumoniae), consider antibiotic de-escalation to target atypical bacterial infection.    Urinalysis, Microscopic Only - Urine, Catheter [[id number]]  (Abnormal) Collected: 06/03/21 1416    Specimen: Urine, Catheter Updated: 06/03/21 1457     RBC, UA 0-2 /HPF      WBC, UA 6-12 /HPF      Bacteria, UA None Seen /HPF      Squamous Epithelial Cells, UA 0-2 /HPF      Yeast, UA Small/1+ Budding Yeast /HPF      Hyaline Casts, UA None Seen /LPF      Methodology Manual Light Microscopy    Urine Culture - Urine, Urine, Catheter [[id number]] Collected: 06/03/21 1416    Specimen: Urine, Catheter Updated: 06/03/21 1457    Comprehensive Metabolic Panel [[id number]]  (Abnormal) Collected: 06/03/21 1411    Specimen: Blood Updated: 06/03/21 1454     Glucose 125 mg/dL      BUN 49 mg/dL      Creatinine 0.79 mg/dL      Sodium 149 mmol/L      Potassium 3.6 mmol/L      Chloride 110 mmol/L      CO2 26.0 mmol/L      Calcium 9.2 mg/dL      Total Protein 7.6 g/dL      Albumin 3.30 g/dL      ALT (SGPT) 105 U/L      AST (SGOT) 76 U/L      Alkaline Phosphatase 91 U/L      Total Bilirubin 0.3 mg/dL      eGFR Non African Amer 95 mL/min/1.73      Globulin 4.3 gm/dL      A/G Ratio 0.8 g/dL      BUN/Creatinine Ratio 62.0     Anion Gap 13.0 mmol/L     Narrative:      GFR Normal >60  Chronic Kidney Disease <60  Kidney Failure <15      Urinalysis With Culture If Indicated - Urine, Catheter [[id number]]  (Abnormal) Collected: 06/03/21 1416    Specimen: Urine, Catheter Updated: 06/03/21 1447     Color, UA Yellow     Appearance, UA Clear     pH, UA 5.5     Specific Gravity, UA 1.024     Glucose, UA Negative     Ketones, UA Trace     Bilirubin, UA Negative     Blood, UA Small (1+)     Protein, UA 30 mg/dL (1+)     Leuk Esterase, UA Moderate (2+)     Nitrite, UA Negative     Urobilinogen,  UA 0.2 E.U./dL    CBC & Differential [[id number]]  (Abnormal) Collected: 06/03/21 1411    Specimen: Blood Updated: 06/03/21 1434    Narrative:      The following orders were created for panel order CBC & Differential.  Procedure                               Abnormality         Status                     ---------                               -----------         ------                     Scan Slide[[id number]]                                                                  CBC Auto Differential[[id number]]        Abnormal            Final result                 Please view results for these tests on the individual orders.    CBC Auto Differential [[id number]]  (Abnormal) Collected: 06/03/21 1411    Specimen: Blood Updated: 06/03/21 1434     WBC 15.00 10*3/mm3      RBC 5.21 10*6/mm3      Hemoglobin 12.0 g/dL      Hematocrit 37.4 %      MCV 71.7 fL      MCH 23.1 pg      MCHC 32.2 g/dL      RDW 18.0 %      RDW-SD 45.1 fl      MPV 8.3 fL      Platelets 469 10*3/mm3      Neutrophil % 75.9 %      Lymphocyte % 14.6 %      Monocyte % 7.9 %      Eosinophil % 1.3 %      Basophil % 0.3 %      Neutrophils, Absolute 11.40 10*3/mm3      Lymphocytes, Absolute 2.20 10*3/mm3      Monocytes, Absolute 1.20 10*3/mm3      Eosinophils, Absolute 0.20 10*3/mm3      Basophils, Absolute 0.00 10*3/mm3      nRBC 0.0 /100 WBC     Blood Culture - Blood, Arm, Left [[id number]] Collected: 06/03/21 1411    Specimen: Blood from Arm, Left Updated: 06/03/21 1426    Blood Culture - Blood, Blood, Central Line [[id number]] Collected: 06/03/21 1411    Specimen: Blood, Central Line Updated: 06/03/21 1426    POC Lactate [[id number]]  (Normal) Collected: 06/03/21 1419    Specimen: Blood Updated: 06/03/21 1419     Lactate 0.7 mmol/L      Comment: Serial Number: [id number]:  [id number]           Imaging Results (Last 72 Hours)     Procedure Component Value Units Date/Time    XR Chest 1 View [[id number]] Collected: 06/03/21 1422     Updated: 06/03/21 1427     Narrative:      DATE OF EXAM:  6/3/2021 1:51 PM     PROCEDURE:  XR CHEST 1 VW-     INDICATIONS:  Simple Sepsis Protocol       COMPARISON:  AP portable chest 4/1/2021.     TECHNIQUE:   Single radiographic view of the chest was obtained.     FINDINGS:  Stable cardiac enlargement. The cardiac silhouette projects to left of  midline, similar to prior examination. This obscures visualization of  left lower chest. Pulmonary vascular distribution is normal. Right neck  approach central line extends to the mid SVC level. No visible  pneumothorax. No acute osseous abnormality.       Impression:         1. Stable cardiomegaly which obscures the left lower chest. No acute  chest findings.     Electronically Signed By-Analia Smyth MD On:6/3/2021 2:24 PM  This report was finalized on [id number] by  Analia Smyth MD.          Review of Systems:  Review of Systems   Unable to perform ROS: Patient nonverbal       Physical Assessment:  Wound 04/02/21 0643 sacral spine (Active)                                                                                                                                                                                                                           Stage IV sacral pressure injury: Full-thickness injury to the sacrum there is some bone exposed however it appears to be granulating over well.  There is undermining to the wound approximate size of the wound is 4 x 4-1/2 cm with a depth of 0.8 cm there is undermining 3 much complete of 4 cm as well.  No odor is noted no erythema no warmth or induration is noted however there are multiple full-thickness device related pressure injury surrounding the wound.  Track pad to the wound VAC is directly over the wound and the tubing has caused numerous areas of pressure.    The wound was irrigated well with normal saline and dried Skin-Prep was applied to the periwound skin calcium alginate dressing was applied over the device related injury  surrounding the wound.  The wound was filled with black foam drape was used to seal off and opening was cut dime sized in the foam and placed a larger area which extended over the undermining of black foam instilled this with drape as well another hole was cut the wound was then bridged and suction set at 125 continuous.    Stage III pressure injury wound wound [place] there is actually several areas approximate size clustering the wounds together is 5 x 5 cm.  Depth 0.2 cm.  There is some slough to the base of the wound approximately 40-45%.  There is a moderate amount of serosanguineous exudate noted there are no overt symptoms of infection noted.    Stage I right trochanter pressure injury area of nonblanchable erythema is noted over the right trochanter it is not open there is no induration or warmth noted from the site.    Bilateral feet have multiple abrasions to the toes into the left dorsal foot the areas are stable in appearance they are dry hard and clean no exudate noted no erythema induration warmth noted from the area    Final diagnosis  Stage IV sacral pressure injury  Stage III left hip pressure injury  Stage I right hip pressure injury  Nonpressure injury with fat layer exposed to the left foot  Nonpressure injury with fat layer exposed to the right foot    Recommendation and Plan  Patient presents with multiple pressure injuries the wounds appear to be stable at this time however of concern is the device related injuries which are new over the coccyx wound it will be necessary and important to offload the area and bridged off of the wound to not cause further damage.  Also patient likely needs a lower loss surface at home he does not have an intact turning surface at this time.  We will continue wound VAC with bolstering technique and bridging the wound.  Will use black foam and lieu of white foam.  Paint the toes with Betadine these all appear to be stable.  In the bilateral trochanter injuries can  be treated with silicone foam border dressings and changed every 3 days.  Patient will need home health when he leaves and he will need to follow-up with outpatient wound care    JACINDA Morales   6/4/2021   09:06 EDT

## 2021-06-04 NOTE — CONSULTS
Nutrition Services    Patient Name: Vicente Singh  YOB: 1940  MRN: [id number]  Admission date: 6/3/2021    Comment:    Moderate chronic disease/condition related malnutrition r/t inadequate energy intakes in the context of multiple sclerosis AEB < 75% of estimated energy intakes for > 1 month & muscle/fat wasting as noted on physical exam.     Starting multiple oral nutritional supplements to promote calorie/protein intake & wound healing:    Boost Plus BID (720 kcals, 28 g protein if consumed)  Magic cups at lunch/dinner (580 kcals, 18 g protein if consumed)  Tunde BID     PPE Documentation        PPE Worn By Provider Mask, Eye Protection, Gloves    PPE Worn By Patient  None      CLINICAL NUTRITION ASSESSMENT      Reason for Assessment 6/4: Nursing Admission Screen, Pressure injuries      H&P:      Past Medical History:   Diagnosis Date   • Benign essential hypertension 4/1/2021   • Cerebrovascular accident (CVA) (CMS/HCC) 2008   • Chronic indwelling Matos catheter    • Fever 06/03/2021   • Hyperlipidemia 4/1/2021   • Multiple sclerosis (CMS/[facility]) 4/1/2021   • Neurogenic bladder    • Sacral pressure sore    • Sleep apnea    • Stool incontinence    • Type 2 diabetes mellitus (CMS/[facility]) 4/1/2021       Past Surgical History:   Procedure Laterality Date   • DEBRIDEMENT OF ISCHEAL ULCER/BUTTOCKS WOUND N/A 4/2/2021    Procedure: DEBRIDEMENT OF ISCHEAL ULCER/BUTTOCKS WOUND;  Surgeon: Abiodun Bonner MD;  Location: [facility] MAIN OR;  Service: General;  Laterality: N/A;   • ENDOSCOPY N/A 4/2/2021    Procedure: ESOPHAGOGASTRODUODENOSCOPY with balloon dilatation to 18cm at the GE junction and pyloris;  Surgeon: Shanda Drew MD;  Location: [facility] ENDOSCOPY;  Service: Gastroenterology;  Laterality: N/A;  gastro jejunal stricture        Current Problems:   No MD notes available at time of assessment    Per ED notes admitted for fever, leukocytosis and sepsis. Past medical history significant for  "multiple sclerosis.         Nutrition/Diet History         Narrative     6/4: Visited patient this am with patient's wife present. RN in the room as well. Patient not able to complete interview. Wife present who reports patient's had a decreased appetite x 1 year in duration. She reports at times patient just \"moves food around in his mouth\" and doesn't swallow it, however seems to do okay on mechanically altered diet. Wife did not want a ST consult, however RN is to monitor patient's need for ST involvement. She reports patients UBW of 143 lbs and patient's CBW is 136 lbs. She is open to Boost Plus & magic cups to promote calorie/protein intakes, as well as Tunde for wound healing. Given decreased energy intakes in the context of MS, do feel patient's NFPE was significant for malnutrition.      Functional Status Completely dependent for ADLs.      Food Allergies NKFA      Factors Affecting   Nutritional Intake MS      Anthropometrics        Current Height, Weight Height: 180.3 cm (71\")  Weight: 62 kg (136 lb 11 oz) (06/04/21 0532)        Admit Height, Weight -    Flowsheet Rows      First Filed Value   Admission Height  180.3 cm (71\") Documented at 06/03/2021 1220   Admission Weight  64.9 kg (143 lb) Documented at 06/03/2021 1220             Ideal Body Weight (IBW) 172 lb    % Ideal Body Weight 79%        Usual Body Weight 143 lbs per patient's wife    % Usual Body Weight 95%    Wt Change Observation Current Admission:    6/4: Weight is down 7 lbs since admission, Highly question accuracy. Patient appears in the 130's or less.     PTA:  Since April weight in the 130's-140's    Weight Hx    Wt Readings from Last 30 Encounters:   06/04/21 0532 62 kg (136 lb 11 oz)   06/03/21 2008 64.6 kg (142 lb 8 oz)   06/03/21 1220 64.9 kg (143 lb)   04/05/21 0318 65.4 kg (144 lb 2.9 oz)   04/04/21 0307 63.9 kg (140 lb 14 oz)   04/03/21 0304 63.4 kg (139 lb 12.4 oz)   04/02/21 0536 62.6 kg (138 lb 0.1 oz)   04/01/21 1645 60.8 kg " "(134 lb)        BMI kg/m2 Body mass index is 19.06 kg/m².       Labs/Medications         Pertinent Labs -   Results from last 7 days   Lab Units 06/04/21  0359 06/03/21  1411   SODIUM mmol/L 148* 149*   POTASSIUM mmol/L 3.6 3.6   CHLORIDE mmol/L 112* 110*   CO2 mmol/L 26.0 26.0   BUN mg/dL 43* 49*   CREATININE mg/dL 0.73* 0.79   CALCIUM mg/dL 8.5* 9.2   BILIRUBIN mg/dL  --  0.3   ALK PHOS U/L  --  91   ALT (SGPT) U/L  --  105*   AST (SGOT) U/L  --  76*   GLUCOSE mg/dL 145* 125*     Results from last 7 days   Lab Units 06/04/21  1041 06/04/21  0359 06/03/21  1411   MAGNESIUM mg/dL  --  2.1 2.4   PHOSPHORUS mg/dL  --  2.5 3.1   HEMOGLOBIN g/dL 10.4*  --  12.0*   HEMATOCRIT % 33.7*  --  37.4*     COVID19   Date Value Ref Range Status   06/03/2021 Not Detected Not Detected - Ref. Range Final     No results found for: HGBA1C      Pertinent Medications Norvasc, Cefepime, Neurontin, Protonix, Zoloft, Vancomycin,      Physical Findings        Overall Physical   Appearance, MSA 6/4: NFPE completed consistent with malnutrition    --  Edema  None documented      Gastrointestinal + BM 6/4      Tubes No feeding tubes      Oral/Mouth Cavity Requires textured modified diet.      Skin Per wound RN on 6/4/21     Stage IV sacral pressure injury  Stage III left hip pressure injury  Stage I right hip pressure injury  Nonpressure injury with fat layer exposed to the left foot  Nonpressure injury with fat layer exposed to the right foot       Estimated/Assessed Needs       Energy Requirements    Height for Calculation  Height: 180.3 cm (71\")   Weight for Calculation -   Method for Estimation  -   EST Needs (kcal/day) -       Protein Requirements    Weight for Calculation 62 kg    EST Protein Needs (g/kg) 1.5-2 g/kg    EST Daily Needs (g/day)  g        Fluid Requirements     Estimated Needs (mL/day) -       Fluid Deficit    Current Na Level (mEq/L) -   Desired Na Level (mEq/L) -   Estimated Fluid Deficit (L)  -     Current Nutrition " Orders & Evaluation of Intake       Oral Nutrition     Current PO Diet Diet Texture; Mechanical Ground   Supplement None    PO Evaluation     % PO Intake 6/4: 50% x 1 meal recorded    --  Clinical Course    Nutrition Course Details PO diet    6/3 to present (6/4) Mechanical ground      Nutritional Risk Screening        NRS-2002 Score   -       Nutrition Diagnosis         Nutrition Dx Problem 1 Moderate chronic disease/condition related malnutrition r/t inadequate energy intakes in the context of multiple sclerosis AEB < 75% of estimated energy intakes for > 1 month & muscle/fat wasting as noted on physical exam.       Nutrition Dx Problem 2 Increased nutrient needs (protein) r/t increased demand for wound healing AEB patient with several areas of skin impairment & advanced pressure injuries.        Intervention Goal         Intervention Goal(s) PO intakes to be > 50% of meals    Patient to be accepting of oral nutritional supplements      Nutrition Intervention        RD/Tech Action Starting ONS      Nutrition Prescription          Diet Prescription Mech Soft    Supplement Prescription Boost Plus BID (720 kcals, 28 g protein if consumed)  Magic cups at lunch/dinner (580 kcals, 18 g protein if consumed)  Tunde BID    --  Monitor/Evaluation        Monitor Weights, intakes, labs, BM and skin      Malnutrition Severity Assessment      Patient meets criteria for : Moderate (non-severe) Malnutrition     Malnutrition Type (last 8 hours)      Malnutrition Severity Assessment     Row Name 06/04/21 1356       Malnutrition Severity Assessment    Malnutrition Type  Chronic Disease - Related Malnutrition    Row Name 06/04/21 1356       Insufficient Energy Intake     Insufficient Energy Intake Findings  Moderate    Insufficient Energy Intake   <75% of est. energy requirement for > or equal to 1 month    Row Name 06/04/21 1356       Muscle Loss    Loss of Muscle Mass Findings  Moderate    Sabianism Region  Moderate - slight  depression    Clavicle Bone Region  None    Acromion Bone Region  Severe - squared shoulders, bones, and acromion process protrusion prominent    Scapular Bone Region  Moderate - mild depression, bones may show slightly    Dorsal Hand Region  Severe - prominent depression Contracted    Patellar Region  Severe - prominent bone, square looking, very little muscle definition    Anterior Thigh Region  Severe - line/depression along thigh, obviously thin    Row Name 06/04/21 9785       Fat Loss    Subcutaneous Fat Loss Findings  Moderate    Orbital Region   Moderate -  somewhat hollowness, slightly dark circles    Upper Arm Region  Moderate - some fat tissue, not ample    Thoracic & Lumbar Region  None    Row Name 06/04/21 1353       Criteria Met (Must meet criteria for severity in at least 2 of these categories: M Wasting, Fat Loss, Fluid, Secondary Signs, Wt. Status, Intake)    Patient meets criteria for   Moderate (non-severe) Malnutrition               Electronically signed by:  Ирина Schilling RD  06/04/21 13:36 EDT

## 2021-06-04 NOTE — PROGRESS NOTES
[facility] Daily Progress Note      Hospitalist Team  LOS 0 days      Patient Care Team:  Mickey Mullen APRN as PCP - General (Family Medicine)  Jessenia Pederson RN as Registered Nurse    Patient Location: 368/1      Subjective   Subjective     Chief Complaint / Subjective  Chief Complaint   Patient presents with   • Fever     fever reported 101 started 2 days not as awake or talking as normal.         Brief Synopsis of Hospital Course/HPI  80 y.o. white male with multimedical problems presents with fever.  Patient has a history of multiple sclerosis and is debilitated.  He was in the hospital in April for an infected decubitus ulcer.  He has a wound VAC in place at this time.  He also has a PICC line in place and is receiving IV Unasyn.  Wife states patient developed a fever 2 days ago up to 101.  Wife states patient has been sleepy and lethargic.  He has not been talking as much as usual.  He denies any complaints of pain.  He has had no cough or congestion.  Wife denies any vomiting or diarrhea.  No alleviating or exacerbating factors.     In ER WBC 15, Na 149. Lactate 0.7, T 97.8, P:115, bp 123/77  Cxr:Stable cardiomegaly which obscures the left lower chest. No acute  chest findings.  RVP-NEG      Date:6/4/21: Patient seen and examined in bed no acute distress, he is doing better today, vital signs stable, discussed with RN.  Family at bedside.        Review of Systems   Constitutional: Positive for decreased appetite and malaise/fatigue.   HENT: Negative.    Eyes: Negative.    Cardiovascular: Negative.    Respiratory: Negative.    Endocrine: Negative.    Hematologic/Lymphatic: Negative.    Skin: Positive for poor wound healing.   Musculoskeletal: Positive for muscle weakness.   Gastrointestinal: Negative.    Genitourinary: Negative.    Neurological: Positive for weakness.   Psychiatric/Behavioral: Negative.    Allergic/Immunologic: Negative.    All other systems  "reviewed and are negative.        Objective   Objective      Vital Signs  Temp:  [97.5 °F (36.4 °C)-97.7 °F (36.5 °C)] 97.5 °F (36.4 °C)  Heart Rate:  [] 80  Resp:  [18-21] 18  BP: (113-140)/(64-89) 113/64  Oxygen Therapy  SpO2: 96 %  Pulse Oximetry Type: Intermittent  Device (Oxygen Therapy): room air  Flowsheet Rows      First Filed Value   Admission Height  180.3 cm (71\") Documented at 06/03/2021 1220   Admission Weight  64.9 kg (143 lb) Documented at 06/03/2021 1220        Intake & Output (last 3 days)       06/01 0701 - 06/02 0700 06/02 0701 - 06/03 0700 06/03 0701 - 06/04 0700 06/04 0701 - 06/05 0700    P.O.   240     IV Piggyback   350     Total Intake(mL/kg)   590 (9.5)     Net   +590             Stool Unmeasured Occurrence   1 x         Lines, Drains & Airways    Active LDAs     Name:   Placement date:   Placement time:   Site:   Days:    CVC Double Lumen 04/05/21 Tunneled Right Internal jugular   04/05/21    1552    Internal jugular   59    Peripheral IV 06/03/21 1411 Left Wrist   06/03/21    1411    Wrist   1    Urethral Catheter   04/01/21    1500 unknown placement date and time     64                Physical Exam  Vitals and nursing note reviewed.   Constitutional:       General: He is not in acute distress.     Appearance: He is well-developed. He is ill-appearing. He is not toxic-appearing.   HENT:      Head: Normocephalic and atraumatic.      Nose: Nose normal. No congestion or rhinorrhea.      Mouth/Throat:      Mouth: Mucous membranes are moist.      Pharynx: No oropharyngeal exudate.   Eyes:      General:         Right eye: No discharge.      Extraocular Movements: Extraocular movements intact.      Conjunctiva/sclera: Conjunctivae normal.      Pupils: Pupils are equal, round, and reactive to light.   Neck:      Thyroid: No thyromegaly.      Vascular: No JVD.      Trachea: No tracheal deviation.   Cardiovascular:      Rate and Rhythm: Normal rate and regular rhythm.      Pulses: Normal " pulses.      Heart sounds: Normal heart sounds. No murmur heard.   No friction rub.   Pulmonary:      Effort: Pulmonary effort is normal. No respiratory distress.      Breath sounds: Normal breath sounds. No stridor.   Abdominal:      General: Bowel sounds are normal. There is no distension.      Palpations: Abdomen is soft. There is no mass.   Musculoskeletal:         General: No swelling or tenderness. Normal range of motion.      Cervical back: Normal range of motion and neck supple. No rigidity. No muscular tenderness.   Skin:     General: Skin is warm and dry.      Coloration: Skin is not jaundiced or pale.   Neurological:      General: No focal deficit present.      Mental Status: He is alert and oriented to person, place, and time. Mental status is at baseline.      Cranial Nerves: No cranial nerve deficit.      Sensory: No sensory deficit.      Motor: No abnormal muscle tone.   Psychiatric:         Mood and Affect: Mood normal.         Behavior: Behavior normal.         Thought Content: Thought content normal.         Judgment: Judgment normal.              Wounds (last 24 hours)      LDA Wound     Row Name 06/03/21 2018 06/03/21 2015 06/03/21 2013       Wound 04/01/21 1700 Right medial gluteal Degloving injury    Wound - Properties Group Placement Date: 04/01/21 -BJ Placement Time: 1700  -BJ Present on Hospital Admission: Y  -BJ Side: Right  -BJ Orientation: medial  -BJ Location: gluteal  -BJ Primary Wound Type: Degloving in  -BJ Stage, Pressure Injury : deep tissue injury  -BJ    Retired Wound - Properties Group Date first assessed: 04/01/21 -BJ Time first assessed: 1700  -BJ Present on Hospital Admission: Y  -BJ Side: Right  -BJ Location: gluteal  -BJ Primary Wound Type: Degloving in  -BJ       Wound 04/02/21 0643 sacral spine    Wound - Properties Group Placement Date: 04/02/21  -KK Placement Time: 0643 -KK Location: sacral spine  -KK Stage, Pressure Injury : unstageable  -KK    Retired Wound -  Properties Group Date first assessed: 04/02/21  -KK Time first assessed: 0643 -KK Location: sacral spine  -KK       Wound 04/02/21 0739 sacral spine Incision    Wound - Properties Group Placement Date: 04/02/21  -AM Placement Time: 0739  -AM Location: sacral spine  -AM Primary Wound Type: Incision  -AM    Retired Wound - Properties Group Date first assessed: 04/02/21  -AM Time first assessed: 0739  -AM Location: sacral spine  -AM Primary Wound Type: Incision  -AM       Wound 06/03/21 1900 Left hip Pressure Injury    Wound - Properties Group Placement Date: 06/03/21  -JL Placement Time: 1900  -JL Side: Left  -JL Location: hip  -JL Primary Wound Type: Pressure inj  -JL    Wound Image  --  --  Images linked: 1  -JL    Retired Wound - Properties Group Date first assessed: 06/03/21  -JL Time first assessed: 1900  -JL Side: Left  -JL Location: hip  -JL Primary Wound Type: Pressure inj  -JL       Wound 06/03/21 1900 Right hip Pressure Injury    Wound - Properties Group Placement Date: 06/03/21  -JL Placement Time: 1900  -JL Side: Right  -JL Location: hip  -JL Primary Wound Type: Pressure inj  -JL Stage, Pressure Injury : deep tissue injury  -JL    Wound Image  --  Images linked: 1  -JL  --    Retired Wound - Properties Group Date first assessed: 06/03/21  -JL Time first assessed: 1900  -JL Side: Right  -JL Location: hip  -JL Primary Wound Type: Pressure inj  -JL       Wound 06/03/21 2011 Right anterior foot    Wound - Properties Group Placement Date: 06/03/21  -JL Placement Time: 2011  -JL Side: Right  -JL Orientation: anterior  -JL Location: foot  -JL    Retired Wound - Properties Group Date first assessed: 06/03/21  -JL Time first assessed: 2011  -JL Side: Right  -JL Location: foot  -JL       Wound 06/03/21 2012 Left anterior great toe    Wound - Properties Group Placement Date: 06/03/21  -JL Placement Time: 2012  -JL Side: Left  -JL Orientation: anterior  -JL Location: great toe  -JL, toes     Retired Wound -  Properties Group Date first assessed: 06/03/21  -JL Time first assessed: 2012  -JL Side: Left  -JL Location: great toe  -JL, toes        Wound 06/03/21 2017 Right hip    Wound - Properties Group Placement Date: 06/03/21  -JL Placement Time: 2017  -JL Side: Right  -JL Location: hip  -JL    Wound Image  Images linked: 1  -JL  --  --    Retired Wound - Properties Group Date first assessed: 06/03/21  -JL Time first assessed: 2017  -JL Side: Right  -JL Location: hip  -JL    Row Name 06/03/21 2012 06/03/21 2011 06/03/21 1901       Wound 04/01/21 1700 Right medial gluteal Degloving injury    Wound - Properties Group Placement Date: 04/01/21  -BJ Placement Time: 1700  -BJ Present on Hospital Admission: Y  -BJ Side: Right  -BJ Orientation: medial  -BJ Location: gluteal  -BJ Primary Wound Type: Degloving in  -BJ Stage, Pressure Injury : deep tissue injury  -BJ    Dressing Appearance  --  --  intact  -JL    Closure  --  --  KERRIE wound vac in place  -JL    Retired Wound - Properties Group Date first assessed: 04/01/21  -BJ Time first assessed: 1700  -BJ Present on Hospital Admission: Y  -BJ Side: Right  -BJ Location: gluteal  -BJ Primary Wound Type: Degloving in  -BJ       Wound 04/02/21 0643 sacral spine    Wound - Properties Group Placement Date: 04/02/21  -KK Placement Time: 0643  -KK Location: sacral spine  -KK Stage, Pressure Injury : unstageable  -KK    Dressing Appearance  --  --  intact  -JL    Closure  --  --  KERRIE wound vac in place  -JL    Retired Wound - Properties Group Date first assessed: 04/02/21  -KK Time first assessed: 0643  -KK Location: sacral spine  -KK       Wound 04/02/21 0739 sacral spine Incision    Wound - Properties Group Placement Date: 04/02/21  -AM Placement Time: 0739  -AM Location: sacral spine  -AM Primary Wound Type: Incision  -AM    Dressing Appearance  --  --  intact  -JL    Closure  --  --  KERRIE wound vac in place  -JL    Retired Wound - Properties Group Date first assessed: 04/02/21  -AM Time  first assessed: 0739 -AM Location: sacral spine  -AM Primary Wound Type: Incision  -AM       Wound 06/03/21 1900 Left hip Pressure Injury    Wound - Properties Group Placement Date: 06/03/21  -JL Placement Time: 1900  -JL Side: Left  -JL Location: hip  -JL Primary Wound Type: Pressure inj  -JL    Dressing Appearance  --  --  intact  -JL    Closure  --  --  Adhesive bandage  -JL    Base  --  --  yellow;slough;red;maroon/purple  -JL    Periwound  --  --  redness  -JL    Periwound Temperature  --  --  warm  -JL    Retired Wound - Properties Group Date first assessed: 06/03/21  -JL Time first assessed: 1900  -JL Side: Left  -JL Location: hip  -JL Primary Wound Type: Pressure inj  -JL       Wound 06/03/21 1900 Right hip Pressure Injury    Wound - Properties Group Placement Date: 06/03/21  -JL Placement Time: 1900  -JL Side: Right  -JL Location: hip  -JL Primary Wound Type: Pressure inj  -JL Stage, Pressure Injury : deep tissue injury  -JL    Dressing Appearance  --  --  intact  -JL    Closure  --  --  Adhesive bandage  -JL    Base  --  --  purple;red  -JL    Periwound  --  --  redness  -JL    Periwound Temperature  --  --  warm  -JL    Retired Wound - Properties Group Date first assessed: 06/03/21  -JL Time first assessed: 1900  -JL Side: Right  -JL Location: hip  -JL Primary Wound Type: Pressure inj  -JL       Wound 06/03/21 2011 Right anterior foot    Wound - Properties Group Placement Date: 06/03/21  -JL Placement Time: 2011  -JL Side: Right  -JL Orientation: anterior  -JL Location: foot  -JL    Wound Image  --  Images linked: 1  -JL  --    Dressing Appearance  --  --  open to air;no drainage  -JL    Base  --  --  black eschar  -JL    Periwound Temperature  --  --  warm  -JL    Periwound Skin Turgor  --  --  firm  -JL    Retired Wound - Properties Group Date first assessed: 06/03/21  -JL Time first assessed: 2011  -JL Side: Right  -JL Location: foot  -JL       Wound 06/03/21 2012 Left anterior great toe    Wound -  Properties Group Placement Date: 06/03/21  -JL Placement Time: 2012 -JL Side: Left  -JL Orientation: anterior  -JL Location: great toe  -JL, toes     Wound Image  Images linked: 1  -JL  --  --    Dressing Appearance  --  --  open to air;no drainage  -JL    Base  --  --  black eschar  -JL    Periwound  --  --  redness  -JL    Retired Wound - Properties Group Date first assessed: 06/03/21  -JL Time first assessed: 2012  -JL Side: Left  -JL Location: great toe  -JL, toes        Wound 06/03/21 2017 Right hip    Wound - Properties Group Placement Date: 06/03/21  -JL Placement Time: 2017  -JL Side: Right  -JL Location: hip  -JL    Dressing Appearance  --  --  no drainage;open to air  -JL    Base  --  --  red  -JL    Retired Wound - Properties Group Date first assessed: 06/03/21  -JL Time first assessed: 2017  -JL Side: Right  -JL Location: hip  -JL      User Key  (r) = Recorded By, (t) = Taken By, (c) = Cosigned By    Initials Name Provider Type    AM Frances Sanchez, RN Registered Nurse    Tricia Stewart RN Registered Nurse    Haily Grace RN Registered Nurse    Jessenia Mathias RN Registered Nurse          Procedures:              Results Review:     I reviewed the patient's new clinical results.      Lab Results (last 24 hours)     Procedure Component Value Units Date/Time    Blood Culture - Blood, Arm, Left [[id number]] Collected: 06/03/21 1411    Specimen: Blood from Arm, Left Updated: 06/04/21 1430     Blood Culture No growth at 24 hours    Blood Culture - Blood, Blood, Central Line [[id number]] Collected: 06/03/21 1411    Specimen: Blood, Central Line Updated: 06/04/21 1430     Blood Culture No growth at 24 hours    CBC & Differential [[id number]]  (Abnormal) Collected: 06/04/21 1041    Specimen: Blood Updated: 06/04/21 1146    Narrative:      The following orders were created for panel order CBC & Differential.  Procedure                               Abnormality         Status                      ---------                               -----------         ------                     Scan Slide[[id number]]                                                                  CBC Auto Differential[[id number]]        Abnormal            Final result                 Please view results for these tests on the individual orders.    CBC Auto Differential [[id number]]  (Abnormal) Collected: 06/04/21 1041    Specimen: Blood Updated: 06/04/21 1146     WBC 16.20 10*3/mm3      RBC 4.67 10*6/mm3      Hemoglobin 10.4 g/dL      Hematocrit 33.7 %      MCV 72.2 fL      MCH 22.3 pg      MCHC 30.9 g/dL      RDW 17.8 %      RDW-SD 45.5 fl      MPV 8.7 fL      Platelets 389 10*3/mm3      Neutrophil % 80.4 %      Lymphocyte % 11.2 %      Monocyte % 4.8 %      Eosinophil % 2.8 %      Basophil % 0.8 %      Neutrophils, Absolute 13.00 10*3/mm3      Lymphocytes, Absolute 1.80 10*3/mm3      Monocytes, Absolute 0.80 10*3/mm3      Eosinophils, Absolute 0.40 10*3/mm3      Basophils, Absolute 0.10 10*3/mm3      nRBC 0.0 /100 WBC     Urine Culture - Urine, Urine, Catheter [[id number]]  (Abnormal) Collected: 06/03/21 1416    Specimen: Urine, Catheter Updated: 06/04/21 1038     Urine Culture Yeast isolated     Comment: No further work up.       Basic Metabolic Panel [[id number]]  (Abnormal) Collected: 06/04/21 0359    Specimen: Blood Updated: 06/04/21 0556     Glucose 145 mg/dL      BUN 43 mg/dL      Creatinine 0.73 mg/dL      Sodium 148 mmol/L      Potassium 3.6 mmol/L      Chloride 112 mmol/L      CO2 26.0 mmol/L      Calcium 8.5 mg/dL      eGFR Non African Amer 104 mL/min/1.73      BUN/Creatinine Ratio 58.9     Anion Gap 10.0 mmol/L     Narrative:      GFR Normal >60  Chronic Kidney Disease <60  Kidney Failure <15      Phosphorus [[id number]]  (Normal) Collected: 06/04/21 0359    Specimen: Blood Updated: 06/04/21 0556     Phosphorus 2.5 mg/dL     Magnesium [[id number]]  (Normal) Collected: 06/04/21 0359    Specimen: Blood Updated: 06/04/21 0556      Magnesium 2.1 mg/dL         No results found for: HGBA1C            No results found for: LIPASE  No results found for: CHOL, CHLPL, TRIG, HDL, LDL, LDLDIRECT    No results found for: INTRAOP, PREDX, FINALDX, COMDX    Microbiology Results (last 10 days)     Procedure Component Value - Date/Time    Urine Culture - Urine, Urine, Catheter [[id number]]  (Abnormal) Collected: 06/03/21 1416    Lab Status: Preliminary result Specimen: Urine, Catheter Updated: 06/04/21 1038     Urine Culture Yeast isolated     Comment: No further work up.       Blood Culture - Blood, Arm, Left [[id number]] Collected: 06/03/21 1411    Lab Status: Preliminary result Specimen: Blood from Arm, Left Updated: 06/04/21 1430     Blood Culture No growth at 24 hours    Blood Culture - Blood, Blood, Central Line [[id number]] Collected: 06/03/21 1411    Lab Status: Preliminary result Specimen: Blood, Central Line Updated: 06/04/21 1430     Blood Culture No growth at 24 hours    Respiratory Panel PCR w/COVID-19(SARS-CoV-2) VICKY/TERESA/ALFIE/PAD/COR/MAD/DAMARIS In-House, NP Swab in UTM/VTM, 3-4 HR TAT - Swab, Nasopharynx [[id number]]  (Normal) Collected: 06/03/21 1404    Lab Status: Final result Specimen: Swab from Nasopharynx Updated: 06/03/21 1458     ADENOVIRUS, PCR Not Detected     Coronavirus 229E Not Detected     Coronavirus HKU1 Not Detected     Coronavirus NL63 Not Detected     Coronavirus OC43 Not Detected     COVID19 Not Detected     Human Metapneumovirus Not Detected     Human Rhinovirus/Enterovirus Not Detected     Influenza A PCR Not Detected     Influenza B PCR Not Detected     Parainfluenza Virus 1 Not Detected     Parainfluenza Virus 2 Not Detected     Parainfluenza Virus 3 Not Detected     Parainfluenza Virus 4 Not Detected     RSV, PCR Not Detected     Bordetella pertussis pcr Not Detected     Bordetella parapertussis PCR Not Detected     Chlamydophila pneumoniae PCR Not Detected     Mycoplasma pneumo by PCR Not Detected    Narrative:      In the  setting of a positive respiratory panel with a viral infection PLUS a negative procalcitonin without other underlying concern for bacterial infection, consider observing off antibiotics or discontinuation of antibiotics and continue supportive care. If the respiratory panel is positive for atypical bacterial infection (Bordetella pertussis, Chlamydophila pneumoniae, or Mycoplasma pneumoniae), consider antibiotic de-escalation to target atypical bacterial infection.          ECG/EMG Results (most recent)     None                  XR Chest 1 View    Result Date: 6/3/2021   1. Stable cardiomegaly which obscures the left lower chest. No acute chest findings.  Electronically Signed By-Analia Smyth MD On:6/3/2021 2:24 PM This report was finalized on [id number] by  Analia Smyth MD.          Xrays, labs reviewed personally by physician.    Medication Review:   I have reviewed the patient's current medication list      Scheduled Meds  amLODIPine, 10 mg, Oral, Q PM  aspirin-dipyridamole, 1 capsule, Oral, Q12H  fluconazole, 200 mg, Oral, Q24H  gabapentin, 600 mg, Oral, Q12H  Tunde, 1 packet, Oral, BID  pantoprazole, 40 mg, Oral, QAM  povidone-iodine, , Topical, Daily  sertraline, 50 mg, Oral, Daily  sodium chloride, 10 mL, Intravenous, Q12H        Meds Infusions       Meds PRN  •  acetaminophen  •  sodium chloride  •  sodium chloride        Assessment/Plan   Assessment/Plan     Active Hospital Problems    Diagnosis  POA   • Decubitus ulcer of left hip, stage 3 (CMS/HCC) [L89.223]  Yes   • Stage 4 skin ulcer of sacral region (CMS/HCC) [L98.429]  Yes   • Pressure injury of right hip, stage 1 [L89.211]  Yes   • Non-pressure chronic ulcer of other part of left foot with fat layer exposed (CMS/HCC) [L97.522]  Yes   • Non-pressure chronic ulcer of other part of right foot limited to breakdown of skin (CMS/HCC) [L97.511]  Yes   • Fever [R50.9]  Yes   • Multiple sclerosis (CMS/HCC) [G35]  Yes   • Benign essential hypertension  [I10]  Yes   • Hyperlipidemia [E78.5]  Yes   • Type 2 diabetes mellitus (CMS/HCC) [E11.9]  Yes   • GERD without esophagitis [K21.9]  Yes   • History of CVA (cerebrovascular accident) [Z86.73]  Not Applicable   • Neurogenic bladder [N31.9]  Yes   • Moderate malnutrition (CMS/HCC) [E44.0]  Yes   • Bowel incontinence [R15.9]  Yes   • Sacral pressure ulcer [L89.159]  Yes   • Pressure injury of sacral region, unstageable (CMS/[facility]) [L89.150]  Yes   • Dysphagia [R13.10]  Yes      Resolved Hospital Problems   No resolved problems to display.       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  -ID consult  -s/p surgical debridement on April 4, 2021  -last MRI of the pelvis showed osteomyelitis of the sacrum  -Cultures grew methicillin susceptible Staphylococcus aureus and Enterococcus faecalis.  The last organism was susceptible to vancomycin and ampicillin  -patient appearing debilitated with significant sequelae from multiple sclerosis likely to have poor healing, also with poor nutrition.    -Wound care, patient spouse taught dressing changes  -Nutrition consult, continue Tunde supplementation outpatient  -IV antibiotics with vanc/cefepime,  -MRI of pelvis   -Inflammatory markers  -Blood cultures    -Daily CBC     Leukocytosis-15. due to underlying infected ulcer,   -Continue antibiotics     Thrombocytosis-likely reactive due to underlying infection, normalizing  -Monitor daily     Anemia-  -Monitor daily  -iron, ferritin     Multiple sclerosis-with significant sequelae including multiple contractures chronic debility and signs of wasting  The patient is bedridden for 10 years secondary to multiple sclerosis  -Continue home medications  -PT/OT  -Patient's wife reports patient has issues with his meals, has copious thick secretions with eating and inhibits his ability to tolerate p.o.  - Mucinex  -GI was consulted for esophageal  dysmotility issues,s/p EGD with dilation on 4/2/2021 doing well post procedure, follow-up outpatient to discuss Botox in the future     Hypertension/GERD/depression-chronic in nature  -cont. home medication      CVA-patient previously reported, on antiplatelets  -Continue     Neurogenic bladder-due to MS  -Continue Matos cath      VTE Prophylaxis -   Mechanical Order History:      Ordered        06/03/21 1827  Place Sequential Compression Device  Once         06/03/21 1827  Maintain Sequential Compression Device  Continuous                 Pharmalogical Order History:     None            Code Status -   There are no questions and answers to display.       This patient has been examined wearing appropriate Personal Protective Equipment and discussed with rn. 06/04/21        Discharge Planning  nh        Electronically signed by Brock Alford MD, 06/04/21, 15:00 EDT.  [facility] Hospitalist Team

## 2021-06-04 NOTE — PLAN OF CARE
Goal Outcome Evaluation:  Plan of Care Reviewed With: patient, spouse     Outcome Summary: Patient is non verbal and has been bedbound for mostly 10 years. Family and patient do not want PT eval at this time,  Notify nursing. PPE; mask, gloves and glasses

## 2021-06-05 VITALS
RESPIRATION RATE: 18 BRPM | OXYGEN SATURATION: 96 % | HEART RATE: 71 BPM | SYSTOLIC BLOOD PRESSURE: 124 MMHG | DIASTOLIC BLOOD PRESSURE: 72 MMHG | BODY MASS INDEX: 18.67 KG/M2 | WEIGHT: 133.38 LBS | TEMPERATURE: 97.9 F | HEIGHT: 71 IN

## 2021-06-05 LAB
ALBUMIN SERPL-MCNC: 2.9 G/DL (ref 3.5–5.2)
ALBUMIN/GLOB SERPL: 0.8 G/DL
ALP SERPL-CCNC: 116 U/L (ref 39–117)
ALT SERPL W P-5'-P-CCNC: 66 U/L (ref 1–41)
ANION GAP SERPL CALCULATED.3IONS-SCNC: 7 MMOL/L (ref 5–15)
AST SERPL-CCNC: 27 U/L (ref 1–40)
BASOPHILS # BLD AUTO: 0.3 10*3/MM3 (ref 0–0.2)
BASOPHILS NFR BLD AUTO: 2.4 % (ref 0–1.5)
BILIRUB SERPL-MCNC: <0.2 MG/DL (ref 0–1.2)
BUN SERPL-MCNC: 31 MG/DL (ref 8–23)
BUN/CREAT SERPL: 55.4 (ref 7–25)
CALCIUM SPEC-SCNC: 8 MG/DL (ref 8.6–10.5)
CHLORIDE SERPL-SCNC: 107 MMOL/L (ref 98–107)
CO2 SERPL-SCNC: 27 MMOL/L (ref 22–29)
CREAT SERPL-MCNC: 0.56 MG/DL (ref 0.76–1.27)
DEPRECATED RDW RBC AUTO: 45.1 FL (ref 37–54)
EOSINOPHIL # BLD AUTO: 0.7 10*3/MM3 (ref 0–0.4)
EOSINOPHIL NFR BLD AUTO: 5.4 % (ref 0.3–6.2)
ERYTHROCYTE [DISTWIDTH] IN BLOOD BY AUTOMATED COUNT: 18 % (ref 12.3–15.4)
GFR SERPL CREATININE-BSD FRML MDRD: 141 ML/MIN/1.73
GLOBULIN UR ELPH-MCNC: 3.5 GM/DL
GLUCOSE SERPL-MCNC: 124 MG/DL (ref 65–99)
HCT VFR BLD AUTO: 32.2 % (ref 37.5–51)
HGB BLD-MCNC: 9.8 G/DL (ref 13–17.7)
LYMPHOCYTES # BLD AUTO: 2.3 10*3/MM3 (ref 0.7–3.1)
LYMPHOCYTES NFR BLD AUTO: 18.5 % (ref 19.6–45.3)
MAGNESIUM SERPL-MCNC: 1.9 MG/DL (ref 1.6–2.4)
MCH RBC QN AUTO: 21.6 PG (ref 26.6–33)
MCHC RBC AUTO-ENTMCNC: 30.4 G/DL (ref 31.5–35.7)
MCV RBC AUTO: 71 FL (ref 79–97)
MONOCYTES # BLD AUTO: 0.8 10*3/MM3 (ref 0.1–0.9)
MONOCYTES NFR BLD AUTO: 6.8 % (ref 5–12)
NEUTROPHILS NFR BLD AUTO: 66.9 % (ref 42.7–76)
NEUTROPHILS NFR BLD AUTO: 8.2 10*3/MM3 (ref 1.7–7)
NRBC BLD AUTO-RTO: 0.1 /100 WBC (ref 0–0.2)
PHOSPHATE SERPL-MCNC: 2.3 MG/DL (ref 2.5–4.5)
PLATELET # BLD AUTO: 404 10*3/MM3 (ref 140–450)
PMV BLD AUTO: 8.2 FL (ref 6–12)
POTASSIUM SERPL-SCNC: 3.5 MMOL/L (ref 3.5–5.2)
PROT SERPL-MCNC: 6.4 G/DL (ref 6–8.5)
RBC # BLD AUTO: 4.54 10*6/MM3 (ref 4.14–5.8)
SODIUM SERPL-SCNC: 141 MMOL/L (ref 136–145)
WBC # BLD AUTO: 12.3 10*3/MM3 (ref 3.4–10.8)

## 2021-06-05 PROCEDURE — 99239 HOSP IP/OBS DSCHRG MGMT >30: CPT | Performed by: INTERNAL MEDICINE

## 2021-06-05 PROCEDURE — 85025 COMPLETE CBC W/AUTO DIFF WBC: CPT | Performed by: INTERNAL MEDICINE

## 2021-06-05 PROCEDURE — 84100 ASSAY OF PHOSPHORUS: CPT | Performed by: INTERNAL MEDICINE

## 2021-06-05 PROCEDURE — 83735 ASSAY OF MAGNESIUM: CPT | Performed by: INTERNAL MEDICINE

## 2021-06-05 PROCEDURE — 80053 COMPREHEN METABOLIC PANEL: CPT | Performed by: INTERNAL MEDICINE

## 2021-06-05 RX ORDER — ARGININE/GLUTAMINE/CALCIUM BMB 7G-7G-1.5G
1 POWDER IN PACKET (EA) ORAL 2 TIMES DAILY
Qty: 60 EACH | Refills: 0 | Status: SHIPPED | OUTPATIENT
Start: 2021-06-05

## 2021-06-05 RX ORDER — FLUCONAZOLE 200 MG/1
200 TABLET ORAL
Qty: 8 TABLET | Refills: 0 | Status: SHIPPED | OUTPATIENT
Start: 2021-06-06 | End: 2021-06-14

## 2021-06-05 RX ADMIN — AMLODIPINE BESYLATE 10 MG: 5 TABLET ORAL at 16:37

## 2021-06-05 RX ADMIN — Medication 1 PACKET: at 08:07

## 2021-06-05 RX ADMIN — ASPRIN AND EXTENDED-RELEASE DIPYRIDAMOLE 1 CAPSULE: 25; 200 CAPSULE ORAL at 08:01

## 2021-06-05 RX ADMIN — GABAPENTIN 600 MG: 600 TABLET, FILM COATED ORAL at 08:01

## 2021-06-05 RX ADMIN — POVIDONE-IODINE: 10 SOLUTION TOPICAL at 08:03

## 2021-06-05 RX ADMIN — Medication 10 ML: at 08:01

## 2021-06-05 RX ADMIN — PANTOPRAZOLE SODIUM 40 MG: 40 TABLET, DELAYED RELEASE ORAL at 05:40

## 2021-06-05 RX ADMIN — SERTRALINE HYDROCHLORIDE 50 MG: 50 TABLET ORAL at 08:02

## 2021-06-05 RX ADMIN — FLUCONAZOLE 200 MG: 100 TABLET ORAL at 08:02

## 2021-06-05 NOTE — DISCHARGE SUMMARY
[facility]  DISCHARGE SUMMARY        Prepared For PCP:  Mickey Mullen APRN    Patient Name: Vicente Singh  : 1940  MRN: [id number]      Date of Admission:   6/3/2021    Date of Discharge:  2021    Length of stay:  LOS: 1 day     Hospital Course     Presenting Problem:   Fever, unspecified fever cause [R50.9]  Leukocytosis, unspecified type [D72.829]  Sepsis, due to unspecified organism, unspecified whether acute organ dysfunction present (CMS/[facility]) [A41.9]      Active Hospital Problems    Diagnosis  POA   • **Pressure injury of sacral region, unstageable (CMS/[facility]) [L89.150]  Yes   • Decubitus ulcer of left hip, stage 3 (CMS/[facility]) [L89.223]  Yes   • Stage 4 skin ulcer of sacral region (CMS/[facility]) [L98.429]  Yes   • Pressure injury of right hip, stage 1 [L89.211]  Yes   • Non-pressure chronic ulcer of other part of left foot with fat layer exposed (CMS/[facility]) [L97.522]  Yes   • Non-pressure chronic ulcer of other part of right foot limited to breakdown of skin (CMS/[facility]) [L97.511]  Yes   • Fever [R50.9]  Yes   • Multiple sclerosis (CMS/[facility]) [G35]  Yes   • Benign essential hypertension [I10]  Yes   • Hyperlipidemia [E78.5]  Yes   • Type 2 diabetes mellitus (CMS/[facility]) [E11.9]  Yes   • GERD without esophagitis [K21.9]  Yes   • History of CVA (cerebrovascular accident) [Z86.73]  Not Applicable   • Neurogenic bladder [N31.9]  Yes   • Moderate malnutrition (CMS/[facility]) [E44.0]  Yes   • Bowel incontinence [R15.9]  Yes   • Sacral pressure ulcer [L89.159]  Yes   • Dysphagia [R13.10]  Yes      Resolved Hospital Problems   No resolved problems to display.       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  Stage IV sacral pressure injury  Stage III left hip pressure injury  Stage I right hip pressure injury  Nonpressure injury with fat layer exposed to the left foot  Nonpressure injury with fat layer exposed to the right foot  -ID  consult  -s/p surgical debridement on April 4, 2021  -last MRI of the pelvis showed osteomyelitis of the sacrum  -Cultures grew methicillin susceptible Staphylococcus aureus and Enterococcus faecalis.  The last organism was susceptible to vancomycin and ampicillin  -patient appearing debilitated with significant sequelae from multiple sclerosis likely to have poor healing, also with poor nutrition.    -Wound care, patient spouse taught dressing changes  -Nutrition consult, continue Tunde supplementation outpatient  -IV antibiotics with vanc/cefepime, dc     Leukocytosis-15. due to underlying infected ulcer,   -Continue antibiotics     Thrombocytosis-likely reactive due to underlying infection, normalizing  -Monitor daily     Anemia-  -Monitor daily     Multiple sclerosis-with significant sequelae including multiple contractures chronic debility and signs of wasting  The patient is bedridden for 10 years secondary to multiple sclerosis  -Continue home medications  -PT/OT  -Patient's wife reports patient has issues with his meals, has copious thick secretions with eating and inhibits his ability to tolerate p.o.  - Mucinex  -GI was consulted for esophageal dysmotility issues,s/p EGD with dilation on 4/2/2021 doing well post procedure, follow-up outpatient to discuss Botox in the future     Hypertension/GERD/depression-chronic in nature  -cont. home medication      CVA-patient previously reported, on antiplatelets  -Continue     Neurogenic bladder-due to MS  -Continue Matos cath       Hospital Course:  Vicente Singh is a 80 y.o. white male with multimedical problems presents with fever.  Patient has a history of multiple sclerosis and is debilitated.  He was in the hospital in April for an infected decubitus ulcer.  He has a wound VAC in place at this time.  He also has a PICC line in place and is receiving IV Unasyn.  Wife states patient developed a fever 2 days ago up to 101.  Wife states patient has been sleepy  and lethargic.  He has not been talking as much as usual.  He denies any complaints of pain.  He has had no cough or congestion.  Wife denies any vomiting or diarrhea.  No alleviating or exacerbating factors.     In ER WBC 15, Na 149. Lactate 0.7, T 97.8, P:115, bp 123/77  Cxr:Stable cardiomegaly which obscures the left lower chest. No acute  chest findings.  RVP-NEG    6/5/21 doing better today, will dc home      Reasons For Change In Medications and Indications for New Medications:  Diflucan   Day of Discharge     Vital Signs:   Temp:  [97.3 °F (36.3 °C)-98.3 °F (36.8 °C)] 97.9 °F (36.6 °C)  Heart Rate:  [71-78] 71  Resp:  [16-20] 18  BP: (110-124)/(67-72) 124/72       Physical Exam  Vitals and nursing note reviewed.   Constitutional:       General: He is not in acute distress.     Appearance: Normal appearance. He is well-developed. He is ill-appearing. He is not toxic-appearing.   HENT:      Head: Normocephalic and atraumatic.      Nose: Nose normal. No congestion or rhinorrhea.      Mouth/Throat:      Mouth: Mucous membranes are moist.      Pharynx: No oropharyngeal exudate.   Eyes:      General: No scleral icterus.        Right eye: No discharge.         Left eye: No discharge.      Extraocular Movements: Extraocular movements intact.      Conjunctiva/sclera: Conjunctivae normal.      Pupils: Pupils are equal, round, and reactive to light.   Neck:      Thyroid: No thyromegaly.      Vascular: No carotid bruit or JVD.      Trachea: No tracheal deviation.   Cardiovascular:      Rate and Rhythm: Normal rate and regular rhythm.      Pulses: Normal pulses.      Heart sounds: Normal heart sounds. No murmur heard.   No friction rub. No gallop.    Pulmonary:      Effort: Pulmonary effort is normal. No respiratory distress.      Breath sounds: Normal breath sounds. No stridor.   Abdominal:      General: Bowel sounds are normal. There is no distension.      Palpations: Abdomen is soft. There is no mass.      Tenderness:  There is no abdominal tenderness.   Musculoskeletal:         General: No swelling or tenderness. Normal range of motion.      Cervical back: Normal range of motion and neck supple. No rigidity. No muscular tenderness.   Lymphadenopathy:      Cervical: No cervical adenopathy.   Skin:     General: Skin is warm and dry.      Coloration: Skin is not jaundiced or pale.      Findings: Bruising present.      Comments: Stage IV sacral pressure injury: Full-thickness injury to the sacrum there is some bone exposed however it appears to be granulating over well.  There is undermining to the wound approximate size of the wound is 4 x 4-1/2 cm with a depth of 0.8 cm there is undermining 3 much complete of 4 cm as well.  No odor is noted no erythema no warmth or induration is noted however there are multiple full-thickness device related pressure injury surrounding the wound.  Track pad to the wound VAC is directly over the wound and the tubing has caused numerous areas of pressure.     The wound was irrigated well with normal saline and dried Skin-Prep was applied to the periwound skin calcium alginate dressing was applied over the device related injury surrounding the wound.  The wound was filled with black foam drape was used to seal off and opening was cut dime sized in the foam and placed a larger area which extended over the undermining of black foam instilled this with drape as well another hole was cut the wound was then bridged and suction set at 125 continuous.     Stage III pressure injury wound wound [facility] there is actually several areas approximate size clustering the wounds together is 5 x 5 cm.  Depth 0.2 cm.  There is some slough to the base of the wound approximately 40-45%.  There is a moderate amount of serosanguineous exudate noted there are no overt symptoms of infection noted.     Stage I right trochanter pressure injury area of nonblanchable erythema is noted over the right trochanter it is not open there  is no induration or warmth noted from the site.     Bilateral feet have multiple abrasions to the toes into the left dorsal foot the areas are stable in appearance they are dry hard and clean no exudate noted no erythema induration warmth noted from the area      Neurological:      General: No focal deficit present.      Mental Status: He is alert and oriented to person, place, and time. Mental status is at baseline.      Cranial Nerves: No cranial nerve deficit.      Sensory: No sensory deficit.      Motor: Weakness present. No abnormal muscle tone.      Coordination: Coordination normal.         Pertinent  and/or Most Recent Results     Results from last 7 days   Lab Units 06/05/21  0553 06/04/21  1041 06/04/21  0359 06/03/21  1411   WBC 10*3/mm3 12.30* 16.20*  --  15.00*   HEMOGLOBIN g/dL 9.8* 10.4*  --  12.0*   HEMATOCRIT % 32.2* 33.7*  --  37.4*   PLATELETS 10*3/mm3 404 389  --  469*   SODIUM mmol/L 141  --  148* 149*   POTASSIUM mmol/L 3.5  --  3.6 3.6   CHLORIDE mmol/L 107  --  112* 110*   CO2 mmol/L 27.0  --  26.0 26.0   BUN mg/dL 31*  --  43* 49*   CREATININE mg/dL 0.56*  --  0.73* 0.79   GLUCOSE mg/dL 124*  --  145* 125*   CALCIUM mg/dL 8.0*  --  8.5* 9.2     Results from last 7 days   Lab Units 06/05/21  0553 06/03/21  1411   BILIRUBIN mg/dL <0.2 0.3   ALK PHOS U/L 116 91   ALT (SGPT) U/L 66* 105*   AST (SGOT) U/L 27 76*           Invalid input(s): TG, LDLCALC, LDLREALC  Results from last 7 days   Lab Units 06/03/21  1419   LACTATE mmol/L 0.7       Brief Urine Lab Results  (Last result in the past 365 days)      Color   Clarity   Blood   Leuk Est   Nitrite   Protein   CREAT   Urine HCG        06/03/21 1416 Yellow Clear Small (1+) Moderate (2+) Negative 30 mg/dL (1+)               Microbiology Results Abnormal     Procedure Component Value - Date/Time    Blood Culture - Blood, Arm, Left [[id number]] Collected: 06/03/21 1411    Lab Status: Preliminary result Specimen: Blood from Arm, Left Updated: 06/05/21  1430     Blood Culture No growth at 2 days    Blood Culture - Blood, Blood, Central Line [[id number]] Collected: 06/03/21 1411    Lab Status: Preliminary result Specimen: Blood, Central Line Updated: 06/05/21 1430     Blood Culture No growth at 2 days    Urine Culture - Urine, Urine, Catheter [[id number]]  (Abnormal) Collected: 06/03/21 1416    Lab Status: Final result Specimen: Urine, Catheter Updated: 06/04/21 2338     Urine Culture Yeast isolated     Comment: No further work up.       Respiratory Panel PCR w/COVID-19(SARS-CoV-2) VICKY/TERESA/ALFIE/PAD/COR/MAD/DAMARIS In-House, NP Swab in UTM/VTM, 3-4 HR TAT - Swab, Nasopharynx [[id number]]  (Normal) Collected: 06/03/21 1404    Lab Status: Final result Specimen: Swab from Nasopharynx Updated: 06/03/21 1458     ADENOVIRUS, PCR Not Detected     Coronavirus 229E Not Detected     Coronavirus HKU1 Not Detected     Coronavirus NL63 Not Detected     Coronavirus OC43 Not Detected     COVID19 Not Detected     Human Metapneumovirus Not Detected     Human Rhinovirus/Enterovirus Not Detected     Influenza A PCR Not Detected     Influenza B PCR Not Detected     Parainfluenza Virus 1 Not Detected     Parainfluenza Virus 2 Not Detected     Parainfluenza Virus 3 Not Detected     Parainfluenza Virus 4 Not Detected     RSV, PCR Not Detected     Bordetella pertussis pcr Not Detected     Bordetella parapertussis PCR Not Detected     Chlamydophila pneumoniae PCR Not Detected     Mycoplasma pneumo by PCR Not Detected    Narrative:      In the setting of a positive respiratory panel with a viral infection PLUS a negative procalcitonin without other underlying concern for bacterial infection, consider observing off antibiotics or discontinuation of antibiotics and continue supportive care. If the respiratory panel is positive for atypical bacterial infection (Bordetella pertussis, Chlamydophila pneumoniae, or Mycoplasma pneumoniae), consider antibiotic de-escalation to target atypical bacterial  infection.          XR Chest 1 View    Result Date: 6/3/2021  Impression:  1. Stable cardiomegaly which obscures the left lower chest. No acute chest findings.  Electronically Signed By-Analia Smyth MD On:6/3/2021 2:24 PM This report was finalized on [id number] by  Analia Smyth MD.                          Test Results Pending at Discharge  Pending Labs     Order Current Status    Blood Culture - Blood, Arm, Left Preliminary result    Blood Culture - Blood, Blood, Central Line Preliminary result            Procedures Performed           Consults:   Consults     Date and Time Order Name Status Description    6/3/2021  6:27 PM Inpatient Infectious Diseases Consult Completed     6/3/2021  3:10 PM Hospitalist (on-call MD unless specified) Completed            Assessment  Candiduria/UTI.  Patient has chronic indwelling Matos catheter secondary to urinary retention and neurogenic bladder  Matos catheter has replaced on June 4, 2021     Stage IV sacral decubitus ulceration.  Patient was treated with long course of IV antibiotics.  We initially started patient on Unasyn 8 weeks ago for 6 weeks secondary to infection with MSSA and Enterococcus faecalis but apparently his wound care doctor increase the duration.  The patient currently has a tunneled central line  Currently the wound does not appear to be grossly infected and has wound VAC on  Blood culture on this hospital admission including 1 set from the tunneled central line are negative     The patient presented with fever at home he has been afebrile here.  Blood cultures including 1 set from the PICC line are negative     Neurogenic bladder with chronic indwelling Matos catheter     The patient is bedridden for long time secondary to advanced multiple sclerosis     Plan  Continue fluconazole 200 mg p.o. daily for 10 days  Replace the current Matos catheter  Continue wound care, patient currently has wound VAC on  The case was discussed with the patient's wife  at the bedside  We need to consult IR to remove the tunneled central line prior to discharge if patient stays here until Monday otherwise the patient has to come back to the hospital to IR to have the central line removal.      Marck Hurley MD  06/05/21    Discharge Details        Discharge Medications      New Medications      Instructions Start Date   fluconazole 200 MG tablet  Commonly known as: DIFLUCAN   200 mg, Oral, Every 24 Hours Scheduled   Start Date: June 6, 2021     Tunde pack   1 packet, Oral, 2 Times Daily         Continue These Medications      Instructions Start Date   acetaminophen 500 MG tablet  Commonly known as: TYLENOL   500 mg, Oral, Every 6 Hours PRN      amLODIPine 10 MG tablet  Commonly known as: NORVASC   1 tablet, Oral, Every Evening      aspirin-dipyridamole  MG per 12 hr capsule  Commonly known as: AGGRENOX   1 capsule, Oral, Every 12 Hours      gabapentin 600 MG tablet  Commonly known as: NEURONTIN   1 tablet, Oral, 2 times daily      omeprazole 20 MG capsule  Commonly known as: priLOSEC   1 capsule, Oral, Daily      sertraline 50 MG tablet  Commonly known as: ZOLOFT   50 mg, Oral, Every Night at Bedtime         Stop These Medications    ampicillin-sulbactam (UNASYN) 3 gm IVPB in 100 mL NS (MBP)            No Known Allergies      Discharge Disposition:  Home or Self Care    Diet:  Hospital:  Diet Order   Procedures   • Diet Texture; Mechanical Ground         Discharge Activity:   Activity Instructions     Gradually Increase Activity Until at Pre-Hospitalization Level              CODE STATUS:    Code Status and Medical Interventions:   Ordered at: 06/04/21 1530     Code Status:    No CPR     Medical Interventions (Level of Support Prior to Arrest):    Full         Follow-up Appointments  No future appointments.    Additional Instructions for the Follow-ups that You Need to Schedule     Discharge Follow-up with PCP   As directed       Currently Documented PCP:    Mickey Mullen,  APRN    PCP Phone Number:    [phone]     Follow Up Details: 1 week                 Condition on Discharge:      Stable      This patient has been examined wearing appropriate Personal Protective Equipment and discussed with rn. 06/05/21      Electronically signed by Brock Alford MD, 06/05/21, 4:13 PM EDT.      Time: I spent  34  minutes on this discharge activity which included face-to-face encounter with the patient/reviewing the data in the system/coordination of the care with the nursing staff as well as consultants/documentation/entering orders.

## 2021-06-05 NOTE — PLAN OF CARE
Problem: Adult Inpatient Plan of Care  Goal: Plan of Care Review  Outcome: Ongoing, Progressing  Flowsheets (Taken 6/4/2021 1237 by Taylor Healy, PT)  Plan of Care Reviewed With:   patient   spouse  Goal: Patient-Specific Goal (Individualized)  Outcome: Ongoing, Progressing  Goal: Absence of Hospital-Acquired Illness or Injury  Outcome: Ongoing, Progressing  Intervention: Identify and Manage Fall Risk  Recent Flowsheet Documentation  Taken 6/5/2021 1324 by Amanda Chilel RN  Safety Promotion/Fall Prevention:   safety round/check completed   nonskid shoes/slippers when out of bed   fall prevention program maintained  Taken 6/5/2021 1106 by Amanda Chilel RN  Safety Promotion/Fall Prevention:   safety round/check completed   nonskid shoes/slippers when out of bed   fall prevention program maintained  Taken 6/5/2021 0950 by Amanda Chilel RN  Safety Promotion/Fall Prevention:   safety round/check completed   nonskid shoes/slippers when out of bed   fall prevention program maintained  Taken 6/5/2021 0710 by Amanda Chliel RN  Safety Promotion/Fall Prevention:   safety round/check completed   nonskid shoes/slippers when out of bed   fall prevention program maintained  Intervention: Prevent Skin Injury  Recent Flowsheet Documentation  Taken 6/5/2021 1106 by Amanda Chilel RN  Body Position: supine  Taken 6/5/2021 0710 by Amanda Chilel RN  Body Position: tilted, right  Skin Protection:   tubing/devices free from skin contact   skin-to-device areas padded   silicone foam dressing in place   incontinence pads utilized  Intervention: Prevent Infection  Recent Flowsheet Documentation  Taken 6/5/2021 1324 by Amanda Chilel RN  Infection Prevention:   rest/sleep promoted   single patient room provided  Taken 6/5/2021 1106 by Amanda Chilel RN  Infection Prevention:   rest/sleep promoted   single patient room provided  Taken 6/5/2021 0950 by Amanda Chilel RN  Infection Prevention:   single patient room provided    rest/sleep promoted  Taken 6/5/2021 0710 by Amanda Chilel RN  Infection Prevention:   rest/sleep promoted   single patient room provided  Goal: Optimal Comfort and Wellbeing  Outcome: Ongoing, Progressing  Intervention: Provide Person-Centered Care  Recent Flowsheet Documentation  Taken 6/5/2021 0710 by Amanda Chilel RN  Trust Relationship/Rapport:   care explained   thoughts/feelings acknowledged  Goal: Readiness for Transition of Care  Outcome: Ongoing, Progressing     Problem: Fall Injury Risk  Goal: Absence of Fall and Fall-Related Injury  Outcome: Ongoing, Progressing  Intervention: Identify and Manage Contributors to Fall Injury Risk  Recent Flowsheet Documentation  Taken 6/5/2021 1324 by Amanda Chilel RN  Medication Review/Management: medications reviewed  Taken 6/5/2021 1106 by Amanda Chilel RN  Medication Review/Management: medications reviewed  Taken 6/5/2021 0950 by Amanda Chilel RN  Medication Review/Management: medications reviewed  Taken 6/5/2021 0710 by Amanda Chilel RN  Medication Review/Management: medications reviewed  Intervention: Promote Injury-Free Environment  Recent Flowsheet Documentation  Taken 6/5/2021 1324 by Amanda Chilel RN  Safety Promotion/Fall Prevention:   safety round/check completed   nonskid shoes/slippers when out of bed   fall prevention program maintained  Taken 6/5/2021 1106 by Amanda Chilel RN  Safety Promotion/Fall Prevention:   safety round/check completed   nonskid shoes/slippers when out of bed   fall prevention program maintained  Taken 6/5/2021 0950 by Amanda Chilel, ARIELA  Safety Promotion/Fall Prevention:   safety round/check completed   nonskid shoes/slippers when out of bed   fall prevention program maintained  Taken 6/5/2021 0710 by Amanda Chilel, ARIELA  Safety Promotion/Fall Prevention:   safety round/check completed   nonskid shoes/slippers when out of bed   fall prevention program maintained     Problem: Skin Injury Risk Increased  Goal: Skin Health and  Integrity  Outcome: Ongoing, Progressing  Intervention: Optimize Skin Protection  Recent Flowsheet Documentation  Taken 6/5/2021 0710 by Amanda Chilel, ARIELA  Pressure Reduction Techniques:   frequent weight shift encouraged   weight shift assistance provided  Head of Bed (HOB): HOB at 20-30 degrees  Pressure Reduction Devices: pressure-redistributing mattress utilized  Skin Protection:   tubing/devices free from skin contact   skin-to-device areas padded   silicone foam dressing in place   incontinence pads utilized     Problem: Malnutrition  Goal: Improved Nutritional Intake  Outcome: Ongoing, Progressing   Goal Outcome Evaluation:         Pt rested throughout the shift. Pt had no c/o pain. Pt wife at bedside most of shift. Spoke with MD and possible D/C today. Continuing Q2 turn. Will continue to observe.

## 2021-06-07 NOTE — CASE MANAGEMENT/SOCIAL WORK
Discharge Planning Assessment   Paul     Patient Name: Vicente Singh  MRN: [id number]  Today's Date: 6/7/2021    Admit Date: 6/3/2021          Plan    Final Discharge Disposition Code  06 - home with home health care    Final Note  home with [facility]       Carol naegele rn  Case management  Office number [phone]  Cell phone [phone]

## 2021-06-08 LAB
BACTERIA SPEC AEROBE CULT: NORMAL
BACTERIA SPEC AEROBE CULT: NORMAL

## 2021-06-10 ENCOUNTER — OFFICE VISIT (OUTPATIENT)
Dept: WOUND CARE | Facility: HOSPITAL | Age: 81
End: 2021-06-10

## 2021-06-10 PROCEDURE — G0463 HOSPITAL OUTPT CLINIC VISIT: HCPCS

## 2021-06-24 ENCOUNTER — APPOINTMENT (OUTPATIENT)
Dept: WOUND CARE | Facility: HOSPITAL | Age: 81
End: 2021-06-24

## 2024-01-08 NOTE — PROGRESS NOTES
Infectious Diseases Progress Note      LOS: 1 day   Patient Care Team:  Mickey Mullen APRN as PCP - General (Family Medicine)  Jessenia Pederson, RN as Registered Nurse    Chief Complaint: Fever prior to admit    Subjective     The patient had no fever during this hospital admission.  The patient remained hemodynamically stable.  The patient denied having any new complaints.  Matos catheter was replaced by the nursing staff yesterday    Review of Systems:   Review of Systems   Unable to perform ROS: Other (Immobility state)        Objective     Vital Signs  Temp:  [97.3 °F (36.3 °C)-98.3 °F (36.8 °C)] 97.9 °F (36.6 °C)  Heart Rate:  [71-78] 71  Resp:  [16-20] 18  BP: (102-124)/(62-72) 124/72    Physical Exam:  Physical Exam  Vitals and nursing note reviewed.   Constitutional:       Appearance: He is well-developed.   HENT:      Head: Normocephalic and atraumatic.   Eyes:      Pupils: Pupils are equal, round, and reactive to light.   Cardiovascular:      Rate and Rhythm: Normal rate and regular rhythm.      Heart sounds: Normal heart sounds.   Pulmonary:      Effort: Pulmonary effort is normal. No respiratory distress.      Breath sounds: Normal breath sounds. No wheezing or rales.   Abdominal:      General: Bowel sounds are normal. There is no distension.      Palpations: Abdomen is soft. There is no mass.      Tenderness: There is no abdominal tenderness. There is no guarding or rebound.   Musculoskeletal:         General: Deformity present.      Comments: Immobility state patient is contracted in the upper and lower extremities   Skin:     General: Skin is warm.      Findings: No erythema or rash.   Neurological:      Mental Status: He is alert. He is disoriented.          Results Review:    I have reviewed all clinical data, test, lab, and imaging results.     Radiology  No Radiology Exams Resulted Within Past 24 Hours    Cardiology    Laboratory    Results from last 7 days   Lab Units 06/05/21  0572  06/04/21  1041 06/03/21  1411   WBC 10*3/mm3 12.30* 16.20* 15.00*   HEMOGLOBIN g/dL 9.8* 10.4* 12.0*   HEMATOCRIT % 32.2* 33.7* 37.4*   PLATELETS 10*3/mm3 404 389 469*     Results from last 7 days   Lab Units 06/05/21  0553 06/04/21  0359 06/03/21  1411   SODIUM mmol/L 141 148* 149*   POTASSIUM mmol/L 3.5 3.6 3.6   CHLORIDE mmol/L 107 112* 110*   CO2 mmol/L 27.0 26.0 26.0   BUN mg/dL 31* 43* 49*   CREATININE mg/dL 0.56* 0.73* 0.79   GLUCOSE mg/dL 124* 145* 125*   ALBUMIN g/dL 2.90*  --  3.30*   BILIRUBIN mg/dL <0.2  --  0.3   ALK PHOS U/L 116  --  91   AST (SGOT) U/L 27  --  76*   ALT (SGPT) U/L 66*  --  105*   CALCIUM mg/dL 8.0* 8.5* 9.2                 Microbiology   Microbiology Results (last 10 days)     Procedure Component Value - Date/Time    Urine Culture - Urine, Urine, Catheter [[id number]]  (Abnormal) Collected: 06/03/21 1416    Lab Status: Final result Specimen: Urine, Catheter Updated: 06/04/21 2338     Urine Culture Yeast isolated     Comment: No further work up.       Blood Culture - Blood, Arm, Left [[id number]] Collected: 06/03/21 1411    Lab Status: Preliminary result Specimen: Blood from Arm, Left Updated: 06/05/21 1430     Blood Culture No growth at 2 days    Blood Culture - Blood, Blood, Central Line [[id number]] Collected: 06/03/21 1411    Lab Status: Preliminary result Specimen: Blood, Central Line Updated: 06/05/21 1430     Blood Culture No growth at 2 days    Respiratory Panel PCR w/COVID-19(SARS-CoV-2) [facility] In-House, NP Swab in UTM/VTM, 3-4 HR TAT - Swab, Nasopharynx [[id number]]  (Normal) Collected: 06/03/21 1404    Lab Status: Final result Specimen: Swab from Nasopharynx Updated: 06/03/21 3567     ADENOVIRUS, PCR Not Detected     Coronavirus 229E Not Detected     Coronavirus HKU1 Not Detected     Coronavirus NL63 Not Detected     Coronavirus OC43 Not Detected     COVID19 Not Detected     Human Metapneumovirus Not Detected     Human Rhinovirus/Enterovirus Not Detected      Influenza A PCR Not Detected     Influenza B PCR Not Detected     Parainfluenza Virus 1 Not Detected     Parainfluenza Virus 2 Not Detected     Parainfluenza Virus 3 Not Detected     Parainfluenza Virus 4 Not Detected     RSV, PCR Not Detected     Bordetella pertussis pcr Not Detected     Bordetella parapertussis PCR Not Detected     Chlamydophila pneumoniae PCR Not Detected     Mycoplasma pneumo by PCR Not Detected    Narrative:      In the setting of a positive respiratory panel with a viral infection PLUS a negative procalcitonin without other underlying concern for bacterial infection, consider observing off antibiotics or discontinuation of antibiotics and continue supportive care. If the respiratory panel is positive for atypical bacterial infection (Bordetella pertussis, Chlamydophila pneumoniae, or Mycoplasma pneumoniae), consider antibiotic de-escalation to target atypical bacterial infection.          Medication Review:       Schedule Meds  amLODIPine, 10 mg, Oral, Q PM  aspirin-dipyridamole, 1 capsule, Oral, Q12H  fluconazole, 200 mg, Oral, Q24H  gabapentin, 600 mg, Oral, Q12H  Tunde, 1 packet, Oral, BID  pantoprazole, 40 mg, Oral, QAM  povidone-iodine, , Topical, Daily  sertraline, 50 mg, Oral, Daily  sodium chloride, 10 mL, Intravenous, Q12H        Infusion Meds       PRN Meds  •  acetaminophen  •  sodium chloride  •  sodium chloride        Assessment/Plan       Antimicrobial Therapy   1.  Diflucan      day  2.      Day  3.      Day  4.      Day  5.      Day       Assessment     Candiduria/UTI.  Patient has chronic indwelling Matos catheter secondary to urinary retention and neurogenic bladder  Matos catheter has replaced on June 4, 2021     Stage IV sacral decubitus ulceration.  Patient was treated with long course of IV antibiotics.  We initially started patient on Unasyn 8 weeks ago for 6 weeks secondary to infection with MSSA and Enterococcus faecalis but apparently his wound care doctor increase  the duration.  The patient currently has a tunneled central line  Currently the wound does not appear to be grossly infected and has wound VAC on  Blood culture on this hospital admission including 1 set from the tunneled central line are negative     The patient presented with fever at home he has been afebrile here.  Blood cultures including 1 set from the PICC line are negative     Neurogenic bladder with chronic indwelling Matos catheter     The patient is bedridden for long time secondary to advanced multiple sclerosis           Plan     Continue fluconazole 200 mg p.o. daily for 10 days  Replace the current Matos catheter  Continue wound care, patient currently has wound VAC on  The case was discussed with the patient's wife at the bedside  We need to consult IR to remove the tunneled central line prior to discharge if patient stays here until Monday otherwise the patient has to come back to the hospital to IR to have the central line removal.         Marck Hurley MD  06/05/21  15:08 EDT      Note is dictated utilizing voice recognition software/Dragon   Abnormal WBC: < 4,000 OR > 12,000

## (undated) DEVICE — SOL IRRIG NACL 1000ML

## (undated) DEVICE — DEV INFL BALN BIG60 W/GAUGE 60ML

## (undated) DEVICE — SPNG LAP PREWSH SFTPK 18X18IN STRL PK/5

## (undated) DEVICE — SOL IRRIG H2O 1000ML STRL

## (undated) DEVICE — BITEBLOCK ENDO W/STRAP 60F A/ LF DISP

## (undated) DEVICE — KT SURG TURNOVER 050

## (undated) DEVICE — STANDARD HYPODERMIC NEEDLE,POLYPROPYLENE HUB: Brand: MONOJECT

## (undated) DEVICE — GLV SURG SENSICARE SLT PF LF 8 STRL

## (undated) DEVICE — WET SKIN PREP TRAY: Brand: MEDLINE INDUSTRIES, INC.

## (undated) DEVICE — GOWN,REINFRCE,POLY,SIRUS,BREATH SLV,XXLG: Brand: MEDLINE

## (undated) DEVICE — ESOPHAGEAL/PYLORIC/COLONIC/BILIARY WIREGUIDED BALLOON DILATATION CATHETER: Brand: CRE™ PRO

## (undated) DEVICE — PENCL EVAC ULTRAVAC SMOKE W/BLD

## (undated) DEVICE — PAD,ABDOMINAL,5"X9",STERILE,LF,1/PK: Brand: MEDLINE INDUSTRIES, INC.

## (undated) DEVICE — GAUZE,SPONGE,FLUFF,6"X6.75",STRL,5/TRAY: Brand: MEDLINE

## (undated) DEVICE — BANDAGE,GAUZE,BULKEE II,4.5"X4.1YD,STRL: Brand: MEDLINE

## (undated) DEVICE — PK MINOR LAPAROTOMY 50

## (undated) DEVICE — PK ENDO GI 50

## (undated) DEVICE — UNDERGLV SURG BIOGEL/PI PF SYNTH SURG SZ8.5 BLU 50/BX

## (undated) DEVICE — PAPR PRNT PK SONY W RIBN UPC55